# Patient Record
Sex: FEMALE | Race: WHITE | NOT HISPANIC OR LATINO | Employment: FULL TIME | URBAN - METROPOLITAN AREA
[De-identification: names, ages, dates, MRNs, and addresses within clinical notes are randomized per-mention and may not be internally consistent; named-entity substitution may affect disease eponyms.]

---

## 2017-03-04 ENCOUNTER — HOSPITAL ENCOUNTER (OUTPATIENT)
Dept: RADIOLOGY | Facility: MEDICAL CENTER | Age: 27
End: 2017-03-04
Attending: FAMILY MEDICINE
Payer: COMMERCIAL

## 2017-03-04 DIAGNOSIS — R22.2 LOCALIZED SWELLING, MASS AND LUMP, TRUNK: ICD-10-CM

## 2017-03-04 PROCEDURE — 76705 ECHO EXAM OF ABDOMEN: CPT

## 2017-06-01 PROBLEM — D23.5 OTHER BENIGN NEOPLASM OF SKIN OF TRUNK: Status: ACTIVE | Noted: 2017-06-01

## 2017-06-01 PROBLEM — D22.5 MELANOCYTIC NEVI OF TRUNK: Status: ACTIVE | Noted: 2017-06-01

## 2017-06-15 PROBLEM — D49.2 NEOPLASM OF UNSPECIFIED BEHAVIOR OF BONE, SOFT TISSUE, AND SKIN: Status: RESOLVED | Noted: 2017-06-01 | Resolved: 2017-06-15

## 2017-09-18 ENCOUNTER — HOSPITAL ENCOUNTER (OUTPATIENT)
Dept: LAB | Facility: MEDICAL CENTER | Age: 27
End: 2017-09-18
Attending: OBSTETRICS & GYNECOLOGY
Payer: COMMERCIAL

## 2017-09-18 PROCEDURE — 87591 N.GONORRHOEAE DNA AMP PROB: CPT

## 2017-09-18 PROCEDURE — 87491 CHLMYD TRACH DNA AMP PROBE: CPT

## 2017-09-18 PROCEDURE — 88175 CYTOPATH C/V AUTO FLUID REDO: CPT

## 2017-09-19 LAB
C TRACH DNA GENITAL QL NAA+PROBE: NEGATIVE
CYTOLOGY REG CYTOL: NORMAL
N GONORRHOEA DNA GENITAL QL NAA+PROBE: NEGATIVE
SPECIMEN SOURCE: NORMAL

## 2017-10-13 ENCOUNTER — HOSPITAL ENCOUNTER (OUTPATIENT)
Dept: LAB | Facility: MEDICAL CENTER | Age: 27
End: 2017-10-13
Attending: OBSTETRICS & GYNECOLOGY
Payer: COMMERCIAL

## 2017-10-13 LAB
ABO GROUP BLD: NORMAL
BASOPHILS # BLD AUTO: 0.6 % (ref 0–1.8)
BASOPHILS # BLD: 0.06 K/UL (ref 0–0.12)
BLD GP AB SCN SERPL QL: NORMAL
EOSINOPHIL # BLD AUTO: 0.28 K/UL (ref 0–0.51)
EOSINOPHIL NFR BLD: 2.8 % (ref 0–6.9)
ERYTHROCYTE [DISTWIDTH] IN BLOOD BY AUTOMATED COUNT: 54.3 FL (ref 35.9–50)
HBV SURFACE AG SER QL: NEGATIVE
HCT VFR BLD AUTO: 39 % (ref 37–47)
HCV AB SER QL: NEGATIVE
HGB BLD-MCNC: 13.2 G/DL (ref 12–16)
HIV 1+2 AB+HIV1 P24 AG SERPL QL IA: NON REACTIVE
IMM GRANULOCYTES # BLD AUTO: 0.07 K/UL (ref 0–0.11)
IMM GRANULOCYTES NFR BLD AUTO: 0.7 % (ref 0–0.9)
LYMPHOCYTES # BLD AUTO: 2.48 K/UL (ref 1–4.8)
LYMPHOCYTES NFR BLD: 24.7 % (ref 22–41)
MCH RBC QN AUTO: 29.6 PG (ref 27–33)
MCHC RBC AUTO-ENTMCNC: 33.8 G/DL (ref 33.6–35)
MCV RBC AUTO: 87.4 FL (ref 81.4–97.8)
MONOCYTES # BLD AUTO: 0.62 K/UL (ref 0–0.85)
MONOCYTES NFR BLD AUTO: 6.2 % (ref 0–13.4)
NEUTROPHILS # BLD AUTO: 6.55 K/UL (ref 2–7.15)
NEUTROPHILS NFR BLD: 65 % (ref 44–72)
NRBC # BLD AUTO: 0 K/UL
NRBC BLD AUTO-RTO: 0 /100 WBC
PLATELET # BLD AUTO: 249 K/UL (ref 164–446)
PMV BLD AUTO: 9.9 FL (ref 9–12.9)
RBC # BLD AUTO: 4.46 M/UL (ref 4.2–5.4)
RH BLD: NORMAL
RUBV AB SER QL: 182.5 IU/ML
TREPONEMA PALLIDUM IGG+IGM AB [PRESENCE] IN SERUM OR PLASMA BY IMMUNOASSAY: NON REACTIVE
WBC # BLD AUTO: 10.1 K/UL (ref 4.8–10.8)

## 2017-10-13 PROCEDURE — 85025 COMPLETE CBC W/AUTO DIFF WBC: CPT

## 2017-10-13 PROCEDURE — 86803 HEPATITIS C AB TEST: CPT

## 2017-10-13 PROCEDURE — 86850 RBC ANTIBODY SCREEN: CPT

## 2017-10-13 PROCEDURE — 87389 HIV-1 AG W/HIV-1&-2 AB AG IA: CPT

## 2017-10-13 PROCEDURE — 86780 TREPONEMA PALLIDUM: CPT

## 2017-10-13 PROCEDURE — 86901 BLOOD TYPING SEROLOGIC RH(D): CPT

## 2017-10-13 PROCEDURE — 86900 BLOOD TYPING SEROLOGIC ABO: CPT

## 2017-10-13 PROCEDURE — 87340 HEPATITIS B SURFACE AG IA: CPT

## 2017-10-13 PROCEDURE — 87086 URINE CULTURE/COLONY COUNT: CPT

## 2017-10-13 PROCEDURE — 86762 RUBELLA ANTIBODY: CPT

## 2017-10-13 PROCEDURE — 36415 COLL VENOUS BLD VENIPUNCTURE: CPT

## 2017-10-15 LAB
BACTERIA UR CULT: NORMAL
SIGNIFICANT IND 70042: NORMAL
SOURCE SOURCE: NORMAL

## 2017-12-27 ENCOUNTER — HOSPITAL ENCOUNTER (OUTPATIENT)
Dept: LAB | Facility: MEDICAL CENTER | Age: 27
End: 2017-12-27
Attending: OBSTETRICS & GYNECOLOGY
Payer: COMMERCIAL

## 2017-12-27 LAB
GLUCOSE 1H P 50 G GLC PO SERPL-MCNC: 99 MG/DL (ref 70–139)
HCT VFR BLD AUTO: 36.4 % (ref 37–47)
HGB BLD-MCNC: 12.3 G/DL (ref 12–16)
PLATELET # BLD AUTO: 201 K/UL (ref 164–446)
TREPONEMA PALLIDUM IGG+IGM AB [PRESENCE] IN SERUM OR PLASMA BY IMMUNOASSAY: NON REACTIVE

## 2017-12-27 PROCEDURE — 82950 GLUCOSE TEST: CPT

## 2017-12-27 PROCEDURE — 85018 HEMOGLOBIN: CPT

## 2017-12-27 PROCEDURE — 85014 HEMATOCRIT: CPT

## 2017-12-27 PROCEDURE — 36415 COLL VENOUS BLD VENIPUNCTURE: CPT

## 2017-12-27 PROCEDURE — 86780 TREPONEMA PALLIDUM: CPT

## 2017-12-27 PROCEDURE — 85049 AUTOMATED PLATELET COUNT: CPT

## 2018-02-28 ENCOUNTER — HOSPITAL ENCOUNTER (OUTPATIENT)
Dept: LAB | Facility: MEDICAL CENTER | Age: 28
End: 2018-02-28
Attending: OBSTETRICS & GYNECOLOGY
Payer: COMMERCIAL

## 2018-02-28 PROCEDURE — 87653 STREP B DNA AMP PROBE: CPT

## 2018-03-02 LAB — GP B STREP DNA SPEC QL NAA+PROBE: NEGATIVE

## 2018-03-14 ENCOUNTER — HOSPITAL ENCOUNTER (OUTPATIENT)
Facility: MEDICAL CENTER | Age: 28
End: 2018-03-14
Attending: OBSTETRICS & GYNECOLOGY | Admitting: OBSTETRICS & GYNECOLOGY
Payer: COMMERCIAL

## 2018-03-14 VITALS — TEMPERATURE: 97.9 F | SYSTOLIC BLOOD PRESSURE: 122 MMHG | DIASTOLIC BLOOD PRESSURE: 65 MMHG

## 2018-03-14 PROCEDURE — 59025 FETAL NON-STRESS TEST: CPT | Performed by: OBSTETRICS & GYNECOLOGY

## 2018-03-15 NOTE — PROGRESS NOTES
" EDC  36w4d. Pt presents to L&D with complaints of decreased FM. PT taken to LDA 2 for assessment.      TOCO and EFM applied, VSS. Pt states that throughout the day today she has felt \"fluttering\" but has not been able to get her 10 kick counts in 2 hours. Pt denies LOF or VB. Will update Dr. Green on pt status.     Dr. Green updated on pt status, orders to SVE and discharge home.      RN at bedside, SVE -/juvenal. Pt educated on anterior placenta and FM, pt encouraged to seek care if she is worried about FM. Labor precautions given and all questions answered.      PT discharged home in stable condition.     "

## 2018-03-28 ENCOUNTER — HOSPITAL ENCOUNTER (INPATIENT)
Facility: MEDICAL CENTER | Age: 28
LOS: 4 days | End: 2018-04-01
Attending: OBSTETRICS & GYNECOLOGY | Admitting: OBSTETRICS & GYNECOLOGY
Payer: COMMERCIAL

## 2018-03-28 DIAGNOSIS — G89.18 POST-OPERATIVE PAIN: ICD-10-CM

## 2018-03-28 LAB
BASOPHILS # BLD AUTO: 0.7 % (ref 0–1.8)
BASOPHILS # BLD: 0.07 K/UL (ref 0–0.12)
EOSINOPHIL # BLD AUTO: 0.25 K/UL (ref 0–0.51)
EOSINOPHIL NFR BLD: 2.5 % (ref 0–6.9)
ERYTHROCYTE [DISTWIDTH] IN BLOOD BY AUTOMATED COUNT: 43.6 FL (ref 35.9–50)
HCT VFR BLD AUTO: 41.4 % (ref 37–47)
HGB BLD-MCNC: 14.8 G/DL (ref 12–16)
HOLDING TUBE BB 8507: NORMAL
IMM GRANULOCYTES # BLD AUTO: 0.08 K/UL (ref 0–0.11)
IMM GRANULOCYTES NFR BLD AUTO: 0.8 % (ref 0–0.9)
LYMPHOCYTES # BLD AUTO: 2.38 K/UL (ref 1–4.8)
LYMPHOCYTES NFR BLD: 23.8 % (ref 22–41)
MCH RBC QN AUTO: 34.3 PG (ref 27–33)
MCHC RBC AUTO-ENTMCNC: 35.7 G/DL (ref 33.6–35)
MCV RBC AUTO: 96.1 FL (ref 81.4–97.8)
MONOCYTES # BLD AUTO: 0.55 K/UL (ref 0–0.85)
MONOCYTES NFR BLD AUTO: 5.5 % (ref 0–13.4)
NEUTROPHILS # BLD AUTO: 6.69 K/UL (ref 2–7.15)
NEUTROPHILS NFR BLD: 66.7 % (ref 44–72)
NRBC # BLD AUTO: 0 K/UL
NRBC BLD-RTO: 0 /100 WBC
PLATELET # BLD AUTO: 159 K/UL (ref 164–446)
PMV BLD AUTO: 10.3 FL (ref 9–12.9)
RBC # BLD AUTO: 4.31 M/UL (ref 4.2–5.4)
WBC # BLD AUTO: 10 K/UL (ref 4.8–10.8)

## 2018-03-28 PROCEDURE — 700101 HCHG RX REV CODE 250

## 2018-03-28 PROCEDURE — 700111 HCHG RX REV CODE 636 W/ 250 OVERRIDE (IP)

## 2018-03-28 PROCEDURE — 36415 COLL VENOUS BLD VENIPUNCTURE: CPT

## 2018-03-28 PROCEDURE — 700111 HCHG RX REV CODE 636 W/ 250 OVERRIDE (IP): Performed by: ANESTHESIOLOGY

## 2018-03-28 PROCEDURE — 700105 HCHG RX REV CODE 258: Performed by: OBSTETRICS & GYNECOLOGY

## 2018-03-28 PROCEDURE — 305181 HCHG SEPRA FILM

## 2018-03-28 PROCEDURE — 59514 CESAREAN DELIVERY ONLY: CPT

## 2018-03-28 PROCEDURE — 700105 HCHG RX REV CODE 258: Performed by: ANESTHESIOLOGY

## 2018-03-28 PROCEDURE — 770002 HCHG ROOM/CARE - OB PRIVATE (112)

## 2018-03-28 PROCEDURE — 306288 HCHG RETRACTOR C SECTION LG

## 2018-03-28 PROCEDURE — 304964 HCHG RECOVERY ROOM TIME 1HR: Performed by: OBSTETRICS & GYNECOLOGY

## 2018-03-28 PROCEDURE — 85025 COMPLETE CBC W/AUTO DIFF WBC: CPT

## 2018-03-28 PROCEDURE — 700102 HCHG RX REV CODE 250 W/ 637 OVERRIDE(OP): Performed by: ANESTHESIOLOGY

## 2018-03-28 PROCEDURE — 305385 HCHG SURGICAL SERVICES 1/4 HOUR: Performed by: OBSTETRICS & GYNECOLOGY

## 2018-03-28 PROCEDURE — 306828 HCHG ANES-TIME GENERAL: Performed by: OBSTETRICS & GYNECOLOGY

## 2018-03-28 PROCEDURE — 304966 HCHG RECOVERY SVSC TIME ADDL 1/2 HR: Performed by: OBSTETRICS & GYNECOLOGY

## 2018-03-28 RX ORDER — METOCLOPRAMIDE HYDROCHLORIDE 5 MG/ML
10 INJECTION INTRAMUSCULAR; INTRAVENOUS ONCE
Status: COMPLETED | OUTPATIENT
Start: 2018-03-28 | End: 2018-03-28

## 2018-03-28 RX ORDER — CLINDAMYCIN PHOSPHATE 900 MG/50ML
900 INJECTION, SOLUTION INTRAVENOUS ONCE
Status: DISCONTINUED | OUTPATIENT
Start: 2018-03-28 | End: 2018-03-28 | Stop reason: HOSPADM

## 2018-03-28 RX ORDER — ASPIRIN 81 MG/1
81 TABLET, CHEWABLE ORAL DAILY
COMMUNITY
End: 2019-06-14

## 2018-03-28 RX ORDER — MISOPROSTOL 200 UG/1
800 TABLET ORAL
Status: DISCONTINUED | OUTPATIENT
Start: 2018-03-28 | End: 2018-03-28 | Stop reason: HOSPADM

## 2018-03-28 RX ORDER — SODIUM CHLORIDE, SODIUM LACTATE, POTASSIUM CHLORIDE, CALCIUM CHLORIDE 600; 310; 30; 20 MG/100ML; MG/100ML; MG/100ML; MG/100ML
INJECTION, SOLUTION INTRAVENOUS CONTINUOUS
Status: DISCONTINUED | OUTPATIENT
Start: 2018-03-28 | End: 2018-03-28 | Stop reason: HOSPADM

## 2018-03-28 RX ORDER — METHYLERGONOVINE MALEATE 0.2 MG/ML
0.2 INJECTION INTRAVENOUS
Status: DISCONTINUED | OUTPATIENT
Start: 2018-03-28 | End: 2018-03-28 | Stop reason: HOSPADM

## 2018-03-28 RX ORDER — SODIUM CHLORIDE, SODIUM LACTATE, POTASSIUM CHLORIDE, CALCIUM CHLORIDE 600; 310; 30; 20 MG/100ML; MG/100ML; MG/100ML; MG/100ML
1500 INJECTION, SOLUTION INTRAVENOUS ONCE
Status: COMPLETED | OUTPATIENT
Start: 2018-03-28 | End: 2018-03-28

## 2018-03-28 RX ORDER — CITRIC ACID/SODIUM CITRATE 334-500MG
30 SOLUTION, ORAL ORAL ONCE
Status: COMPLETED | OUTPATIENT
Start: 2018-03-28 | End: 2018-03-28

## 2018-03-28 RX ORDER — CARBOPROST TROMETHAMINE 250 UG/ML
250 INJECTION, SOLUTION INTRAMUSCULAR
Status: DISCONTINUED | OUTPATIENT
Start: 2018-03-28 | End: 2018-03-28 | Stop reason: HOSPADM

## 2018-03-28 RX ADMIN — SODIUM CHLORIDE, POTASSIUM CHLORIDE, SODIUM LACTATE AND CALCIUM CHLORIDE 1000 ML: 600; 310; 30; 20 INJECTION, SOLUTION INTRAVENOUS at 20:00

## 2018-03-28 RX ADMIN — Medication 20 UNITS: at 22:00

## 2018-03-28 RX ADMIN — SODIUM CHLORIDE, POTASSIUM CHLORIDE, SODIUM LACTATE AND CALCIUM CHLORIDE: 600; 310; 30; 20 INJECTION, SOLUTION INTRAVENOUS at 20:30

## 2018-03-28 RX ADMIN — SODIUM CITRATE AND CITRIC ACID MONOHYDRATE 30 ML: 500; 334 SOLUTION ORAL at 20:30

## 2018-03-28 RX ADMIN — METOCLOPRAMIDE 10 MG: 5 INJECTION, SOLUTION INTRAMUSCULAR; INTRAVENOUS at 20:30

## 2018-03-28 ASSESSMENT — LIFESTYLE VARIABLES
ALCOHOL_USE: NO
EVER_SMOKED: NEVER

## 2018-03-28 ASSESSMENT — PAIN SCALES - GENERAL: PAINLEVEL_OUTOF10: 2

## 2018-03-29 LAB
ERYTHROCYTE [DISTWIDTH] IN BLOOD BY AUTOMATED COUNT: 42.9 FL (ref 35.9–50)
HCT VFR BLD AUTO: 36.5 % (ref 37–47)
HGB BLD-MCNC: 12.9 G/DL (ref 12–16)
MCH RBC QN AUTO: 33.9 PG (ref 27–33)
MCHC RBC AUTO-ENTMCNC: 35.3 G/DL (ref 33.6–35)
MCV RBC AUTO: 95.8 FL (ref 81.4–97.8)
PLATELET # BLD AUTO: 169 K/UL (ref 164–446)
PMV BLD AUTO: 10.2 FL (ref 9–12.9)
RBC # BLD AUTO: 3.81 M/UL (ref 4.2–5.4)
WBC # BLD AUTO: 11 K/UL (ref 4.8–10.8)

## 2018-03-29 PROCEDURE — 85027 COMPLETE CBC AUTOMATED: CPT

## 2018-03-29 PROCEDURE — A9270 NON-COVERED ITEM OR SERVICE: HCPCS | Performed by: ANESTHESIOLOGY

## 2018-03-29 PROCEDURE — 36415 COLL VENOUS BLD VENIPUNCTURE: CPT

## 2018-03-29 PROCEDURE — 700102 HCHG RX REV CODE 250 W/ 637 OVERRIDE(OP): Performed by: ANESTHESIOLOGY

## 2018-03-29 PROCEDURE — 770002 HCHG ROOM/CARE - OB PRIVATE (112)

## 2018-03-29 PROCEDURE — A9270 NON-COVERED ITEM OR SERVICE: HCPCS | Performed by: OBSTETRICS & GYNECOLOGY

## 2018-03-29 PROCEDURE — 700102 HCHG RX REV CODE 250 W/ 637 OVERRIDE(OP): Performed by: OBSTETRICS & GYNECOLOGY

## 2018-03-29 RX ORDER — VITAMIN A ACETATE, BETA CAROTENE, ASCORBIC ACID, CHOLECALCIFEROL, .ALPHA.-TOCOPHEROL ACETATE, DL-, THIAMINE MONONITRATE, RIBOFLAVIN, NIACINAMIDE, PYRIDOXINE HYDROCHLORIDE, FOLIC ACID, CYANOCOBALAMIN, CALCIUM CARBONATE, FERROUS FUMARATE, ZINC OXIDE, CUPRIC OXIDE 3080; 12; 120; 400; 1; 1.84; 3; 20; 22; 920; 25; 200; 27; 10; 2 [IU]/1; UG/1; MG/1; [IU]/1; MG/1; MG/1; MG/1; MG/1; MG/1; [IU]/1; MG/1; MG/1; MG/1; MG/1; MG/1
1 TABLET, FILM COATED ORAL EVERY MORNING
Status: DISCONTINUED | OUTPATIENT
Start: 2018-03-29 | End: 2018-04-01 | Stop reason: HOSPADM

## 2018-03-29 RX ORDER — SIMETHICONE 80 MG
80 TABLET,CHEWABLE ORAL 4 TIMES DAILY PRN
Status: DISCONTINUED | OUTPATIENT
Start: 2018-03-29 | End: 2018-04-01 | Stop reason: HOSPADM

## 2018-03-29 RX ORDER — DOCUSATE SODIUM 100 MG/1
100 CAPSULE, LIQUID FILLED ORAL 2 TIMES DAILY PRN
Status: DISCONTINUED | OUTPATIENT
Start: 2018-03-29 | End: 2018-04-01 | Stop reason: HOSPADM

## 2018-03-29 RX ORDER — OXYCODONE AND ACETAMINOPHEN 10; 325 MG/1; MG/1
1 TABLET ORAL EVERY 4 HOURS PRN
Status: DISCONTINUED | OUTPATIENT
Start: 2018-03-29 | End: 2018-03-29

## 2018-03-29 RX ORDER — ONDANSETRON 2 MG/ML
4 INJECTION INTRAMUSCULAR; INTRAVENOUS EVERY 6 HOURS PRN
Status: DISCONTINUED | OUTPATIENT
Start: 2018-03-29 | End: 2018-04-01 | Stop reason: HOSPADM

## 2018-03-29 RX ORDER — ONDANSETRON 4 MG/1
4 TABLET, ORALLY DISINTEGRATING ORAL EVERY 6 HOURS PRN
Status: DISCONTINUED | OUTPATIENT
Start: 2018-03-29 | End: 2018-04-01 | Stop reason: HOSPADM

## 2018-03-29 RX ORDER — IBUPROFEN 600 MG/1
600 TABLET ORAL EVERY 6 HOURS PRN
Status: DISCONTINUED | OUTPATIENT
Start: 2018-03-29 | End: 2018-04-01 | Stop reason: HOSPADM

## 2018-03-29 RX ORDER — ONDANSETRON 2 MG/ML
4 INJECTION INTRAMUSCULAR; INTRAVENOUS EVERY 6 HOURS PRN
Status: DISCONTINUED | OUTPATIENT
Start: 2018-03-29 | End: 2018-03-29

## 2018-03-29 RX ORDER — METHYLERGONOVINE MALEATE 0.2 MG/ML
0.2 INJECTION INTRAVENOUS
Status: DISCONTINUED | OUTPATIENT
Start: 2018-03-29 | End: 2018-04-01 | Stop reason: HOSPADM

## 2018-03-29 RX ORDER — MISOPROSTOL 200 UG/1
800 TABLET ORAL
Status: DISCONTINUED | OUTPATIENT
Start: 2018-03-29 | End: 2018-04-01 | Stop reason: HOSPADM

## 2018-03-29 RX ORDER — OXYCODONE HYDROCHLORIDE AND ACETAMINOPHEN 5; 325 MG/1; MG/1
1 TABLET ORAL EVERY 4 HOURS PRN
Status: DISCONTINUED | OUTPATIENT
Start: 2018-03-29 | End: 2018-03-29

## 2018-03-29 RX ORDER — NALOXONE HYDROCHLORIDE 0.4 MG/ML
0.1 INJECTION, SOLUTION INTRAMUSCULAR; INTRAVENOUS; SUBCUTANEOUS PRN
Status: DISCONTINUED | OUTPATIENT
Start: 2018-03-29 | End: 2018-03-29

## 2018-03-29 RX ORDER — SODIUM CHLORIDE, SODIUM LACTATE, POTASSIUM CHLORIDE, CALCIUM CHLORIDE 600; 310; 30; 20 MG/100ML; MG/100ML; MG/100ML; MG/100ML
INJECTION, SOLUTION INTRAVENOUS PRN
Status: DISCONTINUED | OUTPATIENT
Start: 2018-03-29 | End: 2018-04-01 | Stop reason: HOSPADM

## 2018-03-29 RX ORDER — DIPHENHYDRAMINE HYDROCHLORIDE 50 MG/ML
12.5 INJECTION INTRAMUSCULAR; INTRAVENOUS EVERY 6 HOURS PRN
Status: DISCONTINUED | OUTPATIENT
Start: 2018-03-29 | End: 2018-03-29

## 2018-03-29 RX ORDER — OXYCODONE HYDROCHLORIDE AND ACETAMINOPHEN 5; 325 MG/1; MG/1
1 TABLET ORAL EVERY 4 HOURS PRN
Status: DISCONTINUED | OUTPATIENT
Start: 2018-03-29 | End: 2018-04-01 | Stop reason: HOSPADM

## 2018-03-29 RX ORDER — DIPHENHYDRAMINE HCL 25 MG
25 TABLET ORAL EVERY 6 HOURS PRN
Status: DISCONTINUED | OUTPATIENT
Start: 2018-03-29 | End: 2018-04-01 | Stop reason: HOSPADM

## 2018-03-29 RX ORDER — CARBOPROST TROMETHAMINE 250 UG/ML
250 INJECTION, SOLUTION INTRAMUSCULAR
Status: DISCONTINUED | OUTPATIENT
Start: 2018-03-29 | End: 2018-04-01 | Stop reason: HOSPADM

## 2018-03-29 RX ORDER — MORPHINE SULFATE 4 MG/ML
4 INJECTION, SOLUTION INTRAMUSCULAR; INTRAVENOUS
Status: DISCONTINUED | OUTPATIENT
Start: 2018-03-29 | End: 2018-04-01 | Stop reason: HOSPADM

## 2018-03-29 RX ORDER — OXYCODONE AND ACETAMINOPHEN 10; 325 MG/1; MG/1
1 TABLET ORAL EVERY 4 HOURS PRN
Status: DISCONTINUED | OUTPATIENT
Start: 2018-03-29 | End: 2018-04-01 | Stop reason: HOSPADM

## 2018-03-29 RX ORDER — DIPHENHYDRAMINE HYDROCHLORIDE 50 MG/ML
25 INJECTION INTRAMUSCULAR; INTRAVENOUS EVERY 6 HOURS PRN
Status: DISCONTINUED | OUTPATIENT
Start: 2018-03-29 | End: 2018-04-01 | Stop reason: HOSPADM

## 2018-03-29 RX ADMIN — IBUPROFEN 600 MG: 600 TABLET, FILM COATED ORAL at 12:44

## 2018-03-29 RX ADMIN — OXYCODONE HYDROCHLORIDE AND ACETAMINOPHEN 1 TABLET: 5; 325 TABLET ORAL at 01:15

## 2018-03-29 RX ADMIN — Medication 1 TABLET: at 09:49

## 2018-03-29 RX ADMIN — IBUPROFEN 600 MG: 600 TABLET, FILM COATED ORAL at 20:43

## 2018-03-29 RX ADMIN — OXYCODONE HYDROCHLORIDE AND ACETAMINOPHEN 1 TABLET: 5; 325 TABLET ORAL at 12:44

## 2018-03-29 RX ADMIN — OXYCODONE HYDROCHLORIDE AND ACETAMINOPHEN 1 TABLET: 5; 325 TABLET ORAL at 18:14

## 2018-03-29 RX ADMIN — OXYCODONE HYDROCHLORIDE AND ACETAMINOPHEN 1 TABLET: 5; 325 TABLET ORAL at 22:14

## 2018-03-29 RX ADMIN — OXYCODONE HYDROCHLORIDE AND ACETAMINOPHEN 1 TABLET: 5; 325 TABLET ORAL at 05:28

## 2018-03-29 ASSESSMENT — PAIN SCALES - GENERAL
PAINLEVEL_OUTOF10: 4
PAINLEVEL_OUTOF10: 3
PAINLEVEL_OUTOF10: 5
PAINLEVEL_OUTOF10: 2
PAINLEVEL_OUTOF10: 5

## 2018-03-29 NOTE — OP REPORT
DATE OF SERVICE:  2018    PREOPERATIVE DIAGNOSES:  1.  Intrauterine pregnancy at 38-4/7th weeks.  2.  Labor.  3.  Prior  section.    POSTOPERATIVE DIAGNOSES:  1.  Intrauterine pregnancy at 38-4/7th weeks.  2.  Labor.  3.  Prior  section.    PROCEDURE:  Repeat lower uterine segment transverse  section.    SURGEON:  Bernie Barriga MD    ASSISTANT:  Fausto Cartwright MD.    ANESTHESIA:  Spinal.    ANESTHESIOLOGIST:  Ivan Pearce MD    ESTIMATED BLOOD LOSS:  600 mL.    FLUIDS:  1800 mL crystalloid.    URINE OUTPUT:  100 mL of clear urine.    FINDINGS:  Live born female infant, weight 6 pounds 8 ounces, Apgars 8 and 7.  Clear   amniotic fluid.  Normal appearing fallopian tubes and ovaries.  Uterus with   thin lower uterine segment.    DRAINS:  Robison to gravity.    SPECIMENS:  None.    COMPLICATIONS:  None apparent.    DISPOSITION:  Patient to recovery room in stable condition.    TECHNIQUE:  After adequate informed consent was obtained, the patient was   taken to the operating room.  She was placed under spinal anesthetic and laid   in the supine position with the left lateral tilt.  Fetal heart tones were   obtained and found to be in the 150s.  She had a Robison catheter placed to   gravity and sequential stockings placed on her lower extremities.  She   received 900 mg of clindamycin IV preoperatively.  She was then prepped and   draped in the usual sterile fashion.  A time-out was taken to confirm the   proper patient and procedure.  After ensuring adequate anesthesia, her prior   Pfannenstiel skin incision was excised and the incision was carried down   sharply to the fascia.  The fascia was nicked in the midline and dissected   bilaterally using sharp dissection.  The fascia was then  from the   rectus muscle both superiorly and inferiorly using sharp dissection.  The   peritoneum was identified and entered bluntly after ensuring no evidence of   bowel or bladder underneath.  The  peritoneum was then dissected superiorly and   inferiorly using sharp dissection.  An Vinny O retractor was placed without   any bowel impingement.  A bladder flap was created using Metzenbaum scissors,   and it was noted in the lower uterine segment there was a very thin area   almost a window.  The decision was made to make the hysterotomy 2 cm above   this.  This was made with the scalpel and extended bilaterally using blunt   dissection and Allis clamp was used to rupture membranes, which were clear   fluid.  The surgeon's hand was placed in the uterine cavity and the head was   elevated and delivered without complication.  Mouth and nose were bulb   suctioned and there was no evidence of a nuchal cord.  The rest of the baby   delivered easily.  This was a viable female infant, weight 6 pounds 8 ounces, Apgars 8   and 7 that was initially crying vigorously, and then after about 2-1/2   minutes, started desaturating, required CPAP and a rapid response with the   NICU team.  If she had oxygen on CPAP, she was fine.  They attempted to place   an IV and provide a fluid bolus.  The placenta was removed with manual   extraction.  The patient then received 20 units of IV Pitocin and 1000 mL of   lactated Ringers IV.  The uterine incision was identified and closed using a   single suture of 0 Vicryl in a running locked fashion and imbricating layer of   0 chromic was also placed.  Good hemostasis was noted.  The Vinny O   retractor was removed.  The peritoneum was identified.  Prior to this, a   Seprafilm was placed across the lower uterine segment and the anterior surface   of the uterus.  At this time, the peritoneum was reapproximated using 2-0   Vicryl in a running fashion.  The rectus muscles were reapproximated in the   midline using a figure-of-eight suture.  The wound was irrigated copiously.    No bleeding was noted beneath the fascia.  The fascia was reapproximated using   2 separate sutures of 0 Vicryl in a  running fashion overlapping in the   midline.  The subcutaneous tissue was irrigated.  No bleeding was noted.  This   was reapproximated using 3-0 Vicryl in a running fashion.  The skin was   reapproximated using 4-0 Vicryl in a running fashion.  Steri-Strips were   placed and a dressing was applied.  Sponge and instrument counts were correct   x3.  The patient tolerated the procedure well, and there were no apparent   complications.       ____________________________________     MD MARIA FERNANDA SOLANO / MARIA EUGENIA    DD:  03/28/2018 21:55:35  DT:  03/29/2018 01:56:06    D#:  [id number]  Job#:  [id number]

## 2018-03-29 NOTE — PROGRESS NOTES
Pt is a ; MANOJ of ; 38.4. Pt c/o irreg UCs. PT reports +FM, -VB, and -LOF. Pt has a hx of c/s xs 1 for Breech presentation. Plan is for a R c/s; scheduled for Monday. EFM and TOCO applied. Reactive NST obtained. UA collected. SVE at 3/80/-2.     Dr Barriga notified. Orders received to prep pt for r c/s.     Report given to MERLENE Wheeler RN.

## 2018-03-29 NOTE — PROGRESS NOTES
2320 Assumed care from labor and delivery. Oriented patient to room, call light, emergency light, TV, bed remote. POC discussed, verbalized understanding. Patient declines pain med intervention at this time, will call if pain meds needed. Assessment completed, fundus firm, lochia light. ABD incision with dressing in place, intact.

## 2018-03-29 NOTE — PROGRESS NOTES
POD #1    S:  Pt visiting baby in ICN (TTN).  Pumping. No flatus.  Jude clears.  Robison in place.  No N/V.  Pain controlled.  Minimal lochia    O: T: 98.6 P 78  /68  ABD:  Soft, ND, minimally tender, BS hypoactive, dressing dry  EXT:  No cords or HOmans  H/H pending - was 14/41 on admission  A+  GBS neg    A/P:  POD #1 S/P repeat C/S at 38 4/7 weeks, labor - doing well  1.  Continue routine PP/post op care  2. F/U on H/H  3.  Pt to remove dressing today

## 2018-03-29 NOTE — CONSULTS
Lactation Note:    Initial Consult:    Met with RENAE in NICU; RENAE is a G2,P2 with a previous breastfeeding history of nursing first child for 1 yr.  MOB expressed concern that she was producing only drops of colostrum when pumping.  Reassured her that this was normal and explained the significant health benefits to infant at receiving even a few drops of colostrum.  Encouraged MOB to collect all colostrum expressed and bring to NICU to feed back to infant when possible.    RENAE denies pain, rubbing, and blanching of nipples with breast pump use.    RENAE states has [employer] and is aware that her insurance will cover the cost of either a hospital grade breast pump or personal breast pump.  She is still unsure as of now which pump to go with.  RENAE states will make final decision shortly and will  her choice of breast pump tomorrow.  Paperwork provided for breast pump.    RENAE verbalized understanding of all information provided to her and denies having any further questions at this time.  Encouraged to call for assistance as needed.

## 2018-03-29 NOTE — H&P
DATE OF ADMISSION: 2018    ADMITTING DIAGNOSES:  1.  Intrauterine pregnancy at 38-4/7th weeks.  2.  Prior  section.  3.  Labor.    HISTORY OF PRESENT ILLNESS:  This is a 27-year-old  2, para 1 with an   estimated date of confinement of 2018 making her 38 and 4/7th weeks, who   presents to labor and delivery with complaints of contractions since 1430   hours, and they have increased in intensity, and she was evaluated in labor   and delivery and found to be 3 cm dilated, 50% effaced, -2 station, vertex   presentation.  The recommendation was made to proceed with the repeat    section and the patient agrees.  The pros and cons, risks and benefits of the   procedure were reviewed and include but are not limited to bleeding,   infection, damage to bowel, bladder, nerves, the baby, or other organs, need   for life saving blood transfusion, hysterectomy, complications with future   pregnancies, complications with anesthesia, and death.  All of her questions   were answered and a consent form has been signed.    Prenatal care has been with Dr. Barriga.  Her estimated date of confinement of   2018 was established by an 11-week ultrasound and last menstrual period.    PRENATAL LABS:  Her blood type is A positive, antibody screen negative, RPR   nonreactive, rubella immune, hepatitis B surface antigen negative, hepatitis C   negative, HIV negative.  Her 1-hour Glucola was normal.  Her group B strep   status is negative.    ALLERGIES:  SHE IS ALLERGIC TO CECLOR AND VICODIN, WHICH CAUSES NAUSEA.    MEDICATIONS:  Include prenatal vitamins.    PAST MEDICAL HISTORY:  Significant for asthma and possible stroke, on birth   control pills, although workup for this was negative.    PAST SURGICAL HISTORY:  She has had a  for breech, appendectomy, and   tonsillectomy.    SOCIAL HISTORY:  She denies tobacco, alcohol, or IV drug use.    GYNECOLOGIC HISTORY:  She has no history of any HSV or  abnormal paps.    OBSTETRICAL HISTORY:   1 with a  section for breach.   2   is her current pregnancy.    PHYSICAL EXAMINATION:  VITAL SIGNS:  Temperature is 99.1, pulse is 97, blood pressure is 131/91.  GENERAL:  She is pleasant and in moderate distress with contractions.  ABDOMEN:  Gravid and nontender.  EXTREMITIES:  Show +1 pedal edema.  She has no cords or Homans sign.  :  Cervical exam; she is 3 cm dilated, 50% effaced, -2 station, vertex   presentation.  Fetal heart tones are in the 150s in the category 1 tracing.    Tocometry shows contractions every 3 minutes.  Estimated fetal weight is 3300   g.    IMPRESSION:  This is a 27-year-old  2, para 1 at 38-4/7th weeks in   labor with a prior  section.    PLAN:  1.  The patient will be admitted to labor and delivery.  2.  IV fluids will be started, and she will be prepped for repeat    section.  3.  There is currently reassuring fetal surveillance.       ____________________________________     MD MARIA FERNANDA SOLANO / MARIA EUGENIA    DD:  2018 19:35:28  DT:  2018 22:09:31    D#:  [id number]  Job#:  [id number]

## 2018-03-29 NOTE — PROGRESS NOTES
2320 Assumed care from labor and delivery. Oriented patient to room, call light, emergency light, TV, bed remote. POC discussed, verbalized understanding. Patient declines pain med intervention at this time, will call if pain meds needed. Assessment completed, fundus firm, lochia light. ABD incision with dressing in place, intact.   0500 Patient ambulated to restroom with minimal assist. Tolerated well. Amy care completed. This RN assisted patient to NICU on wheelchair to visit infant.  0630 Patient in NICU.   0710 Patient in NICU

## 2018-03-29 NOTE — CARE PLAN
Problem: Altered physiologic condition related to postoperative  delivery  Goal: Patient physiologically stable as evidenced by normal lochia, palpable uterine involution and vital signs within normal limits  Outcome: PROGRESSING AS EXPECTED  Fundus firm, lochia light     Problem: Alteration in comfort related to surgical incision and/or after birth pains  Goal: Patient verbalizes acceptable pain level  Outcome: PROGRESSING AS EXPECTED  Patient indicates acceptable pain at this time

## 2018-03-30 PROCEDURE — 770002 HCHG ROOM/CARE - OB PRIVATE (112)

## 2018-03-30 PROCEDURE — 700102 HCHG RX REV CODE 250 W/ 637 OVERRIDE(OP): Performed by: OBSTETRICS & GYNECOLOGY

## 2018-03-30 PROCEDURE — A9270 NON-COVERED ITEM OR SERVICE: HCPCS | Performed by: OBSTETRICS & GYNECOLOGY

## 2018-03-30 PROCEDURE — 700112 HCHG RX REV CODE 229: Performed by: OBSTETRICS & GYNECOLOGY

## 2018-03-30 RX ADMIN — OXYCODONE HYDROCHLORIDE AND ACETAMINOPHEN 1 TABLET: 5; 325 TABLET ORAL at 18:17

## 2018-03-30 RX ADMIN — OXYCODONE HYDROCHLORIDE AND ACETAMINOPHEN 1 TABLET: 5; 325 TABLET ORAL at 22:16

## 2018-03-30 RX ADMIN — SIMETHICONE 80 MG: 80 TABLET, CHEWABLE ORAL at 03:59

## 2018-03-30 RX ADMIN — IBUPROFEN 600 MG: 600 TABLET, FILM COATED ORAL at 10:01

## 2018-03-30 RX ADMIN — DOCUSATE SODIUM 100 MG: 100 CAPSULE ORAL at 18:17

## 2018-03-30 RX ADMIN — IBUPROFEN 600 MG: 600 TABLET, FILM COATED ORAL at 03:59

## 2018-03-30 RX ADMIN — OXYCODONE HYDROCHLORIDE AND ACETAMINOPHEN 1 TABLET: 5; 325 TABLET ORAL at 11:17

## 2018-03-30 RX ADMIN — SIMETHICONE 80 MG: 80 TABLET, CHEWABLE ORAL at 18:17

## 2018-03-30 RX ADMIN — IBUPROFEN 600 MG: 600 TABLET, FILM COATED ORAL at 18:17

## 2018-03-30 RX ADMIN — DOCUSATE SODIUM 100 MG: 100 CAPSULE ORAL at 03:59

## 2018-03-30 RX ADMIN — OXYCODONE HYDROCHLORIDE AND ACETAMINOPHEN 1 TABLET: 5; 325 TABLET ORAL at 03:59

## 2018-03-30 RX ADMIN — Medication 1 TABLET: at 10:01

## 2018-03-30 ASSESSMENT — PAIN SCALES - GENERAL
PAINLEVEL_OUTOF10: 2
PAINLEVEL_OUTOF10: 4
PAINLEVEL_OUTOF10: 4
PAINLEVEL_OUTOF10: 2
PAINLEVEL_OUTOF10: 6
PAINLEVEL_OUTOF10: 2
PAINLEVEL_OUTOF10: 5
PAINLEVEL_OUTOF10: 5

## 2018-03-30 NOTE — PROGRESS NOTES
Lactation Note:    MOB states has increased comfort with flange size of    28.5 mm.  Encouraged to call for assistance as needed.

## 2018-03-30 NOTE — CARE PLAN
Problem: Alteration in comfort related to surgical incision and/or after birth pains  Goal: Patient is able to ambulate, care for self and infant with acceptable pain level  Outcome: PROGRESSING AS EXPECTED  Patient ambulating, taking care of self. Will call if pain med intervention needed.

## 2018-03-30 NOTE — CARE PLAN
Problem: Altered physiologic condition related to postoperative  delivery  Goal: Patient physiologically stable as evidenced by normal lochia, palpable uterine involution and vital signs within normal limits  Outcome: PROGRESSING AS EXPECTED  Fundus firm, lochia light, vital signs stable.   Will continue to monitor with Q6 hr check     Problem: Alteration in comfort related to surgical incision and/or after birth pains  Goal: Patient is able to ambulate, care for self and infant with acceptable pain level  Outcome: PROGRESSING AS EXPECTED  Pt able to ambulate, care for self with infant in NICU.Pt medicated with PRN medication per MAR order. Will continue to monitor with Q6 hr checks.

## 2018-03-30 NOTE — PROGRESS NOTES
"2030 Assumed care. Patient just returning from NICU. Assessment completed, fundus firm, lochia light. ABD incision with steri strips dry, intact, no signs of infection.   2100 Patient tearful indicating infant status was unexpected, FOB at bedside providing support. All questions answered, POC reviewed.    2215 Patient currently finish pumping and hand expressing. Patient indicated wanting to attempt to rest and \"do not disturb\" sign to be placed on door until next pumping session. Call light within reach.   "

## 2018-03-30 NOTE — PROGRESS NOTES
Pt up in bed, and appears to be calm. Assessment completed 10:01, fundus firm, lochia elda light. ABD incision with steri strips dry, intact, , no signs of infection. Skin color and temp WDL.   Pain 4/10. Reports LBM 3/28 before giving birth. Pt is UTB and reports no problem with urination.   Pt reports that breastfeeding with pump doesn't express a lot, but that she is able to hand express.   Nurse talked with pt about baby's condition in NICU. MOB and FOB express that they will be in NICU.

## 2018-03-30 NOTE — DISCHARGE PLANNING
Medical Social Work     Infant: Zohra Hargrove     SW met with MOB at bedside to complete assessment. Confirmed information listed on facesheet is correct.      Plan:  SW to follow and assist as needed.     Discharge Planning Assessment Post Partum     Reason for Referral: NICU- 38 weeks 4d  Address: [address] [place] [address]  Type of Living Situation: Lives with FOB   Mom Diagnosis: Pregnancy  Baby Diagnosis: Respiratory distress of       Primary Language: English     Father of the Baby: Roland Hargrove  Involved in baby’s care? Yes  Contact Information: [phone]  FOB'S : 1990     Prenatal Care: Yes, [facility]  Mom's PCP: Janice Edmondson MD  PCP for new baby: Paris Vásquez MD     Support System: FOB and family  Source of Feeding: Breastfeed  Supplies for Infant: Has all needed supplies     Mom's Insurance: [employer]  Baby Covered on Insurance: Yes  Mother Employed/School: Yes, [employer]  Other children in the home/names & ages: Alex (2)     Financial Hardship/Income: FOB employed  Mom's Mental status: A&Ox4  Services used prior to admit: None  Psychiatric History: MOB reports hx of baby blues that lasted for about 2 weeks.      Resources Provided: None  Referrals Made: None

## 2018-03-30 NOTE — CARE PLAN
Problem: Potential for postpartum infection related to surgical incision, compromised uterine condition, urinary tract or respiratory compromise  Goal: Patient will be afebrile and free from signs and symptoms of infection  Outcome: PROGRESSING AS EXPECTED  VSS. No signs or symptoms of infection noted or reported.

## 2018-03-30 NOTE — PROGRESS NOTES
Progress Note    Concetta Hargrove   Post-op day 2  Chief Admitting Dx: Pregnancy  Laboring  Indication for care in labor or delivery  Delivery Type:  for repeat      Subjective:  Pt is pumping breast milk. Baby in in ICN but stable.   Ambulating: yes  Tolerating oral feed: yes  Flatus: yes    Voiding: yes  Dizziness: no  Vitals:    18 1100 18 1200 18 1300 18   BP:   114/87 112/82   Pulse: 72 75 95 95   Resp:    Temp:   36.9 °C (98.4 °F) 37 °C (98.6 °F)   TempSrc:       SpO2: 99% 99% 97% 95%   Weight:       Height:           Exam:  Gen: NAD  Abdomen: Abdomen soft, non-tender. BS normal. No masses,  No organomegaly  Incision: dry and intact  Fundus Tenderness: no  Below umbilicus: yes  Perineum: perineum intact  Extremities: 1+ edema  Lochia: mild    Labs:   Recent Results (from the past 24 hour(s))   CBC without differential    Collection Time: 18  8:26 PM   Result Value Ref Range    WBC 11.0 (H) 4.8 - 10.8 K/uL    RBC 3.81 (L) 4.20 - 5.40 M/uL    Hemoglobin 12.9 12.0 - 16.0 g/dL    Hematocrit 36.5 (L) 37.0 - 47.0 %    MCV 95.8 81.4 - 97.8 fL    MCH 33.9 (H) 27.0 - 33.0 pg    MCHC 35.3 (H) 33.6 - 35.0 g/dL    RDW 42.9 35.9 - 50.0 fL    Platelet Count 169 164 - 446 K/uL    MPV 10.2 9.0 - 12.9 fL       Assessment:  Continue Routine postpartum care      Plan:  Consider Discharge 24h-48 hours    Britney Rawls M.D.

## 2018-03-30 NOTE — PROGRESS NOTES
Lactation Note:    MOB continues to pump as previously instructed and is now collecting 3 ml of colostrum from both breasts combined.  States has pain at breasts with pumping.  Breast assessment performed.  Breasts are filling and nipple and areola have some redness.  Flange fit also assessed at this time and width size of flange appears small for MOB's nipple.  MOB given larger flange size of 28.5 mm to use with next pumping session.  Pump settings also reviewed and are: 60-80 CPM/suction 20%/x15 minutes.  MOB encouraged to decrease suction to 15% to see if this will decrease pain at nipples and areolas when pumping.  Nipple care also discussed and MOB given instruction and education on how and why to apply EBM and Lanolin cream to nipples/breasts.    MOB also states is doing hand expression to expel colostrum from breasts.  Observed MOB perform hand expression and she was noted to be using firm pressure with each movement which could be causing additional discomfort at breasts.  Demonstrated to MOB on correct pressure to apply to breast and colostrum was easily expressed from breast.  MOB able to perform return demonstration.    RENAE states will  hospital grade breast pump from the [facility] today.  She continues to be aware of the outpatient lactation assistance available to her through the [facility].    MOB encouraged to call for lactation assistance when infant is well enough to be put to breast to feed.    MOB verbalized understanding of all information provided to her and denies having any further questions at this time.  Encouraged to call for assistance as needed.

## 2018-03-31 PROCEDURE — A9270 NON-COVERED ITEM OR SERVICE: HCPCS | Performed by: OBSTETRICS & GYNECOLOGY

## 2018-03-31 PROCEDURE — 770002 HCHG ROOM/CARE - OB PRIVATE (112)

## 2018-03-31 PROCEDURE — 700112 HCHG RX REV CODE 229: Performed by: OBSTETRICS & GYNECOLOGY

## 2018-03-31 PROCEDURE — 700102 HCHG RX REV CODE 250 W/ 637 OVERRIDE(OP): Performed by: OBSTETRICS & GYNECOLOGY

## 2018-03-31 RX ADMIN — OXYCODONE HYDROCHLORIDE AND ACETAMINOPHEN 1 TABLET: 5; 325 TABLET ORAL at 21:21

## 2018-03-31 RX ADMIN — IBUPROFEN 600 MG: 600 TABLET, FILM COATED ORAL at 17:26

## 2018-03-31 RX ADMIN — OXYCODONE HYDROCHLORIDE AND ACETAMINOPHEN 1 TABLET: 5; 325 TABLET ORAL at 10:14

## 2018-03-31 RX ADMIN — DOCUSATE SODIUM 100 MG: 100 CAPSULE ORAL at 08:48

## 2018-03-31 RX ADMIN — Medication 1 TABLET: at 08:48

## 2018-03-31 RX ADMIN — DOCUSATE SODIUM 100 MG: 100 CAPSULE ORAL at 21:21

## 2018-03-31 RX ADMIN — OXYCODONE HYDROCHLORIDE AND ACETAMINOPHEN 1 TABLET: 5; 325 TABLET ORAL at 02:23

## 2018-03-31 RX ADMIN — IBUPROFEN 600 MG: 600 TABLET, FILM COATED ORAL at 08:48

## 2018-03-31 RX ADMIN — OXYCODONE HYDROCHLORIDE AND ACETAMINOPHEN 1 TABLET: 5; 325 TABLET ORAL at 17:26

## 2018-03-31 RX ADMIN — SIMETHICONE 80 MG: 80 TABLET, CHEWABLE ORAL at 21:21

## 2018-03-31 RX ADMIN — IBUPROFEN 600 MG: 600 TABLET, FILM COATED ORAL at 01:32

## 2018-03-31 ASSESSMENT — PAIN SCALES - GENERAL
PAINLEVEL_OUTOF10: 3
PAINLEVEL_OUTOF10: 6
PAINLEVEL_OUTOF10: 6
PAINLEVEL_OUTOF10: 4
PAINLEVEL_OUTOF10: 6
PAINLEVEL_OUTOF10: 5

## 2018-03-31 NOTE — PROGRESS NOTES
Received bedside report from CHAZ Cox. Patient in bed, rating pain 2/10. Recently got medicated. Patient will call when needing pain medication. POC discussed, whiteboards updated. Patient encouraged to pump q 3 hrs. She is pumping with settings 80/20/15. She is getting about 15 mls of breast milk, told to decrease CPM to 60 percent after 2 minutes. Call light within reach. Patient encouraged to call with any needs and or concerns.

## 2018-03-31 NOTE — PROGRESS NOTES
Progress Note    Concetta Hargrove   Post-op day 3  Chief Admitting Dx: Pregnancy  Laboring  Indication for care in labor or delivery  Delivery Type: RTLCS      Subjective:    Doing well.  Pain controlled.  Pumping breast milk.  Baby is still in NICU and patient desires discharge to home tomorrow.    Vitals:    03/29/18 2000 03/30/18 1000 03/30/18 2000 03/31/18 0800   BP: 112/82 108/78 124/87 105/75   Pulse: 95 93 92 81   Resp: 19 18 18 17   Temp: 37 °C (98.6 °F) 36.7 °C (98.1 °F) 36.7 °C (98.1 °F) 36.7 °C (98.1 °F)   TempSrc:       SpO2: 95% 98% 94% 96%   Weight:       Height:           Exam:    A, A, and O x 3 NAD    Incision - clean/dry/intact - steri strips in place    Extremities - no clubbing,cyanosis, or edema    Fundus - firm - one cm below the umbilicus    Abdomen - soft and mildly tender to palpation    Labs:   No results found for this or any previous visit (from the past 24 hour(s)).  Results for CONCETTA HARGROVE (MRN [id number]) as of 3/31/2018 10:33   Ref. Range 3/28/2018 19:45 3/29/2018 20:26   WBC Latest Ref Range: 4.8 - 10.8 K/uL 10.0 11.0 (H)   RBC Latest Ref Range: 4.20 - 5.40 M/uL 4.31 3.81 (L)   Hemoglobin Latest Ref Range: 12.0 - 16.0 g/dL 14.8 12.9   Hematocrit Latest Ref Range: 37.0 - 47.0 % 41.4 36.5 (L)   MCV Latest Ref Range: 81.4 - 97.8 fL 96.1 95.8   MCH Latest Ref Range: 27.0 - 33.0 pg 34.3 (H) 33.9 (H)   MCHC Latest Ref Range: 33.6 - 35.0 g/dL 35.7 (H) 35.3 (H)   RDW Latest Ref Range: 35.9 - 50.0 fL 43.6 42.9   Platelet Count Latest Ref Range: 164 - 446 K/uL 159 (L) 169   MPV Latest Ref Range: 9.0 - 12.9 fL 10.3 10.2     Assessment:  Continue Routine postpartum care  Stable.  Pumping breast milk.    Plan:  D/C to home tomorrow.    Noreen Cheng M.D.

## 2018-03-31 NOTE — CARE PLAN
Problem: Altered physiologic condition related to postoperative  delivery  Goal: Patient physiologically stable as evidenced by normal lochia, palpable uterine involution and vital signs within normal limits  Outcome: PROGRESSING AS EXPECTED  VSS. Fundus firm with light lochia. Patient educated on when to pull emergency call light.     Problem: Potential for postpartum infection related to surgical incision, compromised uterine condition, urinary tract or respiratory compromise  Goal: Patient will be afebrile and free from signs and symptoms of infection  Outcome: PROGRESSING AS EXPECTED  Pt remains afebrile. No signs or symptoms of infection. Incision looks well approximated. No erythema or drainage noted.

## 2018-03-31 NOTE — CARE PLAN
Problem: Altered physiologic condition related to postoperative  delivery  Goal: Patient physiologically stable as evidenced by normal lochia, palpable uterine involution and vital signs within normal limits  Outcome: PROGRESSING AS EXPECTED  Patient has scant to light lochia with a firm palpable uterus.  Vital signs are within defined limits.  Assessment will continue.     Problem: Alteration in comfort related to surgical incision and/or after birth pains  Goal: Patient verbalizes acceptable pain level  Outcome: PROGRESSING AS EXPECTED  Patient will ask for pain medication when needed. Pain assessment will continue.

## 2018-04-01 VITALS
WEIGHT: 163 LBS | OXYGEN SATURATION: 92 % | SYSTOLIC BLOOD PRESSURE: 105 MMHG | TEMPERATURE: 97.8 F | HEIGHT: 62 IN | BODY MASS INDEX: 30 KG/M2 | HEART RATE: 76 BPM | RESPIRATION RATE: 15 BRPM | DIASTOLIC BLOOD PRESSURE: 71 MMHG

## 2018-04-01 PROCEDURE — 700102 HCHG RX REV CODE 250 W/ 637 OVERRIDE(OP): Performed by: OBSTETRICS & GYNECOLOGY

## 2018-04-01 PROCEDURE — A9270 NON-COVERED ITEM OR SERVICE: HCPCS | Performed by: OBSTETRICS & GYNECOLOGY

## 2018-04-01 PROCEDURE — 700112 HCHG RX REV CODE 229: Performed by: OBSTETRICS & GYNECOLOGY

## 2018-04-01 RX ORDER — IBUPROFEN 600 MG/1
600 TABLET ORAL EVERY 6 HOURS PRN
Qty: 30 TAB | Refills: 3 | Status: SHIPPED | OUTPATIENT
Start: 2018-04-01 | End: 2019-06-14

## 2018-04-01 RX ORDER — SENNA AND DOCUSATE SODIUM 50; 8.6 MG/1; MG/1
1 TABLET, FILM COATED ORAL 2 TIMES DAILY
Qty: 60 TAB | Refills: 1 | Status: SHIPPED | OUTPATIENT
Start: 2018-04-01 | End: 2019-06-14

## 2018-04-01 RX ORDER — OXYCODONE HYDROCHLORIDE AND ACETAMINOPHEN 5; 325 MG/1; MG/1
1 TABLET ORAL EVERY 4 HOURS PRN
Qty: 30 TAB | Refills: 0 | Status: SHIPPED | OUTPATIENT
Start: 2018-04-01 | End: 2018-04-08

## 2018-04-01 RX ORDER — SIMETHICONE 80 MG
80 TABLET,CHEWABLE ORAL 4 TIMES DAILY PRN
Qty: 30 TAB | Refills: 3 | Status: SHIPPED | OUTPATIENT
Start: 2018-04-01 | End: 2019-06-14

## 2018-04-01 RX ADMIN — IBUPROFEN 600 MG: 600 TABLET, FILM COATED ORAL at 10:22

## 2018-04-01 RX ADMIN — SIMETHICONE 80 MG: 80 TABLET, CHEWABLE ORAL at 10:21

## 2018-04-01 RX ADMIN — DOCUSATE SODIUM 100 MG: 100 CAPSULE ORAL at 10:22

## 2018-04-01 RX ADMIN — OXYCODONE HYDROCHLORIDE AND ACETAMINOPHEN 1 TABLET: 5; 325 TABLET ORAL at 03:26

## 2018-04-01 RX ADMIN — Medication 1 TABLET: at 10:22

## 2018-04-01 RX ADMIN — IBUPROFEN 600 MG: 600 TABLET, FILM COATED ORAL at 03:26

## 2018-04-01 RX ADMIN — OXYCODONE HYDROCHLORIDE AND ACETAMINOPHEN 1 TABLET: 5; 325 TABLET ORAL at 10:22

## 2018-04-01 ASSESSMENT — PAIN SCALES - GENERAL
PAINLEVEL_OUTOF10: 1
PAINLEVEL_OUTOF10: 5
PAINLEVEL_OUTOF10: 6

## 2018-04-01 NOTE — DISCHARGE SUMMARY
Discharge Summary:      Concetta Hargrove      Admit Date:   3/28/2018  Discharge Date:  2018     Admitting diagnosis:  Pregnancy  Laboring  Indication for care in labor or delivery  Discharge Diagnosis: Status post RLTCS        History:  History reviewed. No pertinent past medical history.  OB History    Para Term  AB Living   2 1           SAB TAB Ectopic Molar Multiple Live Births                    # Outcome Date GA Lbr Saeid/2nd Weight Sex Delivery Anes PTL Lv   2 Current            1 Para                    Ceclor [cefaclor] and Vicodin [hydrocodone-acetaminophen]  There are no active problems to display for this patient.       Hospital Course:   27 y.o. , now para 2, was admitted with the above mentioned diagnosis, underwent Repeat  repeat, without BTL. Patient postpartum course was unremarkable, with progressive advancement in diet , ambulation and toleration of oral analgesia. Patient without complaints today and desires discharge.   Infant in the NICU for respiratory distress.    Vitals:    18 2000 18 0800 18 2100 18 0700   BP: 124/87 105/75 130/92 105/71   Pulse: 92 81 66 76   Resp: 18 17 16 15   Temp: 36.7 °C (98.1 °F) 36.7 °C (98.1 °F) 36.7 °C (98 °F) 36.6 °C (97.8 °F)   TempSrc:       SpO2: 94% 96% 93% 92%   Weight:       Height:           Current Facility-Administered Medications   Medication Dose   • LR infusion     • PRN oxytocin (PITOCIN) (20 Units/1000 mL) PRN for excessive uterine bleeding - See Admin Instr  125-999 mL/hr   • miSOPROStol (CYTOTEC) tablet 800 mcg  800 mcg   • methylergonovine (METHERGINE) injection 0.2 mg  0.2 mg   • carboPROST (HEMABATE) injection 250 mcg  250 mcg   • docusate sodium (COLACE) capsule 100 mg  100 mg   • simethicone (MYLICON) chewable tab 80 mg  80 mg   • prenatal plus vitamin (STUARTNATAL 1+1) 27-1 MG tablet 1 Tab  1 Tab   • ibuprofen (MOTRIN) tablet 600 mg  600 mg   • oxyCODONE-acetaminophen (PERCOCET)  5-325 MG per tablet 1 Tab  1 Tab   • oxyCODONE-acetaminophen (PERCOCET-10)  MG per tablet 1 Tab  1 Tab   • morphine (pf) 4 mg/ml injection 4 mg  4 mg   • ondansetron (ZOFRAN) syringe/vial injection 4 mg  4 mg    Or   • ondansetron (ZOFRAN ODT) dispertab 4 mg  4 mg   • diphenhydrAMINE (BENADRYL) tablet/capsule 25 mg  25 mg    Or   • diphenhydrAMINE (BENADRYL) injection 25 mg  25 mg       Exam:    A, A, and O x 3 NAD    Incision - clean/dry/intact - steri strips in place    Extremities - no clubbing,cyanosis, or edema    Fundus - firm - one cm below the umbilicus    Abdomen - soft and mildly tender to palpation     Labs:  Recent Labs      03/29/18 2026   WBC  11.0*   RBC  3.81*   HEMOGLOBIN  12.9   HEMATOCRIT  36.5*   MCV  95.8   MCH  33.9*   MCHC  35.3*   RDW  42.9   PLATELETCT  169   MPV  10.2        Activity:   Discharge to home  Pelvic Rest x 6 weeks    Assessment:  Stable and ready for discharge.  Baby is still in the NICU.     Follow up: Dr. Barriga in 2 weeks for an incision check and 6 weeks for a postpartum examination.      Discharge Meds:   Current Outpatient Prescriptions   Medication Sig Dispense Refill   • oxyCODONE-acetaminophen (PERCOCET) 5-325 MG Tab Take 1 Tab by mouth every four hours as needed for up to 7 days. 30 Tab 0   • ibuprofen (MOTRIN) 600 MG Tab Take 1 Tab by mouth every 6 hours as needed (For cramping after delivery; do not give if patient is receiving ketorolac (Toradol)). 30 Tab 3   • simethicone (MYLICON) 80 MG Chew Tab Take 1 Tab by mouth 4 times a day as needed (for Abdominal Distention). 30 Tab 3   • sennosides-docusate sodium (SENOKOT-S) 8.6-50 MG tablet Take 1 Tab by mouth 2 times a day. 60 Tab 1       Noreen Cheng M.D.

## 2018-04-01 NOTE — PROGRESS NOTES
1100-Patient alert, oriented.  VSS.  Family at bedside.  Fundus firm @ u, lochia light.  Patient voiding without difficulty.  Patient passing flatus.  Patient medicated for complaints of pain earlier this evening and states good control with oral meds.  Abdominal incision intact with steri strips without erythema, swelling, or drainage.  Patient bonding well with infant in NICU and is pumping her breasts for infant.   1130- Discharge education discussed with patient.  Patient verbalized understanding.  Prescriptions handed to werot.  1300- Patient discharged home in stable condition.  Infant to remain in NICU.

## 2018-04-01 NOTE — CONSULTS
Lactation visit done. Baby in NICU. Pt questions regarding pumping and engorgement answered. Taught how to hand express with return demo.  Will rent HG pump on d/c today. Given followup options for lactation while in NICU and post d/c.

## 2018-04-01 NOTE — PROGRESS NOTES
Assessment complete. Fundus firm, lochia light. VSS. Tolerating diet. Voiding without difficulty. Incision open to air, steri strips in place. Pain controlled with prn medications per mar. Will offer pain medications as they become available. POC discussed with pt. Encouraged to call with needs. Call light in place.

## 2018-04-01 NOTE — CARE PLAN
Problem: Altered physiologic condition related to postoperative  delivery  Goal: Patient physiologically stable as evidenced by normal lochia, palpable uterine involution and vital signs within normal limits  Outcome: PROGRESSING AS EXPECTED  VSS. Fundus firm. Lochia scant

## 2018-04-01 NOTE — CARE PLAN
Problem: Alteration in comfort related to surgical incision and/or after birth pains  Goal: Patient verbalizes acceptable pain level  Outcome: PROGRESSING AS EXPECTED  Pt pain managed with PRN pain meds per MAR.

## 2018-04-01 NOTE — DISCHARGE INSTRUCTIONS
POSTPARTUM DISCHARGE INSTRUCTIONS FOR MOM    YOB: 1990   Age: 27 y.o.               Admit Date: 3/28/2018     Discharge Date: 2018  Attending Doctor:  Bernie Barriga M.D.                  Allergies:  Ceclor [cefaclor] and Vicodin [hydrocodone-acetaminophen]    Discharged to home by car. Discharged via wheelchair, hospital escort: Yes.  Special equipment needed: Not Applicable  Belongings with: Personal  Be sure to schedule a follow-up appointment with your primary care doctor or any specialists as instructed.     Discharge Plan:   Diet Plan: Discussed  Activity Level: Discussed  Confirmed Follow up Appointment: Patient to Call and Schedule Appointment  Confirmed Symptoms Management: Discussed  Medication Reconciliation Updated: Yes  Influenza Vaccine Indication: Not indicated: Previously immunized this influenza season and > 8 years of age    REASONS TO CALL YOUR OBSTETRICIAN:  1.   Persistent fever or shaking chills (Temperature higher than 100.4)  2.   Heavy bleeding (soaking more than 1 pad per hour); Passing clots  3.   Foul odor from vagina  4.   Mastitis (Breast infection; breast pain, chills, fever, redness)  5.   Urinary pain, burning or frequency  6.   Abdominal incision infection  7.   Severe depression longer than 24 hours    HAND WASHING  · Prior to handling the baby.  · Before breastfeeding or bottle feeding baby.  · After using the bathroom or changing the baby's diaper.    WOUND CARE  Ask your physician for additional care instructions.  In general:    ·  Incision:      · Keep clean and dry.    · Do NOT lift anything heavier than your baby for up to 6 weeks.    · There should not be any opening or pus.      VAGINAL CARE  · Nothing inside vagina for 6 weeks: no sexual intercourse, tampons or douching.  · Bleeding may continue for 2-4 weeks.  Amount may vary.    · Call your physician for heavy bleeding which means soaking more than 1 pad per hour    BIRTH CONTROL  · It is possible  "to become pregnant at any time after delivery and while breastfeeding.  · Plan to discuss a method of birth control with your physician at your follow up visit. visit.    DIET AND ELIMINATION  · Eating more fiber (bran cereal, fruits, and vegetables) and drinking plenty of fluids will help to avoid constipation.  · Urinary frequency after childbirth is normal.    POSTPARTUM BLUES  During the first few days after birth, you may experience a sense of the \"blues\" which may include impatience, irritability or even crying.  These feeling come and go quickly.  However, as many as 1 in 10 women experience emotional symptoms known as postpartum depression.    Postpartum depression:  May start as early as the second or third day after delivery or take several weeks or months to develop.  Symptoms of \"blues\" are present, but are more intense:  Crying spells; loss of appetite; feelings of hopelessness or loss of control; fear of touching the baby; over concern or no concern at all about the baby; little or no concern about your own appearance/caring for yourself; and/or inability to sleep or excessive sleeping.  Contact your physician if you are experiencing any of these symptoms.    Crisis Hotline:  · House Crisis Hotline:  0-377-HPBRUPP  Or 1-784.284.8295  · [place] Crisis Hotline:  1-353.285.4579  Or 303-410-7862    PREVENTING SHAKEN BABY:  If you are angry or stressed, PUT THE BABY IN THE CRIB, step away, take some deep breaths, and wait until you are calm to care for the baby.  DO NOT SHAKE THE BABY.  You are not alone, call a supporter for help.    · Crisis Call Center 24/7 crisis line 196-128-3860 or 1-558.845.4912  · You can also text them, text \"ANSWER\" to 644881    QUIT SMOKING/TOBACCO USE:  I understand the use of any tobacco products increases my chance of suffering from future heart disease and could cause other illnesses which may shorten my life. Quitting the use of tobacco products is the single most " important thing I can do to improve my health. For further information on smoking / tobacco cessation call a Toll Free Quit Line at 1-917.772.5817 (*[employer] [place]) or 1-556.972.8676 (American [employer] Association) or you can access the web based program at www.lungusa.org.    · [place] Tobacco Users Help Line:  (244) 519-8043       Toll Free: 1-513.197.1381  · Quit Tobacco Program [facility] (398)232-0841    DEPRESSION / SUICIDE RISK:  As you are discharged from this [facility], it is important to learn how to keep safe from harming yourself.    Recognize the warning signs:  · Abrupt changes in personality, positive or negative- including increase in energy   · Giving away possessions  · Change in eating patterns- significant weight changes-  positive or negative  · Change in sleeping patterns- unable to sleep or sleeping all the time   · Unwillingness or inability to communicate  · Depression  · Unusual sadness, discouragement and loneliness  · Talk of wanting to die  · Neglect of personal appearance   · Rebelliousness- reckless behavior  · Withdrawal from people/activities they love  · Confusion- inability to concentrate     If you or a loved one observes any of these behaviors or has concerns about self-harm, here's what you can do:  · Talk about it- your feelings and reasons for harming yourself  · Remove any means that you might use to hurt yourself (examples: pills, rope, extension cords, firearm)  · Get professional help from the community (Mental Health, Substance Abuse, psychological counseling)  · Do not be alone:Call your Safe Contact- someone whom you trust who will be there for you.  · Call your local CRISIS HOTLINE 547-3459 or 603-469-9210  · Call your local Children's Mobile Crisis Response Team [place] (797) 825-0256 or www.Flixster  · Call the toll free National Suicide Prevention Hotlines   · National Suicide Prevention Lifeline 376-831-GBAB  (7891)  · [facility] 800-SUICIDE (235-4189)    DISCHARGE SURVEY:  Thank you for choosing [facility].  We hope we provided you with very good care.  You may be receiving a survey in the mail.  Please fill it out.  Your opinion is valuable to us.    ADDITIONAL EDUCATIONAL MATERIALS GIVEN TO PATIENT:        My signature on this form indicates that:  1.  I have reviewed and understand the above information  2.  My questions regarding this information have been answered to my satisfaction.  3.  I have formulated a plan with my discharge nurse to obtain my prescribed medication for home.

## 2018-04-22 ENCOUNTER — OFFICE VISIT (OUTPATIENT)
Dept: URGENT CARE | Facility: PHYSICIAN GROUP | Age: 28
End: 2018-04-22
Payer: COMMERCIAL

## 2018-04-22 ENCOUNTER — HOSPITAL ENCOUNTER (OUTPATIENT)
Facility: MEDICAL CENTER | Age: 28
End: 2018-04-22
Attending: NURSE PRACTITIONER
Payer: COMMERCIAL

## 2018-04-22 VITALS
WEIGHT: 148 LBS | DIASTOLIC BLOOD PRESSURE: 86 MMHG | SYSTOLIC BLOOD PRESSURE: 122 MMHG | BODY MASS INDEX: 27.23 KG/M2 | OXYGEN SATURATION: 94 % | RESPIRATION RATE: 20 BRPM | HEIGHT: 62 IN | TEMPERATURE: 99.3 F | HEART RATE: 122 BPM

## 2018-04-22 DIAGNOSIS — R50.9 FEVER, UNSPECIFIED FEVER CAUSE: ICD-10-CM

## 2018-04-22 DIAGNOSIS — N39.0 URINARY TRACT INFECTION WITH HEMATURIA, SITE UNSPECIFIED: ICD-10-CM

## 2018-04-22 DIAGNOSIS — R31.9 URINARY TRACT INFECTION WITH HEMATURIA, SITE UNSPECIFIED: ICD-10-CM

## 2018-04-22 LAB
APPEARANCE UR: NORMAL
BILIRUB UR STRIP-MCNC: NORMAL MG/DL
COLOR UR AUTO: NORMAL
GLUCOSE UR STRIP.AUTO-MCNC: NORMAL MG/DL
KETONES UR STRIP.AUTO-MCNC: NORMAL MG/DL
LEUKOCYTE ESTERASE UR QL STRIP.AUTO: NORMAL
NITRITE UR QL STRIP.AUTO: NORMAL
PH UR STRIP.AUTO: 5 [PH] (ref 5–8)
PROT UR QL STRIP: NORMAL MG/DL
RBC UR QL AUTO: NORMAL
SP GR UR STRIP.AUTO: 1.01
UROBILINOGEN UR STRIP-MCNC: NORMAL MG/DL

## 2018-04-22 PROCEDURE — 87077 CULTURE AEROBIC IDENTIFY: CPT

## 2018-04-22 PROCEDURE — 87186 SC STD MICRODIL/AGAR DIL: CPT

## 2018-04-22 PROCEDURE — 81002 URINALYSIS NONAUTO W/O SCOPE: CPT | Performed by: NURSE PRACTITIONER

## 2018-04-22 PROCEDURE — 96372 THER/PROPH/DIAG INJ SC/IM: CPT | Performed by: NURSE PRACTITIONER

## 2018-04-22 PROCEDURE — 87086 URINE CULTURE/COLONY COUNT: CPT

## 2018-04-22 PROCEDURE — 99214 OFFICE O/P EST MOD 30 MIN: CPT | Mod: 25 | Performed by: NURSE PRACTITIONER

## 2018-04-22 RX ORDER — CEFTRIAXONE 1 G/1
1 INJECTION, POWDER, FOR SOLUTION INTRAMUSCULAR; INTRAVENOUS ONCE
Status: COMPLETED | OUTPATIENT
Start: 2018-04-22 | End: 2018-04-22

## 2018-04-22 RX ORDER — CIPROFLOXACIN 500 MG/1
500 TABLET, FILM COATED ORAL EVERY 12 HOURS
Qty: 14 TAB | Refills: 0 | Status: SHIPPED | OUTPATIENT
Start: 2018-04-22 | End: 2018-04-29

## 2018-04-22 RX ADMIN — CEFTRIAXONE 1 G: 1 INJECTION, POWDER, FOR SOLUTION INTRAMUSCULAR; INTRAVENOUS at 17:48

## 2018-04-23 DIAGNOSIS — N39.0 URINARY TRACT INFECTION WITH HEMATURIA, SITE UNSPECIFIED: ICD-10-CM

## 2018-04-23 DIAGNOSIS — R31.9 URINARY TRACT INFECTION WITH HEMATURIA, SITE UNSPECIFIED: ICD-10-CM

## 2018-04-25 LAB
BACTERIA UR CULT: ABNORMAL
BACTERIA UR CULT: ABNORMAL
SIGNIFICANT IND 70042: ABNORMAL
SITE SITE: ABNORMAL
SOURCE SOURCE: ABNORMAL

## 2018-04-26 ENCOUNTER — HOSPITAL ENCOUNTER (OUTPATIENT)
Dept: RADIOLOGY | Facility: MEDICAL CENTER | Age: 28
End: 2018-04-26
Attending: OBSTETRICS & GYNECOLOGY
Payer: COMMERCIAL

## 2018-04-26 DIAGNOSIS — G89.18 CHEST WALL PAIN FOLLOWING SURGERY: ICD-10-CM

## 2018-04-26 DIAGNOSIS — R07.89 CHEST WALL PAIN FOLLOWING SURGERY: ICD-10-CM

## 2018-04-26 DIAGNOSIS — R50.82 FEVER POSTOP: ICD-10-CM

## 2018-04-26 DIAGNOSIS — R10.9 ABDOMINAL PAIN, UNSPECIFIED ABDOMINAL LOCATION: ICD-10-CM

## 2018-04-26 DIAGNOSIS — I82.890 THROMBOSIS OF OVARIAN VEIN: ICD-10-CM

## 2018-04-26 PROCEDURE — 74177 CT ABD & PELVIS W/CONTRAST: CPT

## 2018-04-26 PROCEDURE — 700117 HCHG RX CONTRAST REV CODE 255: Performed by: OBSTETRICS & GYNECOLOGY

## 2018-04-26 RX ADMIN — IOHEXOL 50 ML: 240 INJECTION, SOLUTION INTRATHECAL; INTRAVASCULAR; INTRAVENOUS; ORAL at 15:10

## 2018-04-26 RX ADMIN — IOHEXOL 100 ML: 350 INJECTION, SOLUTION INTRAVENOUS at 15:10

## 2018-04-26 ASSESSMENT — ENCOUNTER SYMPTOMS
ABDOMINAL PAIN: 0
FEVER: 1
NAUSEA: 0
VOMITING: 0
FLANK PAIN: 0

## 2018-05-10 ENCOUNTER — HOSPITAL ENCOUNTER (OUTPATIENT)
Dept: LAB | Facility: MEDICAL CENTER | Age: 28
End: 2018-05-10
Attending: OBSTETRICS & GYNECOLOGY
Payer: COMMERCIAL

## 2018-05-10 LAB — CYTOLOGY REG CYTOL: NORMAL

## 2018-05-10 PROCEDURE — 88175 CYTOPATH C/V AUTO FLUID REDO: CPT

## 2018-10-17 ENCOUNTER — HOSPITAL ENCOUNTER (OUTPATIENT)
Dept: LAB | Facility: MEDICAL CENTER | Age: 28
End: 2018-10-17
Attending: OBSTETRICS & GYNECOLOGY
Payer: COMMERCIAL

## 2018-10-17 LAB — B-HCG SERPL-ACNC: <0.6 MIU/ML (ref 0–5)

## 2018-10-17 PROCEDURE — 84702 CHORIONIC GONADOTROPIN TEST: CPT

## 2018-10-17 PROCEDURE — 36415 COLL VENOUS BLD VENIPUNCTURE: CPT

## 2019-02-21 ENCOUNTER — TELEPHONE (OUTPATIENT)
Dept: SCHEDULING | Facility: IMAGING CENTER | Age: 29
End: 2019-02-21

## 2019-06-14 ENCOUNTER — OFFICE VISIT (OUTPATIENT)
Dept: URGENT CARE | Facility: PHYSICIAN GROUP | Age: 29
End: 2019-06-14
Payer: COMMERCIAL

## 2019-06-14 ENCOUNTER — APPOINTMENT (OUTPATIENT)
Dept: RADIOLOGY | Facility: MEDICAL CENTER | Age: 29
DRG: 872 | End: 2019-06-14
Payer: COMMERCIAL

## 2019-06-14 ENCOUNTER — HOSPITAL ENCOUNTER (OUTPATIENT)
Dept: RADIOLOGY | Facility: MEDICAL CENTER | Age: 29
End: 2019-06-14
Attending: FAMILY MEDICINE
Payer: COMMERCIAL

## 2019-06-14 ENCOUNTER — HOSPITAL ENCOUNTER (INPATIENT)
Facility: MEDICAL CENTER | Age: 29
LOS: 2 days | DRG: 872 | End: 2019-06-16
Attending: EMERGENCY MEDICINE | Admitting: HOSPITALIST
Payer: COMMERCIAL

## 2019-06-14 ENCOUNTER — HOSPITAL ENCOUNTER (OUTPATIENT)
Dept: LAB | Facility: MEDICAL CENTER | Age: 29
End: 2019-06-14
Attending: FAMILY MEDICINE
Payer: COMMERCIAL

## 2019-06-14 VITALS
DIASTOLIC BLOOD PRESSURE: 68 MMHG | RESPIRATION RATE: 20 BRPM | OXYGEN SATURATION: 96 % | WEIGHT: 140 LBS | HEART RATE: 124 BPM | SYSTOLIC BLOOD PRESSURE: 142 MMHG | BODY MASS INDEX: 25.76 KG/M2 | TEMPERATURE: 100.5 F | HEIGHT: 62 IN

## 2019-06-14 DIAGNOSIS — R50.9 FEVER, UNSPECIFIED FEVER CAUSE: ICD-10-CM

## 2019-06-14 DIAGNOSIS — R10.84 GENERALIZED ABDOMINAL PAIN: ICD-10-CM

## 2019-06-14 DIAGNOSIS — D72.829 LEUKOCYTOSIS, UNSPECIFIED TYPE: ICD-10-CM

## 2019-06-14 DIAGNOSIS — N70.93 TUBO-OVARIAN ABSCESS: ICD-10-CM

## 2019-06-14 LAB
ALBUMIN SERPL BCP-MCNC: 4.4 G/DL (ref 3.2–4.9)
ALBUMIN/GLOB SERPL: 1.3 G/DL
ALP SERPL-CCNC: 122 U/L (ref 30–99)
ALT SERPL-CCNC: 30 U/L (ref 2–50)
ANION GAP SERPL CALC-SCNC: 15 MMOL/L (ref 0–11.9)
APPEARANCE UR: CLEAR
AST SERPL-CCNC: 43 U/L (ref 12–45)
BASOPHILS # BLD AUTO: 0.3 % (ref 0–1.8)
BASOPHILS # BLD: 0.04 K/UL (ref 0–0.12)
BILIRUB SERPL-MCNC: 0.6 MG/DL (ref 0.1–1.5)
BILIRUB UR STRIP-MCNC: NORMAL MG/DL
BUN SERPL-MCNC: 8 MG/DL (ref 8–22)
CALCIUM SERPL-MCNC: 9.3 MG/DL (ref 8.5–10.5)
CHLORIDE SERPL-SCNC: 103 MMOL/L (ref 96–112)
CO2 SERPL-SCNC: 23 MMOL/L (ref 20–33)
COLOR UR AUTO: NORMAL
CREAT SERPL-MCNC: 0.74 MG/DL (ref 0.5–1.4)
EOSINOPHIL # BLD AUTO: 0.02 K/UL (ref 0–0.51)
EOSINOPHIL NFR BLD: 0.1 % (ref 0–6.9)
ERYTHROCYTE [DISTWIDTH] IN BLOOD BY AUTOMATED COUNT: 38.1 FL (ref 35.9–50)
FASTING STATUS PATIENT QL REPORTED: NORMAL
GLOBULIN SER CALC-MCNC: 3.4 G/DL (ref 1.9–3.5)
GLUCOSE SERPL-MCNC: 103 MG/DL (ref 65–99)
GLUCOSE UR STRIP.AUTO-MCNC: NORMAL MG/DL
HCT VFR BLD AUTO: 40 % (ref 37–47)
HGB BLD-MCNC: 13.7 G/DL (ref 12–16)
IMM GRANULOCYTES # BLD AUTO: 0.06 K/UL (ref 0–0.11)
IMM GRANULOCYTES NFR BLD AUTO: 0.4 % (ref 0–0.9)
INT CON NEG: NEGATIVE
INT CON POS: POSITIVE
KETONES UR STRIP.AUTO-MCNC: 160 MG/DL
LACTATE BLD-SCNC: 0.8 MMOL/L (ref 0.5–2)
LEUKOCYTE ESTERASE UR QL STRIP.AUTO: NORMAL
LIPASE SERPL-CCNC: 7 U/L (ref 11–82)
LYMPHOCYTES # BLD AUTO: 0.66 K/UL (ref 1–4.8)
LYMPHOCYTES NFR BLD: 4.7 % (ref 22–41)
MCH RBC QN AUTO: 31.3 PG (ref 27–33)
MCHC RBC AUTO-ENTMCNC: 34.3 G/DL (ref 33.6–35)
MCV RBC AUTO: 91.3 FL (ref 81.4–97.8)
MONOCYTES # BLD AUTO: 0.56 K/UL (ref 0–0.85)
MONOCYTES NFR BLD AUTO: 4 % (ref 0–13.4)
NEUTROPHILS # BLD AUTO: 12.76 K/UL (ref 2–7.15)
NEUTROPHILS NFR BLD: 90.5 % (ref 44–72)
NITRITE UR QL STRIP.AUTO: NORMAL
NRBC # BLD AUTO: 0 K/UL
NRBC BLD-RTO: 0 /100 WBC
PH UR STRIP.AUTO: 5.5 [PH] (ref 5–8)
PLATELET # BLD AUTO: 283 K/UL (ref 164–446)
PMV BLD AUTO: 9.9 FL (ref 9–12.9)
POC URINE PREGNANCY TEST: NEGATIVE
POTASSIUM SERPL-SCNC: 3.5 MMOL/L (ref 3.6–5.5)
PROT SERPL-MCNC: 7.8 G/DL (ref 6–8.2)
PROT UR QL STRIP: 30 MG/DL
RBC # BLD AUTO: 4.38 M/UL (ref 4.2–5.4)
RBC UR QL AUTO: NORMAL
SODIUM SERPL-SCNC: 141 MMOL/L (ref 135–145)
SP GR UR STRIP.AUTO: 1.02
UROBILINOGEN UR STRIP-MCNC: NORMAL MG/DL
WBC # BLD AUTO: 14.1 K/UL (ref 4.8–10.8)

## 2019-06-14 PROCEDURE — 99285 EMERGENCY DEPT VISIT HI MDM: CPT

## 2019-06-14 PROCEDURE — 700101 HCHG RX REV CODE 250: Performed by: EMERGENCY MEDICINE

## 2019-06-14 PROCEDURE — 36415 COLL VENOUS BLD VENIPUNCTURE: CPT

## 2019-06-14 PROCEDURE — 81025 URINE PREGNANCY TEST: CPT | Performed by: FAMILY MEDICINE

## 2019-06-14 PROCEDURE — 770020 HCHG ROOM/CARE - TELE (206)

## 2019-06-14 PROCEDURE — 700102 HCHG RX REV CODE 250 W/ 637 OVERRIDE(OP): Performed by: STUDENT IN AN ORGANIZED HEALTH CARE EDUCATION/TRAINING PROGRAM

## 2019-06-14 PROCEDURE — 700102 HCHG RX REV CODE 250 W/ 637 OVERRIDE(OP): Performed by: HOSPITALIST

## 2019-06-14 PROCEDURE — A9270 NON-COVERED ITEM OR SERVICE: HCPCS | Performed by: HOSPITALIST

## 2019-06-14 PROCEDURE — 99214 OFFICE O/P EST MOD 30 MIN: CPT | Performed by: FAMILY MEDICINE

## 2019-06-14 PROCEDURE — 74177 CT ABD & PELVIS W/CONTRAST: CPT

## 2019-06-14 PROCEDURE — 700111 HCHG RX REV CODE 636 W/ 250 OVERRIDE (IP): Performed by: EMERGENCY MEDICINE

## 2019-06-14 PROCEDURE — 700111 HCHG RX REV CODE 636 W/ 250 OVERRIDE (IP): Performed by: STUDENT IN AN ORGANIZED HEALTH CARE EDUCATION/TRAINING PROGRAM

## 2019-06-14 PROCEDURE — 80053 COMPREHEN METABOLIC PANEL: CPT

## 2019-06-14 PROCEDURE — 700105 HCHG RX REV CODE 258: Performed by: STUDENT IN AN ORGANIZED HEALTH CARE EDUCATION/TRAINING PROGRAM

## 2019-06-14 PROCEDURE — 81002 URINALYSIS NONAUTO W/O SCOPE: CPT | Performed by: FAMILY MEDICINE

## 2019-06-14 PROCEDURE — 83605 ASSAY OF LACTIC ACID: CPT

## 2019-06-14 PROCEDURE — 700105 HCHG RX REV CODE 258: Performed by: EMERGENCY MEDICINE

## 2019-06-14 PROCEDURE — 83690 ASSAY OF LIPASE: CPT

## 2019-06-14 PROCEDURE — 99223 1ST HOSP IP/OBS HIGH 75: CPT | Mod: GC | Performed by: HOSPITALIST

## 2019-06-14 PROCEDURE — A9270 NON-COVERED ITEM OR SERVICE: HCPCS | Performed by: STUDENT IN AN ORGANIZED HEALTH CARE EDUCATION/TRAINING PROGRAM

## 2019-06-14 PROCEDURE — 87040 BLOOD CULTURE FOR BACTERIA: CPT | Mod: 91

## 2019-06-14 PROCEDURE — 85025 COMPLETE CBC W/AUTO DIFF WBC: CPT

## 2019-06-14 PROCEDURE — 96365 THER/PROPH/DIAG IV INF INIT: CPT

## 2019-06-14 RX ORDER — DOXYCYCLINE 100 MG/1
100 TABLET ORAL EVERY 12 HOURS
Status: DISCONTINUED | OUTPATIENT
Start: 2019-06-14 | End: 2019-06-16 | Stop reason: HOSPADM

## 2019-06-14 RX ORDER — ACETAMINOPHEN 325 MG/1
650 TABLET ORAL EVERY 6 HOURS PRN
Status: DISCONTINUED | OUTPATIENT
Start: 2019-06-14 | End: 2019-06-16 | Stop reason: HOSPADM

## 2019-06-14 RX ORDER — OXYCODONE HYDROCHLORIDE 5 MG/1
5 TABLET ORAL EVERY 6 HOURS PRN
Status: DISCONTINUED | OUTPATIENT
Start: 2019-06-14 | End: 2019-06-14

## 2019-06-14 RX ORDER — AMOXICILLIN 250 MG
2 CAPSULE ORAL 2 TIMES DAILY
Status: DISCONTINUED | OUTPATIENT
Start: 2019-06-14 | End: 2019-06-16 | Stop reason: HOSPADM

## 2019-06-14 RX ORDER — IBUPROFEN 200 MG
400-600 TABLET ORAL EVERY 6 HOURS PRN
Status: ON HOLD | COMMUNITY
End: 2019-06-16

## 2019-06-14 RX ORDER — SODIUM CHLORIDE 9 MG/ML
30 INJECTION, SOLUTION INTRAVENOUS ONCE
Status: COMPLETED | OUTPATIENT
Start: 2019-06-14 | End: 2019-06-14

## 2019-06-14 RX ORDER — POLYETHYLENE GLYCOL 3350 17 G/17G
1 POWDER, FOR SOLUTION ORAL
Status: DISCONTINUED | OUTPATIENT
Start: 2019-06-14 | End: 2019-06-16 | Stop reason: HOSPADM

## 2019-06-14 RX ORDER — MORPHINE SULFATE 4 MG/ML
2 INJECTION, SOLUTION INTRAMUSCULAR; INTRAVENOUS EVERY 4 HOURS PRN
Status: DISCONTINUED | OUTPATIENT
Start: 2019-06-14 | End: 2019-06-15

## 2019-06-14 RX ORDER — CETIRIZINE HYDROCHLORIDE, PSEUDOEPHEDRINE HYDROCHLORIDE 5; 120 MG/1; MG/1
1 TABLET, FILM COATED, EXTENDED RELEASE ORAL
COMMUNITY
End: 2020-01-09

## 2019-06-14 RX ORDER — BISACODYL 10 MG
10 SUPPOSITORY, RECTAL RECTAL
Status: DISCONTINUED | OUTPATIENT
Start: 2019-06-14 | End: 2019-06-16 | Stop reason: HOSPADM

## 2019-06-14 RX ORDER — SODIUM CHLORIDE 9 MG/ML
INJECTION, SOLUTION INTRAVENOUS CONTINUOUS
Status: DISCONTINUED | OUTPATIENT
Start: 2019-06-14 | End: 2019-06-15

## 2019-06-14 RX ORDER — METRONIDAZOLE 500 MG/1
500 TABLET ORAL EVERY 8 HOURS
Status: DISCONTINUED | OUTPATIENT
Start: 2019-06-14 | End: 2019-06-16 | Stop reason: HOSPADM

## 2019-06-14 RX ADMIN — DOXYCYCLINE 100 MG: 100 INJECTION, POWDER, LYOPHILIZED, FOR SOLUTION INTRAVENOUS at 13:11

## 2019-06-14 RX ADMIN — ACETAMINOPHEN 650 MG: 325 TABLET, FILM COATED ORAL at 23:14

## 2019-06-14 RX ADMIN — METRONIDAZOLE 500 MG: 500 TABLET, FILM COATED ORAL at 15:51

## 2019-06-14 RX ADMIN — SENNOSIDES,DOCUSATE SODIUM 2 TABLET: 8.6; 5 TABLET, FILM COATED ORAL at 18:10

## 2019-06-14 RX ADMIN — METRONIDAZOLE 500 MG: 500 TABLET, FILM COATED ORAL at 23:14

## 2019-06-14 RX ADMIN — SODIUM CHLORIDE 1908 ML: 9 INJECTION, SOLUTION INTRAVENOUS at 12:45

## 2019-06-14 RX ADMIN — CEFTRIAXONE SODIUM 1 G: 1 INJECTION, POWDER, FOR SOLUTION INTRAMUSCULAR; INTRAVENOUS at 16:01

## 2019-06-14 RX ADMIN — DOXYCYCLINE 100 MG: 100 TABLET, FILM COATED ORAL at 18:10

## 2019-06-14 RX ADMIN — ACETAMINOPHEN 650 MG: 325 TABLET, FILM COATED ORAL at 15:52

## 2019-06-14 RX ADMIN — SODIUM CHLORIDE: 900 INJECTION INTRAVENOUS at 16:01

## 2019-06-14 ASSESSMENT — LIFESTYLE VARIABLES
EVER_SMOKED: NEVER
DO YOU DRINK ALCOHOL: NO

## 2019-06-14 ASSESSMENT — ENCOUNTER SYMPTOMS
NECK PAIN: 0
NAUSEA: 1
DEPRESSION: 0
DOUBLE VISION: 0
COUGH: 0
ABDOMINAL PAIN: 1
PALPITATIONS: 1
BLURRED VISION: 0
HEADACHES: 0
SORE THROAT: 0
NERVOUS/ANXIOUS: 0
COUGH: 0
BACK PAIN: 0
SORE THROAT: 0
DIARRHEA: 0
HEADACHES: 0
CHILLS: 0
FEVER: 1
DIZZINESS: 0
NAUSEA: 1
ABDOMINAL PAIN: 1
VOMITING: 0
DIARRHEA: 0
VOMITING: 0
SHORTNESS OF BREATH: 0
SHORTNESS OF BREATH: 0
FEVER: 1

## 2019-06-14 ASSESSMENT — COGNITIVE AND FUNCTIONAL STATUS - GENERAL
SUGGESTED CMS G CODE MODIFIER DAILY ACTIVITY: CH
SUGGESTED CMS G CODE MODIFIER MOBILITY: CH
DAILY ACTIVITIY SCORE: 24
MOBILITY SCORE: 24

## 2019-06-14 ASSESSMENT — PATIENT HEALTH QUESTIONNAIRE - PHQ9
2. FEELING DOWN, DEPRESSED, IRRITABLE, OR HOPELESS: NOT AT ALL
SUM OF ALL RESPONSES TO PHQ9 QUESTIONS 1 AND 2: 0
1. LITTLE INTEREST OR PLEASURE IN DOING THINGS: NOT AT ALL

## 2019-06-14 NOTE — PROGRESS NOTES
Pt arrived to T414-1. VSS. Alert, oriented. Ambulatory with steady gait.   Pain medicated per MAR, declines use of narcotics.   Tele monitor placed per order, sinus rhythm.  IV abx running.  Oriented to room and hospital rules.   Updated on plan of care. Awaiting gynecologic consult.  Safety education provided. Bed locked in low. Call light within reach. Rounding in place.

## 2019-06-14 NOTE — PROGRESS NOTES
"Was asked by Dr. Back for antibiotic recommendations. Initially recommended Zosyn, however OB/GYN prefers cephalosporin + doxycycline + metronidazole. Patient with noted cefaclor allergy (\"stopped breathing\").      Per review of medical record, patient received a dose of Rocephin in April 2018. Discussed with patient, who denies any allergic reaction at that time. Discussed fairly recent history of tolerating ceftriaxone with ER physician, who agrees to proceed with ceftriaxone at this time.     Mehrdad Lemus, PharmD     "

## 2019-06-14 NOTE — ED TRIAGE NOTES
Pt comes in reporting a fever since Sunday. Pt was at  and had a CT completed showing a pelvic abscess. Pt tearful.

## 2019-06-14 NOTE — ASSESSMENT & PLAN NOTE
-Patient presented with right lower quadrant abdominal pain associated with fever, chills and nausea for the past 5 days  -CT abdomen showed are suggestive of tubo-ovarian mass, meets sepsis criteria  -Gynecologist recommended continuing antibiotics  -Transvaginal ultrasound completed,consistent with tubo-ovarian abscess    Plan  -antibiotics per gynaecologist  -Pain management with ibuprofen/tylenol  -F/U with gynecologist as outpatient

## 2019-06-14 NOTE — ED PROVIDER NOTES
ED Provider Note    CHIEF COMPLAINT  Chief Complaint   Patient presents with   • Abdominal Pain   • Fever   • Sent from Urgent Care       HPI  Concetta Hargrove is a 29 y.o. female who presents with right lower quadrant pain, onset approximately 5 days ago last Sunday.  Pain is been constantly worsening, now associated with fever.  The pain is worse with movement.  She denies trauma.  No dysuria, no flank pain.  Patient seen at urgent care, found to have elevated white blood cell count of 15 and CT scan which showed tubo-ovarian abscess.  Patient referred to the emergency department.  She has been n.p.o. since last evening at suppertime.  No chest pain or shortness of breath.  Patient states pain is sharp in quality, constant.    REVIEW OF SYSTEMS  Constitutional: Fever  Respiratory: No shortness of breath  Cardiac: No chest pain or syncope  Gastrointestinal: Abdominal pain, nausea  Musculoskeletal: No flank pain  Neurologic: No headache  Genitourinary: No dysuria, no vaginal bleeding       All other systems are negative.     PAST MEDICAL HISTORY  History reviewed. No pertinent past medical history.    FAMILY HISTORY  Family History   Problem Relation Age of Onset   • Hypertension Maternal Grandmother        SOCIAL HISTORY  Social History     Social History   • Marital status:      Spouse name: N/A   • Number of children: N/A   • Years of education: N/A     Social History Main Topics   • Smoking status: Never Smoker   • Smokeless tobacco: Never Used   • Alcohol use No      Comment: occasional   • Drug use: No   • Sexual activity: Not on file     Other Topics Concern   • Not on file     Social History Narrative   • No narrative on file       SURGICAL HISTORY  Past Surgical History:   Procedure Laterality Date   • REPEAT C SECTION N/A 3/28/2018    Procedure: REPEAT C SECTION;  Surgeon: Bernie Barriga M.D.;  Location: LABOR AND DELIVERY;  Service: Labor and Delivery   • PRIMARY C SECTION  7/6/2015     "Procedure: PRIMARY C SECTION;  Surgeon: Bernie Barriga M.D.;  Location: LABOR AND DELIVERY;  Service:    • ABDOMINAL EXPLORATION     • APPENDECTOMY     • TONSILLECTOMY         CURRENT MEDICATIONS  Home Medications     Reviewed by Tamela Urban R.N. (Registered Nurse) on 06/14/19 at 1621  Med List Status: Complete   Medication Last Dose Status   cetirizine-psuedoephedrine (ZYRTEC-D ALLERGY & CONGESTION) 5-120 MG per tablet <2weeks Active   ibuprofen (MOTRIN) 200 MG Tab 6/12/2019 Active                ALLERGIES  Allergies   Allergen Reactions   • Ceclor [Cefaclor] Unspecified     Stopped breathing  Tolerated ceftriaxone 4/2018    • Hydrocodone Vomiting     Tolerated oxycodone - 2018       PHYSICAL EXAM  VITAL SIGNS: /80   Pulse 99   Temp 37.6 °C (99.6 °F) (Oral)   Resp 20   Ht 1.575 m (5' 2\")   Wt 63.6 kg (140 lb 3.4 oz)   LMP 05/25/2019 (Exact Date)   SpO2 97%   Breastfeeding? No   BMI 25.65 kg/m²   Constitutional: Well-nourished, no distress  ENT: Nares clear, mucous membranes moist.  Eyes:  Conjunctiva normal, No discharge.    Lymphatic: No adenopathy.   Cardiovascular: Tachycardic heart rate, Normal rhythm.   Pulmonary: No wheezing, no rales  Gastrointestinal: Moderate tenderness right lower quadrant.  Skin: Warm, Dry, No jaundice.   Musculoskeletal:  No CVA tenderness.   Neurologic:  Normal motor and sensory function, No focal deficits noted.   Psychiatric:Normal affect, Normal mood.    RADIOLOGY/PROCEDURES/Labs  Results for orders placed or performed during the hospital encounter of 06/14/19   Lactic acid (lactate)   Result Value Ref Range    Lactic Acid 0.8 0.5 - 2.0 mmol/L     White blood cell count from outpatient labs 15    CT scan abdomen and pelvis:  1.  Rim-enhancing tubular structure surrounding the ill-defined right ovary with significant surrounding inflammatory stranding. Findings are consistent with tubo-ovarian abscess.    2.  Additional rim-enhancing fluid collection in the " cul-de-sac is consistent with an abscess.      COURSE & MEDICAL DECISION MAKING  Pertinent Labs & Imaging studies reviewed. (See chart for details)  Source of infection of abscess, tachycardia and elevated white blood cell count concerning for sepsis therefore patient ordered Zosyn and ordered IV fluid bolus.  Lactic acid returned to normal.  Case was discussed with on-call gynecology Dr. Green who recommended cephalosporin, doxycycline and Flagyl.  This was discussed with clinical pharmacist, patient found to have tolerated Rocephin previously despite cephalosporin allergy.  Patient was given all 3 medications as noted above.  The patient is admitted to medicine with gynecology to consult.    FINAL IMPRESSION  1. Tubo-ovarian abscess    2. Leukocytosis, unspecified type            Electronically signed by: Jabari Back, 6/14/2019 6:09 PM

## 2019-06-14 NOTE — CARE PLAN
Problem: Infection  Goal: Will remain free from infection  Outcome: PROGRESSING AS EXPECTED  Monitoring labs, vital signs. IV abx in use. Sepsis protocol completed in ED. Fluids running.     Problem: Pain Management  Goal: Pain level will decrease to patient's comfort goal  Outcome: PROGRESSING AS EXPECTED  Pt describes pain as 10/10 intermittent sharp. Declined use of narcotics. Tylenol administered per MAR.

## 2019-06-14 NOTE — ED NOTES
Medication Reconciliation has been completed by interviewing patient with consent to do so with family/visitors in the room.    Allergies were reviewed and updated    Antibiotics in the past 30 days:none    Pharmacy: [facility]

## 2019-06-14 NOTE — PROGRESS NOTES
Subjective:     Concetta Hargrove is a 29 y.o. female who presents for Abdominal Pain (lower abd pain, fever, nausea and dizzy )    HPI  Pt presents for evaluation of a new problem   Pt feeling ill the past 5 days   Feeling fatigued, nauseated, dizzy, and overall not well  Heart feels like it is beating faster than usual  Feels very dizzy with standing  Has nausea with eating, no vomiting  Having abd pain which is generalized and worst in the lower abdomen   Abdominal pain is constant, slowly worsening, and worsens with walking or standing  Having fevers at home the past 5 days   Highest fever 102  No urinary symptoms  No diarrhea/constipation  No blood in stool  No vaginal bleeding or discharge    History of severe urinary tract infections  History of 2 C-sections and appendectomy    Review of Systems   Constitutional: Positive for fever and malaise/fatigue.   HENT: Negative for congestion and sore throat.    Respiratory: Negative for cough and shortness of breath.    Cardiovascular: Negative for chest pain.   Gastrointestinal: Positive for abdominal pain and nausea. Negative for diarrhea and vomiting.   Skin: Negative for rash.   Neurological: Negative for headaches.     PMH:  has no past medical history of Asthma or Type II or unspecified type diabetes mellitus without mention of complication, not stated as uncontrolled.  MEDS:   Current Outpatient Prescriptions:   •  ibuprofen (MOTRIN) 600 MG Tab, Take 1 Tab by mouth every 6 hours as needed (For cramping after delivery; do not give if patient is receiving ketorolac (Toradol))., Disp: 30 Tab, Rfl: 3  •  simethicone (MYLICON) 80 MG Chew Tab, Take 1 Tab by mouth 4 times a day as needed (for Abdominal Distention). (Patient not taking: Reported on 6/14/2019), Disp: 30 Tab, Rfl: 3  •  sennosides-docusate sodium (SENOKOT-S) 8.6-50 MG tablet, Take 1 Tab by mouth 2 times a day. (Patient not taking: Reported on 6/14/2019), Disp: 60 Tab, Rfl: 1  •  aspirin (ASA) 81 MG  "Chew Tab chewable tablet, Take 81 mg by mouth every day., Disp: , Rfl:   •  Prenatal Vit-Fe Sulfate-FA (PRENATAL VITAMIN PO), Take  by mouth., Disp: , Rfl:   ALLERGIES:   Allergies   Allergen Reactions   • Ceclor [Cefaclor]      Stopped breathing   • Vicodin [Hydrocodone-Acetaminophen] Nausea     SURGHX:   Past Surgical History:   Procedure Laterality Date   • REPEAT C SECTION N/A 3/28/2018    Procedure: REPEAT C SECTION;  Surgeon: Bernie Barriga M.D.;  Location: LABOR AND DELIVERY;  Service: Labor and Delivery   • PRIMARY C SECTION  7/6/2015    Procedure: PRIMARY C SECTION;  Surgeon: Bernie Barriga M.D.;  Location: LABOR AND DELIVERY;  Service:    • ABDOMINAL EXPLORATION     • APPENDECTOMY     • TONSILLECTOMY       SOCHX:  reports that she has never smoked. She has never used smokeless tobacco. She reports that she does not drink alcohol or use drugs.  FH: Family history was reviewed, not contributing to acute illness     Objective:   /68 (BP Location: Right arm, Patient Position: Sitting, BP Cuff Size: Adult)   Pulse (!) 124   Temp (!) 38.1 °C (100.5 °F) (Temporal)   Resp 20   Ht 1.575 m (5' 2\")   Wt 63.5 kg (140 lb)   LMP 05/25/2019 (Exact Date)   SpO2 96%   Breastfeeding? Unknown   BMI 25.61 kg/m²      Physical Exam   Constitutional: She appears well-developed and well-nourished. No distress.   HENT:   Head: Normocephalic and atraumatic.   Eyes: Conjunctivae and EOM are normal. Right eye exhibits no discharge. Left eye exhibits no discharge. No scleral icterus.   Neck: Normal range of motion. No tracheal deviation present.   Cardiovascular: Normal rate and regular rhythm.    Pulmonary/Chest: Effort normal and breath sounds normal. No respiratory distress. She has no wheezes. She has no rales.   Abdominal:   Abdomen soft, tender to palpation in lower quadrants and suprapubic area, no rebound, no guarding, hyperactive bowel sounds   Neurological: She is alert.   Skin: Skin is warm and dry. No rash " noted. She is not diaphoretic.   Psychiatric: She has a normal mood and affect. Her behavior is normal. Judgment and thought content normal.     Assessment/Plan:   Assessment    1. Fever, unspecified fever cause  2. Generalized abdominal pain  Patient is a 29-year-old female with fever and generalized abdominal pain.  T-max 102, temperature 100.5 today in office.  Patient is tachycardic and tender in the lower quadrants.  Unclear exact etiology of her pain, however highest concern for tubo-ovarian abscess versus colitis.  Point-of-care urine not consistent with UTI and point-of-care urine pregnancy negative.  Will obtain labs and abdominal CT.  - CT-ABDOMEN-PELVIS WITH; Future  - CBC WITH DIFFERENTIAL; Future  - Comp Metabolic Panel; Future  - POCT Urinalysis  - POCT Pregnancy  - LIPASE; Future    Update:   Abdominal CT shows the followin.  Rim-enhancing tubular structure surrounding the ill-defined right ovary with significant surrounding inflammatory stranding. Findings are consistent with tubo-ovarian abscess.  2.  Additional rim-enhancing fluid collection in the cul-de-sac is consistent with an abscess.    Discussed the results with pt in office and she was sent to the ER for further management.  Called and given report to [facility].

## 2019-06-14 NOTE — H&P
Internal Medicine Admitting History and Physical    Note Author: Debbie Frank M.D.       Name Concetta Hargrove     1990   Age/Sex 29 y.o. female   MRN [id number]   Code Status Full     After 5PM or if no immediate response to page, please call for cross-coverage  Attending/Team: Dr. Beckett/Aneesh See Patient List for primary contact information  Call [phone] to page    1st Call - Day Intern (R1):    2nd Call - Day Sr. Resident (R2/R3):   Dr. Dennison/Dr. James     Chief Complaint:   Abdominal pain    HPI:  Ms. Hargrove, a pleasant 29-year-old female presented to the ER referred by urgent care with complaint of right lower quadrant abdominal pain for the past 5 days, intermittent, sharp pain 8/10 in intensity, nonradiating, worse with palpation, walking and sitting in the car, better when lying still associated with 5 days of fever, Tmax 102 °F (T range 100 to 102 °F), chills and nausea.  Pain has been gradually worsening to the point that about 3 AM this morning while going to the restroom she felt like she was going to pass out that scared her.  Patient denies any dizziness, blurry vision, vomiting, diarrhea, dysuria, frequency or vaginal discharge.      In the ER, patient was afebrile, tachycardic with heart rate of 124, tachypneic with respiratory rate of 20 saturating at 96% on room air.  Results of her labs showed leukocytosis with neutrophilic predominance.  Electrolyte imbalance with potassium of 3.5.  Normal lactic acid and urinalysis.  Negative for pregnancy test.  CT abdomen/pelvis showed findings consistent with tubo-ovarian abscess.  Patient received 2 L of normal saline, 1 g of ceftriaxone, 100 mg of doxycycline IV and primary milligram of metronidazole IV. Patient will continue with the above antibiotics as inpatient pending gyn evaluation.    Review of Systems   Constitutional: Positive for fever. Negative for chills.   HENT: Negative for congestion and sore  throat.    Eyes: Negative for blurred vision and double vision.   Respiratory: Negative for cough and shortness of breath.    Cardiovascular: Positive for palpitations. Negative for chest pain.   Gastrointestinal: Positive for abdominal pain and nausea. Negative for diarrhea and vomiting.   Genitourinary: Negative for dysuria and frequency.   Musculoskeletal: Negative for back pain and neck pain.   Neurological: Negative for dizziness and headaches.   Psychiatric/Behavioral: Negative for depression. The patient is not nervous/anxious.      Past Medical History (Chronic medical problem, known complications and current treatment)    None     Past Surgical History:  Past Surgical History:   Procedure Laterality Date   • REPEAT C SECTION N/A 3/28/2018    Procedure: REPEAT C SECTION;  Surgeon: Bernie Barriga M.D.;  Location: LABOR AND DELIVERY;  Service: Labor and Delivery   • PRIMARY C SECTION  2015    Procedure: PRIMARY C SECTION;  Surgeon: Bernie Barriag M.D.;  Location: LABOR AND DELIVERY;  Service:    • ABDOMINAL EXPLORATION     • APPENDECTOMY     • TONSILLECTOMY       Obstetric/gynecology history  - 2, para 2, living 2 -children age 1 year and 4 years  -Last menstrual period 2019  -No history of sexually transmitted infections  -Currently sexually active with one partner    Current Outpatient Medications:  Home Medications     Reviewed by Tamela Urban R.N. (Registered Nurse) on 19 at 1621  Med List Status: Complete   Medication Last Dose Status   cetirizine-psuedoephedrine (ZYRTEC-D ALLERGY & CONGESTION) 5-120 MG per tablet <2weeks Active   ibuprofen (MOTRIN) 200 MG Tab 2019 Active              Medication Allergy/Sensitivities:  Allergies   Allergen Reactions   • Ceclor [Cefaclor] Unspecified     Stopped breathing  Tolerated ceftriaxone 2018    • Hydrocodone Vomiting     Tolerated oxycodone - 2018     Family History (mandatory)   Positive family history of uterine cancer in  "mother, maternal grandmother and maternal great-grandmother and  No family history of hypertension, diabetes mellitus or other cancers    Family History   Problem Relation Age of Onset   • Hypertension Maternal Grandmother      Social History (mandatory)   Social History     Social History   • Marital status:      Spouse name: N/A   • Number of children: N/A   • Years of education: N/A     Occupational History   • Not on file.     Social History Main Topics   • Smoking status: Never Smoker   • Smokeless tobacco: Never Used   • Alcohol use No      Comment: occasional   • Drug use: No   • Sexual activity: Not on file     Other Topics Concern   • Not on file     Social History Narrative   • No narrative on file     Living situation: Stable  PCP : Janice Edmondson M.D.    Physical Exam     Vitals:    06/14/19 1542 06/14/19 1916 06/14/19 2311 06/15/19 0347   BP: 134/81 123/71 125/80 119/73   Pulse: (!) 108 98 97 98   Resp: 18 17 16 16   Temp: 37.2 °C (98.9 °F) 37.1 °C (98.7 °F) 37.4 °C (99.4 °F) 37 °C (98.6 °F)   TempSrc: Temporal Temporal Temporal Temporal   SpO2: 96% 95% 96% 95%   Weight:       Height:         Body mass index is 25.65 kg/m².  /73   Pulse 98   Temp 37 °C (98.6 °F) (Temporal)   Resp 16   Ht 1.575 m (5' 2\")   Wt 63.6 kg (140 lb 3.4 oz)   LMP 05/25/2019 (Exact Date)   SpO2 95%   Breastfeeding? No   BMI 25.65 kg/m²   O2 therapy: Pulse Oximetry: 95 %, O2 (LPM): 0, O2 Delivery: None (Room Air)    Physical Exam   Constitutional: She is oriented to person, place, and time and well-developed, well-nourished, and in no distress.   HENT:   Head: Normocephalic and atraumatic.   Eyes: Pupils are equal, round, and reactive to light. EOM are normal.   Neck: Normal range of motion. Neck supple.   Cardiovascular: Regular rhythm.  Tachycardia present.    Pulmonary/Chest: Effort normal and breath sounds normal. No respiratory distress. She has no wheezes.   Abdominal: Soft. Bowel sounds are normal. " She exhibits no distension. There is tenderness (Right lower quadrant ).   Musculoskeletal: Normal range of motion. She exhibits no edema.   Neurological: She is alert and oriented to person, place, and time. GCS score is 15.   Skin: Skin is warm and dry.   Psychiatric: Affect normal.   Nursing note and vitals reviewed.    Data Review       Old Records Request:   Completed  Current Records review/summary: Completed    Lab Data Review:  Recent Results (from the past 24 hour(s))   POCT Urinalysis    Collection Time: 06/14/19  8:40 AM   Result Value Ref Range    POC Color Angi Negative    POC Appearance Clear Negative    POC Leukocyte Esterase neg Negative    POC Nitrites neg Negative    POC Urobiligen neg Negative (0.2) mg/dL    POC Protein 30 Negative mg/dL    POC Urine PH 5.5 5.0 - 8.0    POC Blood neg Negative    POC Specific Gravity 1.025 <1.005 - >1.030    POC Ketones 160 Negative mg/dL    POC Bilirubin small Negative mg/dL    POC Glucose neg Negative mg/dL   POCT Pregnancy    Collection Time: 06/14/19  8:40 AM   Result Value Ref Range    POC Urine Pregnancy Test Negative Negative    Internal Control Positive Positive     Internal Control Negative Negative    CBC WITH DIFFERENTIAL    Collection Time: 06/14/19  9:23 AM   Result Value Ref Range    WBC 14.1 (H) 4.8 - 10.8 K/uL    RBC 4.38 4.20 - 5.40 M/uL    Hemoglobin 13.7 12.0 - 16.0 g/dL    Hematocrit 40.0 37.0 - 47.0 %    MCV 91.3 81.4 - 97.8 fL    MCH 31.3 27.0 - 33.0 pg    MCHC 34.3 33.6 - 35.0 g/dL    RDW 38.1 35.9 - 50.0 fL    Platelet Count 283 164 - 446 K/uL    MPV 9.9 9.0 - 12.9 fL    Neutrophils-Polys 90.50 (H) 44.00 - 72.00 %    Lymphocytes 4.70 (L) 22.00 - 41.00 %    Monocytes 4.00 0.00 - 13.40 %    Eosinophils 0.10 0.00 - 6.90 %    Basophils 0.30 0.00 - 1.80 %    Immature Granulocytes 0.40 0.00 - 0.90 %    Nucleated RBC 0.00 /100 WBC    Neutrophils (Absolute) 12.76 (H) 2.00 - 7.15 K/uL    Lymphs (Absolute) 0.66 (L) 1.00 - 4.80 K/uL    Monos  (Absolute) 0.56 0.00 - 0.85 K/uL    Eos (Absolute) 0.02 0.00 - 0.51 K/uL    Baso (Absolute) 0.04 0.00 - 0.12 K/uL    Immature Granulocytes (abs) 0.06 0.00 - 0.11 K/uL    NRBC (Absolute) 0.00 K/uL   Comp Metabolic Panel    Collection Time: 06/14/19  9:23 AM   Result Value Ref Range    Sodium 141 135 - 145 mmol/L    Potassium 3.5 (L) 3.6 - 5.5 mmol/L    Chloride 103 96 - 112 mmol/L    Co2 23 20 - 33 mmol/L    Anion Gap 15.0 (H) 0.0 - 11.9    Glucose 103 (H) 65 - 99 mg/dL    Bun 8 8 - 22 mg/dL    Creatinine 0.74 0.50 - 1.40 mg/dL    Calcium 9.3 8.5 - 10.5 mg/dL    AST(SGOT) 43 12 - 45 U/L    ALT(SGPT) 30 2 - 50 U/L    Alkaline Phosphatase 122 (H) 30 - 99 U/L    Total Bilirubin 0.6 0.1 - 1.5 mg/dL    Albumin 4.4 3.2 - 4.9 g/dL    Total Protein 7.8 6.0 - 8.2 g/dL    Globulin 3.4 1.9 - 3.5 g/dL    A-G Ratio 1.3 g/dL   LIPASE    Collection Time: 06/14/19  9:23 AM   Result Value Ref Range    Lipase 7 (L) 11 - 82 U/L   FASTING STATUS    Collection Time: 06/14/19  9:23 AM   Result Value Ref Range    Fasting Status Fasting    ESTIMATED GFR    Collection Time: 06/14/19  9:23 AM   Result Value Ref Range    GFR If African American >60 >60 mL/min/1.73 m 2    GFR If Non African American >60 >60 mL/min/1.73 m 2   Lactic acid (lactate)    Collection Time: 06/14/19 12:31 PM   Result Value Ref Range    Lactic Acid 0.8 0.5 - 2.0 mmol/L   CBC with Differential    Collection Time: 06/15/19  4:19 AM   Result Value Ref Range    WBC 7.5 4.8 - 10.8 K/uL    RBC 3.68 (L) 4.20 - 5.40 M/uL    Hemoglobin 11.5 (L) 12.0 - 16.0 g/dL    Hematocrit 33.9 (L) 37.0 - 47.0 %    MCV 92.1 81.4 - 97.8 fL    MCH 31.3 27.0 - 33.0 pg    MCHC 33.9 33.6 - 35.0 g/dL    RDW 38.9 35.9 - 50.0 fL    Platelet Count 234 164 - 446 K/uL    MPV 9.4 9.0 - 12.9 fL    Neutrophils-Polys 70.50 44.00 - 72.00 %    Lymphocytes 19.40 (L) 22.00 - 41.00 %    Monocytes 7.10 0.00 - 13.40 %    Eosinophils 2.10 0.00 - 6.90 %    Basophils 0.50 0.00 - 1.80 %    Immature Granulocytes 0.40  0.00 - 0.90 %    Nucleated RBC 0.00 /100 WBC    Neutrophils (Absolute) 5.27 2.00 - 7.15 K/uL    Lymphs (Absolute) 1.45 1.00 - 4.80 K/uL    Monos (Absolute) 0.53 0.00 - 0.85 K/uL    Eos (Absolute) 0.16 0.00 - 0.51 K/uL    Baso (Absolute) 0.04 0.00 - 0.12 K/uL    Immature Granulocytes (abs) 0.03 0.00 - 0.11 K/uL    NRBC (Absolute) 0.00 K/uL     Imaging/Procedures Review:    Independant Imaging Review: Completed     CT abdomen and pelvis completed on 6/14/2019 at 10:53 AM  Impression  1.  Rim-enhancing tubular structure surrounding the ill-defined right ovary with significant surrounding inflammatory stranding. Findings are consistent with tubo-ovarian abscess.  2.  Additional rim-enhancing fluid collection in the cul-de-sac is consistent with an abscess.    EKG:   EKG Independent Review: Deferred    Records reviewed and summarized in current documentation :  Yes  [employer] teaching service handout given to patient:  No       Assessment/Plan     Tubo-ovarian abscess   Assessment & Plan    -Patient presented with right lower quadrant abdominal pain associated with fever, chills and nausea for the past 5 days  -CT abdomen showed are suggestive of tubo-ovarian mass, meets sepsis criteria  -Gynecologist to see patient    Plan  -Telemetry monitoring  -IV fluids normal saline 100 mL/h  -IV ceftriaxone 1 g daily  -IV doxycycline 100 mg twice daily  -IV metronidazole 500 mg 3 times daily  -Pain management with IV morphine 2mg Q4H/PRN for severe pain  -lovenox for dvt prophylaxis  -Consult to gynecologist       Anticipated Hospital stay:  >2 midnights    Quality Measures  Quality-Core Measures   Reviewed items::  Labs reviewed, Medications reviewed and Radiology images reviewed  Robison catheter::  No Robison  DVT prophylaxis pharmacological::  Enoxaparin (Lovenox)  Antibiotics:  Treating active infection/contamination beyond 24 hours perioperative coverage    PCP: Janice Edmondson M.D.

## 2019-06-15 ENCOUNTER — APPOINTMENT (OUTPATIENT)
Dept: RADIOLOGY | Facility: MEDICAL CENTER | Age: 29
DRG: 872 | End: 2019-06-15
Attending: OBSTETRICS & GYNECOLOGY
Payer: COMMERCIAL

## 2019-06-15 LAB
ALBUMIN SERPL BCP-MCNC: 3.4 G/DL (ref 3.2–4.9)
ALBUMIN/GLOB SERPL: 1.3 G/DL
ALP SERPL-CCNC: 95 U/L (ref 30–99)
ALT SERPL-CCNC: 34 U/L (ref 2–50)
ANION GAP SERPL CALC-SCNC: 6 MMOL/L (ref 0–11.9)
AST SERPL-CCNC: 40 U/L (ref 12–45)
BASOPHILS # BLD AUTO: 0.5 % (ref 0–1.8)
BASOPHILS # BLD: 0.04 K/UL (ref 0–0.12)
BILIRUB SERPL-MCNC: 0.4 MG/DL (ref 0.1–1.5)
BUN SERPL-MCNC: 5 MG/DL (ref 8–22)
CALCIUM SERPL-MCNC: 8.2 MG/DL (ref 8.5–10.5)
CHLORIDE SERPL-SCNC: 108 MMOL/L (ref 96–112)
CO2 SERPL-SCNC: 22 MMOL/L (ref 20–33)
CREAT SERPL-MCNC: 0.54 MG/DL (ref 0.5–1.4)
EOSINOPHIL # BLD AUTO: 0.16 K/UL (ref 0–0.51)
EOSINOPHIL NFR BLD: 2.1 % (ref 0–6.9)
ERYTHROCYTE [DISTWIDTH] IN BLOOD BY AUTOMATED COUNT: 38.9 FL (ref 35.9–50)
GLOBULIN SER CALC-MCNC: 2.6 G/DL (ref 1.9–3.5)
GLUCOSE SERPL-MCNC: 111 MG/DL (ref 65–99)
HCT VFR BLD AUTO: 33.9 % (ref 37–47)
HGB BLD-MCNC: 11.5 G/DL (ref 12–16)
IMM GRANULOCYTES # BLD AUTO: 0.03 K/UL (ref 0–0.11)
IMM GRANULOCYTES NFR BLD AUTO: 0.4 % (ref 0–0.9)
LYMPHOCYTES # BLD AUTO: 1.45 K/UL (ref 1–4.8)
LYMPHOCYTES NFR BLD: 19.4 % (ref 22–41)
MCH RBC QN AUTO: 31.3 PG (ref 27–33)
MCHC RBC AUTO-ENTMCNC: 33.9 G/DL (ref 33.6–35)
MCV RBC AUTO: 92.1 FL (ref 81.4–97.8)
MONOCYTES # BLD AUTO: 0.53 K/UL (ref 0–0.85)
MONOCYTES NFR BLD AUTO: 7.1 % (ref 0–13.4)
NEUTROPHILS # BLD AUTO: 5.27 K/UL (ref 2–7.15)
NEUTROPHILS NFR BLD: 70.5 % (ref 44–72)
NRBC # BLD AUTO: 0 K/UL
NRBC BLD-RTO: 0 /100 WBC
PLATELET # BLD AUTO: 234 K/UL (ref 164–446)
PMV BLD AUTO: 9.4 FL (ref 9–12.9)
POTASSIUM SERPL-SCNC: 3.9 MMOL/L (ref 3.6–5.5)
PROT SERPL-MCNC: 6 G/DL (ref 6–8.2)
RBC # BLD AUTO: 3.68 M/UL (ref 4.2–5.4)
SODIUM SERPL-SCNC: 136 MMOL/L (ref 135–145)
WBC # BLD AUTO: 7.5 K/UL (ref 4.8–10.8)

## 2019-06-15 PROCEDURE — 700111 HCHG RX REV CODE 636 W/ 250 OVERRIDE (IP): Performed by: STUDENT IN AN ORGANIZED HEALTH CARE EDUCATION/TRAINING PROGRAM

## 2019-06-15 PROCEDURE — 36415 COLL VENOUS BLD VENIPUNCTURE: CPT

## 2019-06-15 PROCEDURE — 770006 HCHG ROOM/CARE - MED/SURG/GYN SEMI*

## 2019-06-15 PROCEDURE — 99233 SBSQ HOSP IP/OBS HIGH 50: CPT | Mod: GC | Performed by: HOSPITALIST

## 2019-06-15 PROCEDURE — 700102 HCHG RX REV CODE 250 W/ 637 OVERRIDE(OP): Performed by: STUDENT IN AN ORGANIZED HEALTH CARE EDUCATION/TRAINING PROGRAM

## 2019-06-15 PROCEDURE — 700102 HCHG RX REV CODE 250 W/ 637 OVERRIDE(OP): Performed by: HOSPITALIST

## 2019-06-15 PROCEDURE — A9270 NON-COVERED ITEM OR SERVICE: HCPCS | Performed by: HOSPITALIST

## 2019-06-15 PROCEDURE — A9270 NON-COVERED ITEM OR SERVICE: HCPCS | Performed by: STUDENT IN AN ORGANIZED HEALTH CARE EDUCATION/TRAINING PROGRAM

## 2019-06-15 PROCEDURE — 76856 US EXAM PELVIC COMPLETE: CPT

## 2019-06-15 PROCEDURE — 80053 COMPREHEN METABOLIC PANEL: CPT

## 2019-06-15 PROCEDURE — 700105 HCHG RX REV CODE 258: Performed by: STUDENT IN AN ORGANIZED HEALTH CARE EDUCATION/TRAINING PROGRAM

## 2019-06-15 PROCEDURE — 85025 COMPLETE CBC W/AUTO DIFF WBC: CPT

## 2019-06-15 RX ORDER — IBUPROFEN 600 MG/1
600 TABLET ORAL EVERY 6 HOURS PRN
Status: DISCONTINUED | OUTPATIENT
Start: 2019-06-15 | End: 2019-06-16 | Stop reason: HOSPADM

## 2019-06-15 RX ORDER — ONDANSETRON 4 MG/1
4 TABLET, ORALLY DISINTEGRATING ORAL EVERY 8 HOURS PRN
Status: DISCONTINUED | OUTPATIENT
Start: 2019-06-15 | End: 2019-06-16 | Stop reason: HOSPADM

## 2019-06-15 RX ORDER — ONDANSETRON 4 MG/1
8 TABLET, ORALLY DISINTEGRATING ORAL EVERY 8 HOURS PRN
Status: DISCONTINUED | OUTPATIENT
Start: 2019-06-15 | End: 2019-06-15

## 2019-06-15 RX ADMIN — ONDANSETRON 4 MG: 4 TABLET, ORALLY DISINTEGRATING ORAL at 08:16

## 2019-06-15 RX ADMIN — DOXYCYCLINE 100 MG: 100 TABLET, FILM COATED ORAL at 06:29

## 2019-06-15 RX ADMIN — METRONIDAZOLE 500 MG: 500 TABLET, FILM COATED ORAL at 14:36

## 2019-06-15 RX ADMIN — IBUPROFEN 600 MG: 600 TABLET ORAL at 08:16

## 2019-06-15 RX ADMIN — CEFTRIAXONE SODIUM 1 G: 1 INJECTION, POWDER, FOR SOLUTION INTRAMUSCULAR; INTRAVENOUS at 06:30

## 2019-06-15 RX ADMIN — DOXYCYCLINE 100 MG: 100 TABLET, FILM COATED ORAL at 17:01

## 2019-06-15 RX ADMIN — ENOXAPARIN SODIUM 40 MG: 100 INJECTION SUBCUTANEOUS at 06:28

## 2019-06-15 RX ADMIN — SODIUM CHLORIDE: 900 INJECTION INTRAVENOUS at 06:30

## 2019-06-15 RX ADMIN — METRONIDAZOLE 500 MG: 500 TABLET, FILM COATED ORAL at 06:29

## 2019-06-15 RX ADMIN — METRONIDAZOLE 500 MG: 500 TABLET, FILM COATED ORAL at 21:21

## 2019-06-15 RX ADMIN — CEFTRIAXONE SODIUM 1 G: 1 INJECTION, POWDER, FOR SOLUTION INTRAMUSCULAR; INTRAVENOUS at 11:49

## 2019-06-15 ASSESSMENT — ENCOUNTER SYMPTOMS
CHILLS: 0
COUGH: 0
DOUBLE VISION: 0
HEARTBURN: 0
DIARRHEA: 0
SHORTNESS OF BREATH: 0
DEPRESSION: 0
SORE THROAT: 0
BACK PAIN: 0
NAUSEA: 1
PALPITATIONS: 0
NERVOUS/ANXIOUS: 0
FEVER: 0
VOMITING: 0
NECK PAIN: 0
HEADACHES: 1
ABDOMINAL PAIN: 1
FOCAL WEAKNESS: 0
BLURRED VISION: 0

## 2019-06-15 NOTE — PROGRESS NOTES
"GYNECOLOGY PROGRESS NOTE (CONSULT NOTE TO FOLLOW)    S:  Patient is sleeping soundly.  Discussed with RN that I will be back in the morning to reassess the patient.  In the meantime, I have ordered a TVUS to evaluate her for TOA/ovarian cyst/pelvic fluid collection.  Continue current triple IV antibiotic treatment.    O: /71   Pulse 98   Temp 37.1 °C (98.7 °F) (Temporal)   Resp 17   Ht 1.575 m (5' 2\")   Wt 63.6 kg (140 lb 3.4 oz)   SpO2 95%     Asleep.    Recent Labs      06/14/19   0923   WBC  14.1*   RBC  4.38   HEMOGLOBIN  13.7   HEMATOCRIT  40.0   MCV  91.3   MCH  31.3   RDW  38.1   PLATELETCT  283   MPV  9.9   NEUTSPOLYS  90.50*   LYMPHOCYTES  4.70*   MONOCYTES  4.00   EOSINOPHILS  0.10   BASOPHILS  0.30     Results for ALEXANDRIA CARVAJAL (MRN [id number]) as of 6/14/2019 20:58   Ref. Range 6/14/2019 09:23 6/14/2019 12:31   Sodium Latest Ref Range: 135 - 145 mmol/L 141    Potassium Latest Ref Range: 3.6 - 5.5 mmol/L 3.5 (L)    Chloride Latest Ref Range: 96 - 112 mmol/L 103    Co2 Latest Ref Range: 20 - 33 mmol/L 23    Anion Gap Latest Ref Range: 0.0 - 11.9  15.0 (H)    Glucose Latest Ref Range: 65 - 99 mg/dL 103 (H)    Bun Latest Ref Range: 8 - 22 mg/dL 8    Creatinine Latest Ref Range: 0.50 - 1.40 mg/dL 0.74    GFR If  Latest Ref Range: >60 mL/min/1.73 m 2 >60    GFR If Non  Latest Ref Range: >60 mL/min/1.73 m 2 >60    Calcium Latest Ref Range: 8.5 - 10.5 mg/dL 9.3    AST(SGOT) Latest Ref Range: 12 - 45 U/L 43    ALT(SGPT) Latest Ref Range: 2 - 50 U/L 30    Alkaline Phosphatase Latest Ref Range: 30 - 99 U/L 122 (H)    Total Bilirubin Latest Ref Range: 0.1 - 1.5 mg/dL 0.6    Albumin Latest Ref Range: 3.2 - 4.9 g/dL 4.4    Total Protein Latest Ref Range: 6.0 - 8.2 g/dL 7.8    Globulin Latest Ref Range: 1.9 - 3.5 g/dL 3.4    A-G Ratio Latest Units: g/dL 1.3    Lipase Latest Ref Range: 11 - 82 U/L 7 (L)    Lactic Acid Latest Ref Range: 0.5 - 2.0 mmol/L  0.8   Fasting " Status Unknown Fasting      6/14/2019 10:30 AM    HISTORY/REASON FOR EXAM:  Right lower quadrant pain, fever, nausea. Patient is status post appendectomy.      TECHNIQUE/EXAM DESCRIPTION:   CT scan of the abdomen and pelvis with contrast.    Contrast-enhanced helical scanning was obtained from the diaphragmatic domes through the pubic symphysis following the bolus administration of nonionic contrast without complication.    100 mL of Omnipaque 350 nonionic contrast was administered without complication.    Low dose optimization technique was utilized for this CT exam including automated exposure control and adjustment of the mA and/or kV according to patient size.    COMPARISON: 4/26/2018    FINDINGS: The visualized lung bases are unremarkable.    CT Abdomen:  The liver, spleen, adrenal glands, pancreas, kidneys, and gallbladder are unremarkable.    There are scattered air-fluid levels. There is inflammatory stranding in the mesentery in the pelvis. No pneumoperitoneum is identified.    CT Pelvis:  The bladder is unremarkable.    The uterus is retroverted. There is marked inflammation surrounding an ill-defined right ovary. A rim-enhancing fluid-filled tubular structure around the ovary is consistent with tubo-ovarian abscess. A 2.6 cm cyst in the left ovary is likely physiologic   in a premenopausal female. A rim-enhancing collection in the cul-de-sac measures approximately 4.3 x 5.1 x 2.7 cm.    No suspicious bony lesions.   Impression       1.  Rim-enhancing tubular structure surrounding the ill-defined right ovary with significant surrounding inflammatory stranding. Findings are consistent with tubo-ovarian abscess.    2.  Additional rim-enhancing fluid collection in the cul-de-sac is consistent with an abscess.

## 2019-06-15 NOTE — CARE PLAN
Problem: Infection  Goal: Will remain free from infection  Outcome: PROGRESSING AS EXPECTED  Monitoring labs, vital signs. IV and PO abx in use.      Problem: Pain Management  Goal: Pain level will decrease to patient's comfort goal  Outcome: PROGRESSING AS EXPECTED  Pt states pain well controlled today. Ibuprofen PRN headaches.

## 2019-06-15 NOTE — PROGRESS NOTES
"HD #2      ADMISSION DIAGNOSIS:    1.  TOA.  2.  PELVIC PAIN.  3.  FEVER.    HPI: 28 yo  sexually active woman who presents to the ER with pelvic pain and fevers/chills since last Sunday.  She denies a past history of vaginal infections, PID, abnormal PAP smears, or prior STIs.  She states that she was feeling well until last Sunday and then she began to develop fevers and abdominal and right lower quadrant pelvic pain.  She was sent to the ER from urgent care.      While in the ER, the patient was noted to have a CT scan with findings concerning for TOA and an elevated WBC count.  She had a fever to a maximum of 102.      TVUS was performed today which confirmed findings consistent with a TOA, pelvic fluid collection/abscess, simple ovarian cysts, and a uterine fibroids.    Reviewed these findings with the patient and she was given ACOG information on PID and fibroids.      Questions answered.    Continue oral abx for 14 days and IV abx x 1 day with planned d/c for tomorrow morning.    PMH: non-contributory.  PSH: C section x 2.  Allergies  - ceclor and hydrocodone.  SH: .  She denies alcohol, tobacco, and drugs of abuse.  Past OB HX: C section x 2.  Past gynecologic history: no STI, PID, or abnormal PAP smears.  No prior history of tubal surgery, ectopic pregnancy, ovarian cysts, or uterine fibroids.    /64   Pulse 90   Temp 36.8 °C (98.3 °F)   Resp 16   Ht 1.575 m (5' 2\")   Wt 66.5 kg (146 lb 9.7 oz)   SpO2 96%     Recent Labs      06/14/19   0923  06/15/19   0419   WBC  14.1*  7.5   RBC  4.38  3.68*   HEMOGLOBIN  13.7  11.5*   HEMATOCRIT  40.0  33.9*   MCV  91.3  92.1   MCH  31.3  31.3   RDW  38.1  38.9   PLATELETCT  283  234   MPV  9.9  9.4   NEUTSPOLYS  90.50*  70.50   LYMPHOCYTES  4.70*  19.40*   MONOCYTES  4.00  7.10   EOSINOPHILS  0.10  2.10   BASOPHILS  0.30  0.50     6/15/2019 9:52 AM    HISTORY/REASON FOR EXAM:  TOA?  Ovarian cyst.  Pelvic fluid collection seen on CT scan from " 6/14/19.    TECHNIQUE/EXAM DESCRIPTION:  Transabdominal and transvaginal pelvic ultrasound.    COMPARISON:   CT yesterday    FINDINGS:  Both transabdominal and transvaginal scanning were performed to optimally visualize the pelvis.    The retroverted uterus measures 5.45 cm x 8.77 cm x 6.15 cm.  The uterine myometrium is within normal limits.    The endometrial echo complex measures 1.23 cm.    Low resistive waveforms are confirmed within the ovaries.  The right ovary measures 1.51 cm x 3.24 cm x 2.09 cm.  The left ovary measures 2.00 cm x 3.77 cm x 2.15 cm.    There is complex fluid in the cul-de-sac which is hypoechoic and has a heterogeneous linear echoes. This measures 58 x 42 x 51 mm.    In the right adnexa there is a tubular structure measuring just over 5 x 2 cm    There is some hyperemia adjacent to the cul-de-sac collection and in the right adnexa   Impression       Complicated cul-de-sac fluid corresponds well to the CT findings and is highly suggestive of a cul-de-sac abscess. Organizing hematoma is in the differential    Right adnexal hyperemia and irregular tubular mass is concerning for pyosalpinx.    No torsion is seen     Results for ALEXANDRIA CARVAJAL (MRN [id number]) as of 6/15/2019 13:50   Ref. Range 6/14/2019 08:40   POC Urine Pregnancy Test Latest Ref Range: Negative  Negative     No GC/CT or genital culture results are available for review.    A/P: 28 yo  sexually active woman with a TOA and pelvic abscess noted in a patient without risk factors.    1.  Continue oral and IV ABX.  2.  Anticipate D/C tomorrow morning.  3.  Follow up with Dr. Barriga in 2 weeks.  4.  Will complete a 14 day course of oral doxycycline and flagyl.  5.  Patient will need a repeat TVUS to check for resolution of the TOA and abscess.

## 2019-06-15 NOTE — SENIOR ADMIT NOTE
29 year old female with lower quadrant abdominal pain, for a duration of 2-3 days, intermittent, 10/10 when it occurs, sharp in quality, does not radiate, relieved with advil, associated with fever, nausea. Initially went to urgent care, was sent to the ED, Tenderness in the lower abdominal area on light palpation. CT imaging here shows right tubo-ovarian abscess with fluid collection. Treated with antibiotics as per gynecology recommendations - Dr.Holly FORD Cheng ( [phone]). No surgical intervention for now.

## 2019-06-15 NOTE — PROGRESS NOTES
Internal Medicine Interval Note  Note Author: Debbie Frank M.D.     Name Concetta Hargrove     1990   Age/Sex 29 y.o. female   MRN [id number]   Code Status  full     After 5PM or if no immediate response to page, please call for cross-coverage  Attending/Team: Dr. Beckett/Aneesh See Patient List for primary contact information  Call [phone] to page    1st Call - Day Intern (R1):   Dr. Frank 2nd Call - Day Sr. Resident (R2/R3):   Dr. Dennison     Reason for interval visit  (Principal Problem)   Abdominal pain  Tubo-ovarian mass    Interval Problem Daily Status Update  (24 hours, problem oriented, brief subjective history, new lab/imaging data pertinent to that problem)   No acute events overnight.  Patient denied any fever or chills.  This morning patient complains of mild headache and some nausea and is requesting for Advil for the pain as Tylenol does not work for her headache.  Abdominal pain has significantly improved.  Patient underwent transvaginal ultrasound pending reports and reevaluation by gynecologist.  For now patient will continue with IV ceftriaxone and other oral antibiotics.    Review of Systems   Constitutional: Negative for chills and fever.   HENT: Negative for congestion and sore throat.    Eyes: Negative for blurred vision and double vision.   Respiratory: Negative for cough and shortness of breath.    Cardiovascular: Negative for chest pain and palpitations.   Gastrointestinal: Positive for abdominal pain (mild improving) and nausea. Negative for diarrhea, heartburn and vomiting.   Genitourinary: Negative for dysuria and frequency.   Musculoskeletal: Negative for back pain, joint pain and neck pain.   Skin: Negative for rash.   Neurological: Positive for headaches. Negative for focal weakness.   Psychiatric/Behavioral: Negative for depression. The patient is not nervous/anxious.      Disposition/Barriers to discharge:   Remain inpatient for IV  antibiotics    Consultants/Specialty  Gynaecologist  PCP: Janice Edmondson M.D.    Quality Measures  Quality-Core Measures   Reviewed items::  Labs reviewed and Medications reviewed  Robison catheter::  No Robison  DVT prophylaxis pharmacological::  Enoxaparin (Lovenox)  Antibiotics:  Treating active infection/contamination beyond 24 hours perioperative coverage    Physical Exam       Vitals:    06/14/19 2311 06/15/19 0347 06/15/19 0733 06/15/19 1027   BP: 125/80 119/73 111/75    Pulse: 97 98 92    Resp: 16 16 18    Temp: 37.4 °C (99.4 °F) 37 °C (98.6 °F) 37.1 °C (98.7 °F)    TempSrc: Temporal Temporal Temporal    SpO2: 96% 95% 99%    Weight:    66.5 kg (146 lb 9.7 oz)   Height:         Body mass index is 26.81 kg/m². Weight: 66.5 kg (146 lb 9.7 oz)  Oxygen Therapy:  Pulse Oximetry: 99 %, O2 (LPM): 0, O2 Delivery: None (Room Air)    Physical Exam   Constitutional: She is oriented to person, place, and time and well-developed, well-nourished, and in no distress.   HENT:   Head: Normocephalic and atraumatic.   Eyes: Pupils are equal, round, and reactive to light. EOM are normal.   Neck: Normal range of motion. Neck supple.   Cardiovascular: Normal rate, regular rhythm and normal heart sounds.    Pulmonary/Chest: Effort normal and breath sounds normal. No respiratory distress. She has no wheezes.   Abdominal: Soft. Bowel sounds are normal. She exhibits no distension. There is tenderness (mild RLQ). There is no rebound and no guarding.   Musculoskeletal: Normal range of motion. She exhibits no edema.   Neurological: She is alert and oriented to person, place, and time. GCS score is 15.   Skin: Skin is warm and dry.   Psychiatric: Mood and affect normal.   Nursing note and vitals reviewed.    Assessment/Plan     Tubo-ovarian abscess   Assessment & Plan    -Patient presented with right lower quadrant abdominal pain associated with fever, chills and nausea for the past 5 days  -CT abdomen showed are suggestive of tubo-ovarian  mass, meets sepsis criteria  -Gynecologist recommended continuing antibiotics  -Transvaginal ultrasound completed, pending results    Plan  -D/C IV fluids  -IV ceftriaxone 2 g daily  -po doxycycline 100 mg twice daily  -po metronidazole 500 mg 3 times daily  -Pain management with ibuprofen/tylenol

## 2019-06-15 NOTE — PROGRESS NOTES
Cardiac monitor summary:  Sinus rhythm 84-98  Sinus tachycardia 102  .16/.08/.36  Discontinued at 1306

## 2019-06-15 NOTE — PROGRESS NOTES
Pt alert. States pain is improved from yesterday.  Abdomen soft, non distended.  Tolerating diet, c/o slight nausea . + bowel sounds, + flatus, LBM PTA.  Saturating >90% on RA.  Pt ambulates independently, demonstrates steady gait.  Updated on plan of care. Safety education provided. Bed locked in low. Call light within reach. Rounding in place.     US to happen sometime this AM per imaging.

## 2019-06-16 ENCOUNTER — PATIENT OUTREACH (OUTPATIENT)
Dept: HEALTH INFORMATION MANAGEMENT | Facility: OTHER | Age: 29
End: 2019-06-16

## 2019-06-16 VITALS
SYSTOLIC BLOOD PRESSURE: 122 MMHG | OXYGEN SATURATION: 96 % | TEMPERATURE: 98.4 F | RESPIRATION RATE: 18 BRPM | DIASTOLIC BLOOD PRESSURE: 75 MMHG | HEIGHT: 62 IN | BODY MASS INDEX: 26.98 KG/M2 | WEIGHT: 146.61 LBS | HEART RATE: 79 BPM

## 2019-06-16 LAB
ANION GAP SERPL CALC-SCNC: 6 MMOL/L (ref 0–11.9)
BASOPHILS # BLD AUTO: 0.7 % (ref 0–1.8)
BASOPHILS # BLD: 0.05 K/UL (ref 0–0.12)
BUN SERPL-MCNC: 5 MG/DL (ref 8–22)
CALCIUM SERPL-MCNC: 8.5 MG/DL (ref 8.5–10.5)
CHLORIDE SERPL-SCNC: 109 MMOL/L (ref 96–112)
CO2 SERPL-SCNC: 24 MMOL/L (ref 20–33)
CREAT SERPL-MCNC: 0.54 MG/DL (ref 0.5–1.4)
EOSINOPHIL # BLD AUTO: 0.39 K/UL (ref 0–0.51)
EOSINOPHIL NFR BLD: 5.2 % (ref 0–6.9)
ERYTHROCYTE [DISTWIDTH] IN BLOOD BY AUTOMATED COUNT: 38.9 FL (ref 35.9–50)
GLUCOSE SERPL-MCNC: 118 MG/DL (ref 65–99)
HCT VFR BLD AUTO: 35.1 % (ref 37–47)
HGB BLD-MCNC: 11.8 G/DL (ref 12–16)
IMM GRANULOCYTES # BLD AUTO: 0.03 K/UL (ref 0–0.11)
IMM GRANULOCYTES NFR BLD AUTO: 0.4 % (ref 0–0.9)
LYMPHOCYTES # BLD AUTO: 1.21 K/UL (ref 1–4.8)
LYMPHOCYTES NFR BLD: 16 % (ref 22–41)
MCH RBC QN AUTO: 31.1 PG (ref 27–33)
MCHC RBC AUTO-ENTMCNC: 33.6 G/DL (ref 33.6–35)
MCV RBC AUTO: 92.4 FL (ref 81.4–97.8)
MONOCYTES # BLD AUTO: 0.48 K/UL (ref 0–0.85)
MONOCYTES NFR BLD AUTO: 6.3 % (ref 0–13.4)
NEUTROPHILS # BLD AUTO: 5.41 K/UL (ref 2–7.15)
NEUTROPHILS NFR BLD: 71.4 % (ref 44–72)
NRBC # BLD AUTO: 0 K/UL
NRBC BLD-RTO: 0 /100 WBC
PLATELET # BLD AUTO: 277 K/UL (ref 164–446)
PMV BLD AUTO: 9.3 FL (ref 9–12.9)
POTASSIUM SERPL-SCNC: 3.6 MMOL/L (ref 3.6–5.5)
RBC # BLD AUTO: 3.8 M/UL (ref 4.2–5.4)
SODIUM SERPL-SCNC: 139 MMOL/L (ref 135–145)
WBC # BLD AUTO: 7.6 K/UL (ref 4.8–10.8)

## 2019-06-16 PROCEDURE — 700102 HCHG RX REV CODE 250 W/ 637 OVERRIDE(OP): Performed by: HOSPITALIST

## 2019-06-16 PROCEDURE — 700111 HCHG RX REV CODE 636 W/ 250 OVERRIDE (IP): Performed by: STUDENT IN AN ORGANIZED HEALTH CARE EDUCATION/TRAINING PROGRAM

## 2019-06-16 PROCEDURE — 80048 BASIC METABOLIC PNL TOTAL CA: CPT

## 2019-06-16 PROCEDURE — 700105 HCHG RX REV CODE 258: Performed by: STUDENT IN AN ORGANIZED HEALTH CARE EDUCATION/TRAINING PROGRAM

## 2019-06-16 PROCEDURE — A9270 NON-COVERED ITEM OR SERVICE: HCPCS | Performed by: HOSPITALIST

## 2019-06-16 PROCEDURE — 36415 COLL VENOUS BLD VENIPUNCTURE: CPT

## 2019-06-16 PROCEDURE — 99232 SBSQ HOSP IP/OBS MODERATE 35: CPT | Mod: GC | Performed by: HOSPITALIST

## 2019-06-16 PROCEDURE — 85025 COMPLETE CBC W/AUTO DIFF WBC: CPT

## 2019-06-16 RX ORDER — ONDANSETRON 4 MG/1
4 TABLET, ORALLY DISINTEGRATING ORAL EVERY 8 HOURS PRN
Qty: 10 TAB | Refills: 0 | Status: SHIPPED | OUTPATIENT
Start: 2019-06-16 | End: 2019-07-23

## 2019-06-16 RX ORDER — IBUPROFEN 600 MG/1
600 TABLET ORAL EVERY 6 HOURS PRN
Qty: 30 TAB | Refills: 3 | Status: SHIPPED | OUTPATIENT
Start: 2019-06-16 | End: 2020-01-31

## 2019-06-16 RX ORDER — DOXYCYCLINE 100 MG/1
100 TABLET ORAL EVERY 12 HOURS
Qty: 28 TAB | Refills: 0 | Status: SHIPPED | OUTPATIENT
Start: 2019-06-16 | End: 2019-06-30

## 2019-06-16 RX ORDER — AMOXICILLIN 250 MG
2 CAPSULE ORAL 2 TIMES DAILY
Qty: 30 TAB | Refills: 0 | Status: SHIPPED | OUTPATIENT
Start: 2019-06-16 | End: 2019-07-23

## 2019-06-16 RX ORDER — METRONIDAZOLE 500 MG/1
500 TABLET ORAL 2 TIMES DAILY
Qty: 28 TAB | Refills: 0 | Status: SHIPPED | OUTPATIENT
Start: 2019-06-16 | End: 2019-06-30

## 2019-06-16 RX ADMIN — ONDANSETRON 4 MG: 4 TABLET, ORALLY DISINTEGRATING ORAL at 05:52

## 2019-06-16 RX ADMIN — METRONIDAZOLE 500 MG: 500 TABLET, FILM COATED ORAL at 05:04

## 2019-06-16 RX ADMIN — CEFTRIAXONE SODIUM 2 G: 2 INJECTION, POWDER, FOR SOLUTION INTRAMUSCULAR; INTRAVENOUS at 05:04

## 2019-06-16 RX ADMIN — DOXYCYCLINE 100 MG: 100 TABLET, FILM COATED ORAL at 05:04

## 2019-06-16 RX ADMIN — ENOXAPARIN SODIUM 40 MG: 100 INJECTION SUBCUTANEOUS at 05:04

## 2019-06-16 ASSESSMENT — ENCOUNTER SYMPTOMS
HEARTBURN: 0
DIARRHEA: 0
COUGH: 0
SHORTNESS OF BREATH: 0
PALPITATIONS: 0
NERVOUS/ANXIOUS: 0
NECK PAIN: 0
FEVER: 0
NAUSEA: 0
DEPRESSION: 0
CHILLS: 0
SORE THROAT: 0
BACK PAIN: 0
HEADACHES: 0
DOUBLE VISION: 0
ABDOMINAL PAIN: 1
VOMITING: 0
FOCAL WEAKNESS: 0
BLURRED VISION: 0

## 2019-06-16 NOTE — DISCHARGE SUMMARY
"DISCHARGE SUMMARY:    DATE OF ADMISSION: 6/14/19.    DATE OF DISCHARGE: 6/16/19.    ADMISSION DIAGNOSIS:    1.  Pelvic pain.  2.  Fever.  3.  TOA.    DISCHARGE DIAGNOSIS:    1.  As above.    Imaging studies: CT scan.  TVUS.    Hospital course: uncomplicated.    HD #3      S: Doing well.  Ready for discharge.  She denies fevers/chills/night sweats/nausea/vomiting.  She denies pelvic or abdominal pain and feels much better.    O:  /72   Pulse 91   Temp 37.2 °C (99 °F) (Temporal)   Resp 16   Ht 1.575 m (5' 2\")   Wt 66.5 kg (146 lb 9.7 oz)   SpO2 94%     A, A, and O x 3 NAD    Abdomen - soft    : deferred.    Recent Labs      06/14/19   0923  06/15/19   0419  06/16/19   0650   WBC  14.1*  7.5  7.6   RBC  4.38  3.68*  3.80*   HEMOGLOBIN  13.7  11.5*  11.8*   HEMATOCRIT  40.0  33.9*  35.1*   MCV  91.3  92.1  92.4   MCH  31.3  31.3  31.1   RDW  38.1  38.9  38.9   PLATELETCT  283  234  277   MPV  9.9  9.4  9.3   NEUTSPOLYS  90.50*  70.50  71.40   LYMPHOCYTES  4.70*  19.40*  16.00*   MONOCYTES  4.00  7.10  6.30   EOSINOPHILS  0.10  2.10  5.20   BASOPHILS  0.30  0.50  0.70     A/P: HD #3.  Admitted with pelvic pain/TOA/fever.    1.  TOA - continue oral antibiotics for 14 days.  2.  Follow up with Dr. Barriga in 3 weeks.  3.  RX for ibuprofen, Zofran ODT, and senna-s sent to her pharmacy.  4.  Patient declines the need for a narcotic.  5.  Questions answered.  "

## 2019-06-16 NOTE — PROGRESS NOTES
Internal Medicine Interval Note  Note Author: Debbie Frank M.D.     Name Concetta Hargrove     1990   Age/Sex 29 y.o. female   MRN [id number]   Code Status  full     After 5PM or if no immediate response to page, please call for cross-coverage  Attending/Team: Dr. Beckett/Aneesh See Patient List for primary contact information  Call [phone] to page    1st Call - Day Intern (R1):   Dr. Frank 2nd Call - Day Sr. Resident (R2/R3):   Dr. Dennison     Reason for interval visit  (Principal Problem)   Abdominal pain  Tubo-ovarian mass    Interval Problem Daily Status Update  (24 hours, problem oriented, brief subjective history, new lab/imaging data pertinent to that problem)   No acute events overnight.  Patient denied any fever or chills.  Minimal abdominal pain worse when ambulating. Patient is medically stable to be discharged on oral antibiotics with outpatient follow up with gynaecologist.    Antibiotics selection was per gynecologist.    Review of Systems   Constitutional: Negative for chills and fever.   HENT: Negative for congestion and sore throat.    Eyes: Negative for blurred vision and double vision.   Respiratory: Negative for cough and shortness of breath.    Cardiovascular: Negative for chest pain and palpitations.   Gastrointestinal: Positive for abdominal pain (mild improving). Negative for diarrhea, heartburn, nausea and vomiting.   Genitourinary: Negative for dysuria and frequency.   Musculoskeletal: Negative for back pain, joint pain and neck pain.   Skin: Negative for rash.   Neurological: Negative for focal weakness and headaches.   Psychiatric/Behavioral: Negative for depression. The patient is not nervous/anxious.      Disposition/Barriers to discharge:   Remain inpatient for IV antibiotics    Consultants/Specialty  Gynaecologist  PCP: Janice Edmondson M.D.    Quality Measures  Quality-Core Measures   Reviewed items::  Labs reviewed and Medications reviewed  Enriqueta  catheter::  No Robison  DVT prophylaxis pharmacological::  Enoxaparin (Lovenox)  Antibiotics:  Treating active infection/contamination beyond 24 hours perioperative coverage    Physical Exam       Vitals:    06/15/19 1920 06/15/19 2310 06/16/19 0325 06/16/19 0847   BP: 136/90 110/76 113/72 122/75   Pulse: 91 70 91 79   Resp: 16 16 16 18   Temp: 37.3 °C (99.1 °F) 37.3 °C (99.2 °F) 37.2 °C (99 °F) 36.9 °C (98.4 °F)   TempSrc: Temporal Temporal Temporal Temporal   SpO2: 99% 98% 94% 96%   Weight:       Height:         Body mass index is 26.81 kg/m². Weight: 66.5 kg (146 lb 9.7 oz)  Oxygen Therapy:  Pulse Oximetry: 96 %, O2 (LPM): 0, O2 Delivery: None (Room Air)    Physical Exam   Constitutional: She is oriented to person, place, and time and well-developed, well-nourished, and in no distress.   HENT:   Head: Normocephalic and atraumatic.   Eyes: Pupils are equal, round, and reactive to light. EOM are normal.   Neck: Normal range of motion. Neck supple.   Cardiovascular: Normal rate, regular rhythm and normal heart sounds.    Pulmonary/Chest: Effort normal and breath sounds normal. No respiratory distress. She has no wheezes.   Abdominal: Soft. Bowel sounds are normal. She exhibits no distension. There is tenderness (mild RLQ and right suprapubic area). There is no rebound and no guarding.   Musculoskeletal: Normal range of motion. She exhibits no edema.   Neurological: She is alert and oriented to person, place, and time. GCS score is 15.   Skin: Skin is warm and dry.   Psychiatric: Mood and affect normal.   Nursing note and vitals reviewed.    Assessment/Plan     Tubo-ovarian abscess   Assessment & Plan    -Patient presented with right lower quadrant abdominal pain associated with fever, chills and nausea for the past 5 days  -CT abdomen showed are suggestive of tubo-ovarian mass, meets sepsis criteria  -Gynecologist recommended continuing antibiotics  -Transvaginal ultrasound completed,consistent with tubo-ovarian  abscess    Plan  -antibiotics per gynaecologist  -Pain management with ibuprofen/tylenol  -F/U with gynecologist as outpatient

## 2019-06-16 NOTE — CARE PLAN
Problem: Safety  Goal: Will remain free from falls  Outcome: PROGRESSING AS EXPECTED  Bed in low locked position, call bell within reach,  socks on    Problem: Pain Management  Goal: Pain level will decrease to patient's comfort goal  Outcome: PROGRESSING AS EXPECTED  Pt currently having no complaints of pain, plan for discharge today

## 2019-06-16 NOTE — DISCHARGE INSTRUCTIONS
Discharge Instructions    Discharged to home by car with relative. Discharged via walking, hospital escort: Refused.  Special equipment needed: Not Applicable    Be sure to schedule a follow-up appointment with your primary care doctor or any specialists as instructed.     Discharge Plan:   Diet Plan: Discussed  Activity Level: Discussed  Confirmed Follow up Appointment: Patient to Call and Schedule Appointment  Confirmed Symptoms Management: Discussed  Medication Reconciliation Updated: Yes  Influenza Vaccine Indication: Patient Refuses    I understand that a diet low in cholesterol, fat, and sodium is recommended for good health. Unless I have been given specific instructions below for another diet, I accept this instruction as my diet prescription.   Other diet: Regular    Special Instructions: None    · Is patient discharged on Warfarin / Coumadin?   No     Depression / Suicide Risk    As you are discharged from this [facility] facility, it is important to learn how to keep safe from harming yourself.    Recognize the warning signs:  · Abrupt changes in personality, positive or negative- including increase in energy   · Giving away possessions  · Change in eating patterns- significant weight changes-  positive or negative  · Change in sleeping patterns- unable to sleep or sleeping all the time   · Unwillingness or inability to communicate  · Depression  · Unusual sadness, discouragement and loneliness  · Talk of wanting to die  · Neglect of personal appearance   · Rebelliousness- reckless behavior  · Withdrawal from people/activities they love  · Confusion- inability to concentrate     If you or a loved one observes any of these behaviors or has concerns about self-harm, here's what you can do:  · Talk about it- your feelings and reasons for harming yourself  · Remove any means that you might use to hurt yourself (examples: pills, rope, extension cords, firearm)  · Get professional help from the community  (Mental Health, Substance Abuse, psychological counseling)  · Do not be alone:Call your Safe Contact- someone whom you trust who will be there for you.  · Call your local CRISIS HOTLINE 213-1498 or 182-420-2225  · Call your local Children's Mobile Crisis Response Team [place] (603) 948-2148 or www.[employer]  · Call the toll free National Suicide Prevention Hotlines   · National Suicide Prevention Lifeline 745-669-RRNZ (6421)  · National Hope Line Network 800-SUICIDE (521-1668)

## 2019-06-16 NOTE — PROGRESS NOTES
Pt discharged to home. Discharge instructions provided to pt. Pt verbalizes understanding. Pt states all questions have been answered. Signed copy in chart. Prescriptions sent to [facility] in [place]. Pt states that all personal belongings are in possession. Pt off unit via ambulation, escorted by family member. Kristi Hector R.N.  .

## 2019-06-19 LAB
BACTERIA BLD CULT: NORMAL
BACTERIA BLD CULT: NORMAL
SIGNIFICANT IND 70042: NORMAL
SIGNIFICANT IND 70042: NORMAL
SITE SITE: NORMAL
SITE SITE: NORMAL
SOURCE SOURCE: NORMAL
SOURCE SOURCE: NORMAL

## 2019-07-16 ENCOUNTER — HOSPITAL ENCOUNTER (OUTPATIENT)
Dept: RADIOLOGY | Facility: MEDICAL CENTER | Age: 29
End: 2019-07-16
Attending: OBSTETRICS & GYNECOLOGY
Payer: COMMERCIAL

## 2019-07-16 DIAGNOSIS — N70.93 TUBO-OVARIAN ABSCESS: ICD-10-CM

## 2019-07-16 PROCEDURE — 700117 HCHG RX CONTRAST REV CODE 255: Performed by: OBSTETRICS & GYNECOLOGY

## 2019-07-16 PROCEDURE — A9585 GADOBUTROL INJECTION: HCPCS | Performed by: OBSTETRICS & GYNECOLOGY

## 2019-07-16 PROCEDURE — 72197 MRI PELVIS W/O & W/DYE: CPT

## 2019-07-16 RX ORDER — GADOBUTROL 604.72 MG/ML
6.5 INJECTION INTRAVENOUS ONCE
Status: COMPLETED | OUTPATIENT
Start: 2019-07-16 | End: 2019-07-16

## 2019-07-16 RX ADMIN — GADOBUTROL 6.5 ML: 604.72 INJECTION INTRAVENOUS at 08:33

## 2019-07-17 ENCOUNTER — HOSPITAL ENCOUNTER (OUTPATIENT)
Dept: LAB | Facility: MEDICAL CENTER | Age: 29
End: 2019-07-17
Attending: OBSTETRICS & GYNECOLOGY
Payer: COMMERCIAL

## 2019-07-17 PROCEDURE — 87591 N.GONORRHOEAE DNA AMP PROB: CPT

## 2019-07-17 PROCEDURE — 87491 CHLMYD TRACH DNA AMP PROBE: CPT

## 2019-07-17 PROCEDURE — 88175 CYTOPATH C/V AUTO FLUID REDO: CPT

## 2019-07-23 ENCOUNTER — OFFICE VISIT (OUTPATIENT)
Dept: MEDICAL GROUP | Facility: PHYSICIAN GROUP | Age: 29
End: 2019-07-23
Payer: COMMERCIAL

## 2019-07-23 VITALS
HEART RATE: 83 BPM | TEMPERATURE: 99.6 F | SYSTOLIC BLOOD PRESSURE: 120 MMHG | BODY MASS INDEX: 26.13 KG/M2 | OXYGEN SATURATION: 97 % | RESPIRATION RATE: 18 BRPM | DIASTOLIC BLOOD PRESSURE: 78 MMHG | WEIGHT: 142 LBS | HEIGHT: 62 IN

## 2019-07-23 DIAGNOSIS — N70.93 TUBO-OVARIAN ABSCESS: ICD-10-CM

## 2019-07-23 PROCEDURE — 99213 OFFICE O/P EST LOW 20 MIN: CPT | Performed by: PHYSICIAN ASSISTANT

## 2019-07-23 ASSESSMENT — PATIENT HEALTH QUESTIONNAIRE - PHQ9: CLINICAL INTERPRETATION OF PHQ2 SCORE: 0

## 2019-07-23 NOTE — LETTER
[facility]  Betsy Dotson P.A.-C.  [address]  [place] [address]  Fax: [phone]   Authorization for Release/Disclosure of   Protected Health Information   Name: ALEXANDRIA HARGROVE : 1990 SSN: [id number]   Address: [facility] [place] [address] Phone:    [phone] (home)    I authorize the entity listed below to release/disclose the PHI below to:   [facility]/Betsy Dotson P.A.-C. and Betsy Dotson P.A.-C.   Provider or Entity Name:  [place]   Address   City, State, [facility]   Phone:      Fax:     Reason for request: continuity of care   Information to be released:    [  ] LAST COLONOSCOPY,  including any PATH REPORT and follow-up  [  ] LAST FIT/COLOGUARD RESULT [  ] LAST DEXA  [  ] LAST MAMMOGRAM  [  ] LAST PAP  [  ] LAST LABS [  ] RETINA EXAM REPORT  [  ] IMMUNIZATION RECORDS  [  ] Release all info      [  ] Check here and initial the line next to each item to release ALL health information INCLUDING  _____ Care and treatment for drug and / or alcohol abuse  _____ HIV testing, infection status, or AIDS  _____ Genetic Testing    DATES OF SERVICE OR TIME PERIOD TO BE DISCLOSED: _____________  I understand and acknowledge that:  * This Authorization may be revoked at any time by you in writing, except if your health information has already been used or disclosed.  * Your health information that will be used or disclosed as a result of you signing this authorization could be re-disclosed by the recipient. If this occurs, your re-disclosed health information may no longer be protected by State or Federal laws.  * You may refuse to sign this Authorization. Your refusal will not affect your ability to obtain treatment.  * This Authorization becomes effective upon signing and will  on (date) __________.      If no date is indicated, this Authorization will  one (1) year from the signature date.    Name: Alexandria Hargrove    Signature:   Date:     7/23/2019       PLEASE FAX REQUESTED RECORDS BACK TO: [phone]

## 2019-07-23 NOTE — LETTER
[facility]  Betsy Dotson P.A.-C.  [address]  [place] [address]  Fax: [phone]   Authorization for Release/Disclosure of   Protected Health Information   Name: ALEXANDRIA HARGROVE : 1990 SSN: [id number]   Address: [facility] [place] [address] Phone:    [phone] (home)    I authorize the entity listed below to release/disclose the PHI below to:   [facility]/Betsy Dotson P.A.-C. and Betsy Dotson P.A.-C.   Provider or Entity Name:  [facility]    Address   City, State, [facility]   Phone:      Fax:     Reason for request: continuity of care   Information to be released:    [  ] LAST COLONOSCOPY,  including any PATH REPORT and follow-up  [  ] LAST FIT/COLOGUARD RESULT [  ] LAST DEXA  [  ] LAST MAMMOGRAM  [  ] LAST PAP  [  ] LAST LABS [  ] RETINA EXAM REPORT  [  ] IMMUNIZATION RECORDS  [  ] Release all info      [  ] Check here and initial the line next to each item to release ALL health information INCLUDING  _____ Care and treatment for drug and / or alcohol abuse  _____ HIV testing, infection status, or AIDS  _____ Genetic Testing    DATES OF SERVICE OR TIME PERIOD TO BE DISCLOSED: _____________  I understand and acknowledge that:  * This Authorization may be revoked at any time by you in writing, except if your health information has already been used or disclosed.  * Your health information that will be used or disclosed as a result of you signing this authorization could be re-disclosed by the recipient. If this occurs, your re-disclosed health information may no longer be protected by State or Federal laws.  * You may refuse to sign this Authorization. Your refusal will not affect your ability to obtain treatment.  * This Authorization becomes effective upon signing and will  on (date) __________.      If no date is indicated, this Authorization will  one (1) year from the signature date.    Name: Alexandria Hargrove    Signature:   Date:          7/23/2019       PLEASE FAX REQUESTED RECORDS BACK TO: [phone]

## 2019-07-31 NOTE — PROGRESS NOTES
Assessed patient, vital signs stable, patient will call for pain medication.   Helical Rim Advancement Flap Text: The defect edges were debeveled with a #15 blade scalpel.  Given the location of the defect and the proximity to free margins (helical rim) a double helical rim advancement flap was deemed most appropriate.  Using a sterile surgical marker, the appropriate advancement flaps were drawn incorporating the defect and placing the expected incisions between the helical rim and antihelix where possible.  The area thus outlined was incised through and through with a #15 scalpel blade.  With a skin hook and iris scissors, the flaps were gently and sharply undermined and freed up.

## 2019-08-05 NOTE — PROGRESS NOTES
Chief Complaint   Patient presents with   • Establish Care     [facility]       HISTORY OF PRESENT ILLNESS: Concetta Hargrove is an established 29 y.o. female here to discuss the evaluation and management of:    Patient is a pleasant 29-year-old female here today to establish care.  She tells me she was hospitalized from 6/14/2019-6/16/19 for uncomplicated right tubo-ovarian abscess.  Per chart review patient was prescribed antibiotics per gynecologist and pain was managed with ibuprofen/Tylenol.  She was discharged home.  She tells me that she is been following up with her gynecologist regularly.  Repeat MRI of pelvis and abdomen indicated resolution.  She tells me overall she is feeling well.  No further work-up at this time indicated.        Patient Active Problem List    Diagnosis Date Noted   • Tubo-ovarian abscess 06/14/2019       Allergies:Ceclor [cefaclor] and Hydrocodone    Current Outpatient Medications   Medication Sig Dispense Refill   • cetirizine-psuedoephedrine (ZYRTEC-D ALLERGY & CONGESTION) 5-120 MG per tablet Take 1 Tab by mouth 1 time daily as needed for Allergies.     • ibuprofen (MOTRIN) 600 MG Tab Take 1 Tab by mouth every 6 hours as needed for Moderate Pain. 30 Tab 3     No current facility-administered medications for this visit.        Social History     Tobacco Use   • Smoking status: Never Smoker   • Smokeless tobacco: Never Used   Substance Use Topics   • Alcohol use: No     Comment: occasional   • Drug use: No       Family Status   Relation Name Status   • Mo  Alive   • Fa  Alive   • MGMo  Alive   • Sis  Alive   • Fletcher  Alive   • Fletcher  Alive     Family History   Problem Relation Age of Onset   • Hypertension Mother    • Breast Cancer Mother 33   • Cancer Mother 33        Uterine. Full hysterectomy 35.   • Hypertension Maternal Grandmother    • Breast Cancer Maternal Grandmother    • Cancer Maternal Grandmother         Uterine CA. Full hysterectomy.    • Other Sister   "       Ovarian Cyst   • No Known Problems Daughter    • No Known Problems Daughter        ROS:  Review of Systems   Constitutional: Negative for fever, chills, weight loss and malaise/fatigue.   HENT: Negative for ear pain, nosebleeds, congestion, sore throat and neck pain.    Eyes: Negative for blurred vision.   Respiratory: Negative for cough, sputum production, shortness of breath and wheezing.    Cardiovascular: Negative for chest pain, palpitations, orthopnea and leg swelling.   Gastrointestinal: Negative for heartburn, nausea, vomiting and abdominal pain.   Genitourinary: Negative for dysuria, urgency and frequency.   Musculoskeletal: Negative for myalgias, back pain and joint pain.   Skin: Negative for rash and itching.   Neurological: Negative for dizziness, tingling, tremors, sensory change, focal weakness and headaches.   Endo/Heme/Allergies: Does not bruise/bleed easily.   Psychiatric/Behavioral: Negative for depression, suicidal ideas and memory loss.  The patient is not nervous/anxious and does not have insomnia.    All other systems reviewed and are negative except as in HPI.    Exam: /78 (BP Location: Left arm, Patient Position: Sitting, BP Cuff Size: Adult)   Pulse 83   Temp 37.6 °C (99.6 °F) (*RETIRED* Temporal)   Resp 18   Ht 1.575 m (5' 2\")   Wt 64.4 kg (142 lb)   SpO2 97%  Body mass index is 25.97 kg/m².  General: Normal appearing. No distress.  HEENT: Normocephalic. Eyes conjunctiva clear lids without ptosis, pupils equal and reactive to light accommodation, ears normal shape and contour, canals are clear bilaterally, tympanic membranes are benign, nasal mucosa benign, oropharynx is without erythema, edema or exudates.   Neck: Supple without JVD or bruit. Thyroid is not enlarged.  Pulmonary: Clear to ausculation.  Normal effort. No rales, ronchi, or wheezing.  Cardiovascular: Regular rate and rhythm without murmur.   Abdomen: Soft, nontender, nondistended. Normal bowel sounds. Liver " and spleen are not palpable  Neurologic: Grossly nonfocal.  Cranial nerves are normal.   Lymph: No cervical, supraclavicular or axillary lymph nodes are palpable  Skin: Warm and dry.  No rashes or suspicious skin lesions.  Musculoskeletal: Normal gait. No extremity cyanosis, clubbing, or edema.  Psych: Normal mood and affect. Alert and oriented x3. Judgment and insight is normal.    Medical decision-making and discussion:  1. Tubo-ovarian abscess  Resolved.  Patient is following up with her gynecologist regular.  No further work-up indicated at this time.    Follow-up for worsening symptoms,lack of expected recovery, or should new symptoms or problems arise.      Please note that this dictation was created using voice recognition software. I have made every reasonable attempt to correct obvious errors, but I expect that there are errors of grammar and possibly content that I did not discover before finalizing the note.    Assessment/Plan:  1. Tubo-ovarian abscess         Return if symptoms worsen or fail to improve.

## 2019-08-13 ENCOUNTER — NON-PROVIDER VISIT (OUTPATIENT)
Dept: URGENT CARE | Facility: PHYSICIAN GROUP | Age: 29
End: 2019-08-13
Payer: COMMERCIAL

## 2019-08-13 DIAGNOSIS — Z02.1 PRE-EMPLOYMENT DRUG SCREENING: ICD-10-CM

## 2019-08-13 LAB
AMP AMPHETAMINE: NORMAL
COC COCAINE: NORMAL
INT CON NEG: NORMAL
INT CON POS: NORMAL
MET METHAMPHETAMINES: NORMAL
OPI OPIATES: NORMAL
PCP PHENCYCLIDINE: NORMAL
POC DRUG COMMENT 753798-OCCUPATIONAL HEALTH: NEGATIVE
THC: NORMAL

## 2019-08-13 PROCEDURE — 80305 DRUG TEST PRSMV DIR OPT OBS: CPT | Performed by: FAMILY MEDICINE

## 2019-11-19 ENCOUNTER — HOSPITAL ENCOUNTER (OUTPATIENT)
Facility: MEDICAL CENTER | Age: 29
End: 2019-11-19
Attending: PHYSICIAN ASSISTANT
Payer: COMMERCIAL

## 2019-11-19 ENCOUNTER — OFFICE VISIT (OUTPATIENT)
Dept: MEDICAL GROUP | Facility: PHYSICIAN GROUP | Age: 29
End: 2019-11-19
Payer: COMMERCIAL

## 2019-11-19 VITALS
SYSTOLIC BLOOD PRESSURE: 126 MMHG | TEMPERATURE: 98.5 F | HEART RATE: 92 BPM | BODY MASS INDEX: 25.54 KG/M2 | DIASTOLIC BLOOD PRESSURE: 78 MMHG | OXYGEN SATURATION: 99 % | HEIGHT: 62 IN | RESPIRATION RATE: 20 BRPM | WEIGHT: 138.8 LBS

## 2019-11-19 DIAGNOSIS — N89.8 FOUL SMELLING VAGINAL DISCHARGE: ICD-10-CM

## 2019-11-19 DIAGNOSIS — N89.8 VAGINAL DISCHARGE: ICD-10-CM

## 2019-11-19 DIAGNOSIS — Z23 NEED FOR VACCINATION: ICD-10-CM

## 2019-11-19 DIAGNOSIS — R39.15 URGENCY OF URINATION: ICD-10-CM

## 2019-11-19 DIAGNOSIS — R10.31 RIGHT LOWER QUADRANT PAIN: ICD-10-CM

## 2019-11-19 LAB
APPEARANCE UR: CLEAR
BILIRUB UR STRIP-MCNC: NORMAL MG/DL
COLOR UR AUTO: YELLOW
GLUCOSE UR STRIP.AUTO-MCNC: NORMAL MG/DL
KETONES UR STRIP.AUTO-MCNC: NORMAL MG/DL
LEUKOCYTE ESTERASE UR QL STRIP.AUTO: NORMAL
NITRITE UR QL STRIP.AUTO: NORMAL
PH UR STRIP.AUTO: 6 [PH] (ref 5–8)
PROT UR QL STRIP: NORMAL MG/DL
RBC UR QL AUTO: NORMAL
SP GR UR STRIP.AUTO: 1.01
UROBILINOGEN UR STRIP-MCNC: 0.2 MG/DL

## 2019-11-19 PROCEDURE — 87086 URINE CULTURE/COLONY COUNT: CPT

## 2019-11-19 PROCEDURE — 87591 N.GONORRHOEAE DNA AMP PROB: CPT

## 2019-11-19 PROCEDURE — 87480 CANDIDA DNA DIR PROBE: CPT

## 2019-11-19 PROCEDURE — 99214 OFFICE O/P EST MOD 30 MIN: CPT | Mod: 25 | Performed by: PHYSICIAN ASSISTANT

## 2019-11-19 PROCEDURE — 81002 URINALYSIS NONAUTO W/O SCOPE: CPT | Performed by: PHYSICIAN ASSISTANT

## 2019-11-19 PROCEDURE — 87660 TRICHOMONAS VAGIN DIR PROBE: CPT

## 2019-11-19 PROCEDURE — 99000 SPECIMEN HANDLING OFFICE-LAB: CPT | Performed by: PHYSICIAN ASSISTANT

## 2019-11-19 PROCEDURE — 90686 IIV4 VACC NO PRSV 0.5 ML IM: CPT | Performed by: PHYSICIAN ASSISTANT

## 2019-11-19 PROCEDURE — 87491 CHLMYD TRACH DNA AMP PROBE: CPT

## 2019-11-19 PROCEDURE — 90471 IMMUNIZATION ADMIN: CPT | Performed by: PHYSICIAN ASSISTANT

## 2019-11-19 PROCEDURE — 87510 GARDNER VAG DNA DIR PROBE: CPT

## 2019-11-19 NOTE — PROGRESS NOTES
Chief Complaint   Patient presents with   • Vaginal Discharge     itching x 1 week,rlq pain when bending over        HISTORY OF PRESENT ILLNESS: Concetta Hargrove is an established 29 y.o. female here to discuss the evaluation and management of:    Patient is a pleasant 29-year-old female here today to discuss abnormal vaginal discharge, vaginal pruritus, right lower quadrant pain, and urgency.  She tells me one week ago she developed abnormal vaginal discharge that is foul-smelling.  States discharge can be yellow, green, or white.  States discharge is not thick.  She admits to intermittent episodes of vaginal pruritus.  States she is also been feeling fatigued.  She tells me for the past 1 week she has been experiencing intermittent episodes of urinary urgency.  Denies hematuria or dysuria.  She tells me that she is in a monogamous relationship with her .  Denies STIs.  States yesterday she developed to intermittent episodes of right lower quadrant pain that lasted 10 seconds in duration.  States pain was a sharp nonradiating pain.  She denies fever, chills, nausea, vomiting, pelvic pain, flank pain, headache, myalgias, joint pain, night sweats, weight loss.  Denies blisters/warts/lesions.  Patient has a positive medical history for tubo-ovarian abscess.  He mentions last menstrual period was 10/30/2019- 11/5/2019.  Patient denies being pregnant.  Patient's  had a vasectomy.      Patient Active Problem List    Diagnosis Date Noted   • Tubo-ovarian abscess 06/14/2019     Priority: High       Allergies:Ceclor [cefaclor] and Hydrocodone    Current Outpatient Medications   Medication Sig Dispense Refill   • ibuprofen (MOTRIN) 600 MG Tab Take 1 Tab by mouth every 6 hours as needed for Moderate Pain. 30 Tab 3   • cetirizine-psuedoephedrine (ZYRTEC-D ALLERGY & CONGESTION) 5-120 MG per tablet Take 1 Tab by mouth 1 time daily as needed for Allergies.       No current facility-administered medications for  this visit.        Social History     Tobacco Use   • Smoking status: Never Smoker   • Smokeless tobacco: Never Used   Substance Use Topics   • Alcohol use: No     Comment: occasional   • Drug use: No       Family Status   Relation Name Status   • Mo  Alive   • Fa  Alive   • MGMo  Alive   • Sis  Alive   • Fletcher  Alive   • Fletcher  Alive     Family History   Problem Relation Age of Onset   • Hypertension Mother    • Breast Cancer Mother 33   • Cancer Mother 33        Uterine. Full hysterectomy 35.   • Hypertension Maternal Grandmother    • Breast Cancer Maternal Grandmother    • Cancer Maternal Grandmother         Uterine CA. Full hysterectomy.    • Other Sister         Ovarian Cyst   • No Known Problems Daughter    • No Known Problems Daughter        ROS:  Review of Systems   Constitutional: Negative for fever, chills, weight loss and malaise/fatigue.   HENT: Negative for ear pain, nosebleeds, congestion, sore throat and neck pain.    Eyes: Negative for blurred vision.   Respiratory: Negative for cough, sputum production, shortness of breath and wheezing.    Cardiovascular: Negative for chest pain, palpitations, orthopnea and leg swelling.   Gastrointestinal: Negative for heartburn, nausea, vomiting and abdominal pain.   Genitourinary: Negative for dysuria and frequency.  Positive for urgency.  Positive for foul-smelling abnormal vaginal discharge.  Positive for vaginal pruritus.  Musculoskeletal: Negative for myalgias, back pain and joint pain.   Skin: Negative for rash and itching.   Neurological: Negative for dizziness, tingling, tremors, sensory change, focal weakness and headaches.   Endo/Heme/Allergies: Does not bruise/bleed easily.   Psychiatric/Behavioral: Negative for depression, suicidal ideas and memory loss.  The patient is not nervous/anxious and does not have insomnia.    All other systems reviewed and are negative except as in HPI.    Exam: /78 (BP Location: Left arm, Patient Position: Sitting)    "Pulse 92   Temp 36.9 °C (98.5 °F) (Temporal)   Resp 20   Ht 1.575 m (5' 2\")   Wt 63 kg (138 lb 12.8 oz)   SpO2 99%  Body mass index is 25.39 kg/m².  General: Normal appearing. No distress.  HEENT: Normocephalic. Eyes conjunctiva clear lids without ptosis, ears normal shape and contour.  Neck: Supple without JVD. Thyroid is not enlarged.  Pulmonary: Clear to ausculation.  Normal effort. No rales, ronchi, or wheezing.  Cardiovascular: Regular rate and rhythm without murmur.  Abdomen: Soft, nontender, nondistended. Normal bowel sounds. Liver and spleen are not palpable.  No CVA tenderness.  Neurologic: Grossly nonfocal.  Cranial nerves are normal.  Lymph: No cervical, supraclavicular or axillary lymph nodes are palpable  Skin: Warm and dry.  No rashes or suspicious skin lesions.  Musculoskeletal: Normal gait. No extremity cyanosis, clubbing, or edema.  Psych: Normal mood and affect. Alert and oriented x3. Judgment and insight is normal.    Medical decision-making and discussion:  1. Foul smelling vaginal discharge  Lab work has been ordered to further evaluate patient.  Patient will be contacted with results.  Continue practicing good feminine hygiene.  Discussed ED precautions.  - VAGINAL PATHOGENS DNA PANEL; Future  - Chlamydia/GC PCR Urine Or Swab; Future    2. Vaginal discharge  Same as # 2.    - VAGINAL PATHOGENS DNA PANEL; Future  - Chlamydia/GC PCR Urine Or Swab; Future    3. Urgency of urination  Lab Results   Component Value Date/Time    POCCOLOR yellow 11/19/2019 10:48 AM    POCAPPEAR clear 11/19/2019 10:48 AM    POCLEUKEST neg 11/19/2019 10:48 AM    POCNITRITE neg 11/19/2019 10:48 AM    POCUROBILIGE 0.2 11/19/2019 10:48 AM    POCPROTEIN neg 11/19/2019 10:48 AM    POCURPH 6.0 11/19/2019 10:48 AM    POCBLOOD neg 11/19/2019 10:48 AM    POCSPGRV 1.010 11/19/2019 10:48 AM    POCKETONES neg 11/19/2019 10:48 AM    POCBILIRUBIN neg 11/19/2019 10:48 AM    POCGLUCUA neg 11/19/2019 10:48 AM     POCT urinalysis did " not indicate signs of infection.  Urine culture has been ordered.  Additional lab work has been ordered.  Patient will be contacted with results.  Advised patient to drink plenty of water.  Wipe front to back.  Void before and after intercourse.  Do not hold the restroom.    Follow-up for worsening symptoms,lack of expected recovery, or should new symptoms or problems arise.      - POCT Urinalysis  - VAGINAL PATHOGENS DNA PANEL; Future  - Chlamydia/GC PCR Urine Or Swab; Future  - URINE CULTURE    4. Right lower quadrant pain  Patient has a history of right tubo-ovarian abscess.  Patient is complaining of right lower quadrant pain that developed yesterday.  She is experienced 2 episodes of sharp nonradiating lower quadrant pain.  Transvaginal ultrasound has been ordered to further evaluate patient.  Patient be contacted with results.  Discussed ED precautions.    - US-PELVIC COMPLETE (TRANSABDOMINAL/TRANSVAGINAL) (COMBO); Future    5. Need for vaccination  A flu vaccination was administered to patient without complication. A VIS form was provided to patient.     - Influenza Vaccine Quad Injection (PF)      Please note that this dictation was created using voice recognition software. I have made every reasonable attempt to correct obvious errors, but I expect that there are errors of grammar and possibly content that I did not discover before finalizing the note.    Assessment/Plan:  1. Abnormal urogenital discharge  VAGINAL PATHOGENS DNA PANEL    Chlamydia/GC PCR Urine Or Swab   2. Urgency of urination  POCT Urinalysis    VAGINAL PATHOGENS DNA PANEL    Chlamydia/GC PCR Urine Or Swab    URINE CULTURE   3. Right lower quadrant pain  US-PELVIC COMPLETE (TRANSABDOMINAL/TRANSVAGINAL) (COMBO)   4. Need for vaccination  Influenza Vaccine Quad Injection (PF)       No follow-ups on file.

## 2019-11-20 DIAGNOSIS — B37.31 VAGINAL YEAST INFECTION: ICD-10-CM

## 2019-11-20 DIAGNOSIS — N76.0 BACTERIAL VAGINOSIS: ICD-10-CM

## 2019-11-20 DIAGNOSIS — B96.89 BACTERIAL VAGINOSIS: ICD-10-CM

## 2019-11-20 LAB
C TRACH DNA SPEC QL NAA+PROBE: NEGATIVE
CANDIDA DNA VAG QL PROBE+SIG AMP: POSITIVE
G VAGINALIS DNA VAG QL PROBE+SIG AMP: POSITIVE
N GONORRHOEA DNA SPEC QL NAA+PROBE: NEGATIVE
SPECIMEN SOURCE: NORMAL
T VAGINALIS DNA VAG QL PROBE+SIG AMP: NEGATIVE

## 2019-11-20 RX ORDER — FLUCONAZOLE 150 MG/1
TABLET ORAL
Qty: 2 TAB | Refills: 0 | Status: SHIPPED
Start: 2019-11-20 | End: 2020-01-09

## 2019-11-20 RX ORDER — METRONIDAZOLE 500 MG/1
TABLET ORAL
Qty: 14 TAB | Refills: 0 | Status: SHIPPED
Start: 2019-11-20 | End: 2020-01-09

## 2019-11-21 ENCOUNTER — PATIENT MESSAGE (OUTPATIENT)
Dept: MEDICAL GROUP | Facility: PHYSICIAN GROUP | Age: 29
End: 2019-11-21

## 2019-11-22 LAB
BACTERIA UR CULT: NORMAL
SIGNIFICANT IND 70042: NORMAL
SITE SITE: NORMAL
SOURCE SOURCE: NORMAL

## 2019-11-22 NOTE — TELEPHONE ENCOUNTER
Phone Number Called: [phone] (home)     Call outcome: left message for patient to call back regarding message below    Message: left msg to cb or check mychart.

## 2020-01-09 ENCOUNTER — OFFICE VISIT (OUTPATIENT)
Dept: MEDICAL GROUP | Facility: PHYSICIAN GROUP | Age: 30
End: 2020-01-09
Payer: COMMERCIAL

## 2020-01-09 VITALS
TEMPERATURE: 98.8 F | WEIGHT: 139.11 LBS | HEART RATE: 82 BPM | SYSTOLIC BLOOD PRESSURE: 130 MMHG | OXYGEN SATURATION: 98 % | BODY MASS INDEX: 25.6 KG/M2 | HEIGHT: 62 IN | DIASTOLIC BLOOD PRESSURE: 80 MMHG

## 2020-01-09 DIAGNOSIS — B34.9 VIRAL ILLNESS: ICD-10-CM

## 2020-01-09 LAB
FLUAV+FLUBV AG SPEC QL IA: NORMAL
INT CON NEG: NEGATIVE
INT CON POS: POSITIVE

## 2020-01-09 PROCEDURE — 87804 INFLUENZA ASSAY W/OPTIC: CPT | Performed by: FAMILY MEDICINE

## 2020-01-09 PROCEDURE — 99213 OFFICE O/P EST LOW 20 MIN: CPT | Performed by: FAMILY MEDICINE

## 2020-01-09 ASSESSMENT — PATIENT HEALTH QUESTIONNAIRE - PHQ9: CLINICAL INTERPRETATION OF PHQ2 SCORE: 0

## 2020-01-09 NOTE — LETTER
January 9, 2020         Patient: Concetta Hargrove   YOB: 1990   Date of Visit: 1/9/2020           To Whom it May Concern:    Concetta Hargrove was seen in my clinic on 1/9/2020. She is excused from work on 1/9-10/2020 due to illness.    If you have any questions or concerns, please don't hesitate to call.        Sincerely,           Lauren Bray M.D.  Electronically Signed

## 2020-01-09 NOTE — PROGRESS NOTES
"Subjective:      Concetta Hargrove is a 29 y.o. female who presents with Other (chest pressure/congestion)    HPI:    The patient is here today with acute complaints of chest congestion. She is currently an established patient of [facility]      She presents today with complaints of chest congestion acute onset yesterday. She reports a cough which also started yesterday with very minimal phlegm whitish in Her worst symptom today has been chest discomfort and exhaustion.  She believes the chest discomfort is related to her cough. She states she feels tired after movement and while talking or walking; she feels improved after resting. Her symptoms are intermittent and only ongoing for short periods of time. At rest, she is asymptomatic. She states that prior to her cough, she believes she had some right ear pain with nasal congestion for the past few weeks. She has muscle aches, generalized malaise, and sore throat that started yesterday. She has been treating her symptoms with Tylenol and Sudafed. She denies any fevers, chills, rhinorrhea, PND, diarrhea, or vomiting. She did receive her flu vaccination this season, but she notes there was a positive flu case at her work 2 days ago who she was exposed to. She adds that her house is currently being remodeled, and there has been a lot of dust and powders in her house recently.  She is wondering if this may be causing her symptoms.      Past medical history, past surgical history, family history reviewed-no changes    Social history reviewed-no changes    Allergies reviewed-no changes    Medications reviewed-no changes       ROS:  As per the HPI as shown above, the rest are negative.       Objective:     /80 (BP Location: Left arm, Patient Position: Sitting, BP Cuff Size: Adult)   Pulse 82   Temp 37.1 °C (98.8 °F) (Temporal)   Ht 1.575 m (5' 2\")   Wt 63.1 kg (139 lb 1.8 oz)   SpO2 98%   BMI 25.44 kg/m²     Physical Exam    Examined alert, " awake, oriented, not in distress    Ears-bilateral TM intact without signs of infection  Nose-turbinates normal without swelling, discharge, or obstruction   Throat-tonsils are normal without enlargement, exudate, or erythema  Neck-supple, no lymphadenopathy, no thyromegaly  Lungs-clear to auscultation, no rales, no wheezes  Heart-regular rate and rhythm, no murmur  Extremities-no edema, clubbing, cyanosis    Labs:  Results for orders placed or performed in visit on 01/09/20   POCT Influenza A/B   Result Value Ref Range    Rapid Influenza A-B neg     Internal Control Positive Positive     Internal Control Negative Negative          Assessment/Plan:     1. Viral illness  Patient's flu testing was negative. Patient's symptoms, duration, and physical exam are consistent with a viral illness. I advised supportive care at this point. She is to stay well hydrated, rest, and further treat her symptoms with OTC medications such as Tylenol/NSAIDs. I advised that this could be the beginning of a bacterial infection, so she should contact us if her symptoms do not resolve after the weekend specially if she starts bringing up colored phlegm. We discussed that this is likely not secondary to the dust exposure at her home as she would be having a more severe cough and others in her house would be symptomatic as well. Patient was provided with a work note for today and tomorrow.   - POCT Influenza A/B       Jabari GILBERT (Scrfloriane), am scribing for, and in the presence of, Lauren Bray MD     Electronically signed by: Jabari Gallagher (Melodyibe), 1/9/2020    ILauren MD personally performed the services described in this documentation, as scribed by Jabari Gallagher in my presence, and it is both accurate and complete.

## 2020-01-29 ENCOUNTER — OFFICE VISIT (OUTPATIENT)
Dept: URGENT CARE | Facility: PHYSICIAN GROUP | Age: 30
End: 2020-01-29
Payer: COMMERCIAL

## 2020-01-29 VITALS
HEIGHT: 62 IN | BODY MASS INDEX: 25.95 KG/M2 | SYSTOLIC BLOOD PRESSURE: 128 MMHG | HEART RATE: 104 BPM | TEMPERATURE: 100.3 F | RESPIRATION RATE: 14 BRPM | WEIGHT: 141 LBS | OXYGEN SATURATION: 100 % | DIASTOLIC BLOOD PRESSURE: 84 MMHG

## 2020-01-29 DIAGNOSIS — R21 MORBILLIFORM RASH: ICD-10-CM

## 2020-01-29 DIAGNOSIS — B37.2 CANDIDAL INTERTRIGO: ICD-10-CM

## 2020-01-29 PROCEDURE — 99214 OFFICE O/P EST MOD 30 MIN: CPT | Performed by: PHYSICIAN ASSISTANT

## 2020-01-29 RX ORDER — KETOCONAZOLE 20 MG/G
CREAM TOPICAL
Qty: 1 TUBE | Refills: 0 | Status: SHIPPED | OUTPATIENT
Start: 2020-01-29 | End: 2020-03-17 | Stop reason: CLARIF

## 2020-01-29 RX ORDER — FLUCONAZOLE 200 MG/1
200 TABLET ORAL
Qty: 4 TAB | Refills: 0 | Status: SHIPPED | OUTPATIENT
Start: 2020-01-29 | End: 2020-01-31

## 2020-01-29 ASSESSMENT — ENCOUNTER SYMPTOMS
COUGH: 0
SHORTNESS OF BREATH: 0
FEVER: 0
MYALGIAS: 0
SORE THROAT: 0
PALPITATIONS: 0

## 2020-01-30 NOTE — PATIENT INSTRUCTIONS
Skin Yeast Infection  Skin yeast infection is a condition in which there is an overgrowth of yeast (candida) that normally lives on the skin. This condition usually occurs in areas of the skin that are constantly warm and moist, such as the armpits or the groin.  What are the causes?  This condition is caused by a change in the normal balance of the yeast and bacteria that live on the skin.  What increases the risk?  This condition is more likely to develop in:  · People who are obese.  · Pregnant women.  · Women who take birth control pills.  · People who have diabetes.  · People who take antibiotic medicines.  · People who take steroid medicines.  · People who are malnourished.  · People who have a weak defense (immune) system.  · People who are 65 years of age or older.  What are the signs or symptoms?  Symptoms of this condition include:  · A red, swollen area of the skin.  · Bumps on the skin.  · Itchiness.  How is this diagnosed?  This condition is diagnosed with a medical history and physical exam. Your health care provider may check for yeast by taking light scrapings of the skin to be viewed under a microscope.  How is this treated?  This condition is treated with medicine. Medicines may be prescribed or be available over-the-counter. The medicines may be:  · Taken by mouth (orally).  · Applied as a cream.  Follow these instructions at home:  · Take or apply over-the-counter and prescription medicines only as told by your health care provider.  · Eat more yogurt. This may help to keep your yeast infection from returning.  · Maintain a healthy weight. If you need help losing weight, talk with your health care provider.  · Keep your skin clean and dry.  · If you have diabetes, keep your blood sugar under control.  Contact a health care provider if:  · Your symptoms go away and then return.  · Your symptoms do not get better with treatment.  · Your symptoms get worse.  · Your rash spreads.  · You have a fever  or chills.  · You have new symptoms.  · You have new warmth or redness of your skin.  This information is not intended to replace advice given to you by your health care provider. Make sure you discuss any questions you have with your health care provider.  Document Released: 09/04/2012 Document Revised: 08/13/2017 Document Reviewed: 06/20/2016  [employer] Interactive Patient Education © 2017 [employer]

## 2020-01-30 NOTE — PROGRESS NOTES
Subjective:      Concetta Hargrove is a 29 y.o. female who presents with Rash (inner thighs; all over body x 2 days)            Rash   This is a new problem. The current episode started yesterday. The problem has been gradually worsening since onset. The affected locations include the groin and torso. The rash is characterized by burning, itchiness and redness. She was exposed to nothing. Pertinent negatives include no cough, fever, joint pain, shortness of breath or sore throat. Past treatments include nothing.       Review of Systems   Constitutional: Negative for fever and malaise/fatigue.   HENT: Negative for sore throat.    Respiratory: Negative for cough and shortness of breath.    Cardiovascular: Negative for chest pain and palpitations.   Musculoskeletal: Negative for joint pain and myalgias.   Skin: Positive for itching and rash.   All other systems reviewed and are negative.    PMH:  has no past medical history of Asthma or Type II or unspecified type diabetes mellitus without mention of complication, not stated as uncontrolled.  MEDS:   Current Outpatient Medications:   •  ketoconazole (NIZORAL) 2 % Cream, Apply twice daily to affected area up to one week after rash has healed.  Max 4 week use., Disp: 1 Tube, Rfl: 0  •  fluconazole (DIFLUCAN) 200 MG Tab, Take 1 Tab by mouth every 7 days for 4 doses., Disp: 4 Tab, Rfl: 0  •  ibuprofen (MOTRIN) 600 MG Tab, Take 1 Tab by mouth every 6 hours as needed for Moderate Pain. (Patient not taking: Reported on 1/29/2020), Disp: 30 Tab, Rfl: 3  ALLERGIES:   Allergies   Allergen Reactions   • Ceclor [Cefaclor] Unspecified     Stopped breathing  Tolerated ceftriaxone 4/2018    • Hydrocodone Vomiting     Tolerated oxycodone - 2018     SURGHX:   Past Surgical History:   Procedure Laterality Date   • REPEAT C SECTION N/A 3/28/2018    Procedure: REPEAT C SECTION;  Surgeon: Bernie Barriga M.D.;  Location: LABOR AND DELIVERY;  Service: Labor and Delivery   • PRIMARY C  "SECTION  7/6/2015    Procedure: PRIMARY C SECTION;  Surgeon: Bernie Barriga M.D.;  Location: LABOR AND DELIVERY;  Service:    • ABDOMINAL EXPLORATION     • APPENDECTOMY     • TONSILLECTOMY       SOCHX:  reports that she has never smoked. She has never used smokeless tobacco. She reports that she does not drink alcohol or use drugs.  FH: Family history was reviewed, no pertinent findings to report  Medications, Allergies, and current problem list reviewed today in Epic     Objective:     Blood Pressure 128/84   Pulse (Abnormal) 104   Temperature 37.9 °C (100.3 °F)   Respiration 14   Height 1.575 m (5' 2\")   Weight 64 kg (141 lb)   Last Menstrual Period 01/25/2020 (Exact Date)   Oxygen Saturation 100%   Breastfeeding? No   Body Mass Index 25.79 kg/m²      Physical Exam  Vitals signs reviewed.   Constitutional:       Appearance: She is well-developed.   Cardiovascular:      Rate and Rhythm: Normal rate and regular rhythm.      Heart sounds: Normal heart sounds.   Pulmonary:      Effort: Pulmonary effort is normal.      Breath sounds: Normal breath sounds.   Musculoskeletal: Normal range of motion.   Skin:     General: Skin is warm and dry.      Findings: Rash present.      Comments: Rash distributions:  Located:groin/torso  Size:   Pattern: psuedocellultic/morbilliform   Neurological:      Mental Status: She is alert and oriented to person, place, and time.   Psychiatric:         Behavior: Behavior normal.         Thought Content: Thought content normal.         Judgment: Judgment normal.                 Assessment/Plan:   Pt is a 29-year-old female who presents for evaluation of a rash.  Pt states 2 days ago she began a rash in her groin region.  Since then she has had different rash forming on her torso..  Pt endorses burning with pruritis with the rash.  No prior episodes.  Pt denies fever, sore throat, or arthralgias.   No new medications in last 2-3 months.  No recent travel, time outdoors, or " occupational exposure.  Vital signs show fever with tachycardia .  On exam the rash appears to be distributed in a pseudo-cellulitic pattern of the inside of both thighs/groin with satellite lesions and white exudate.  There is a secondary rash that covers the torso and a morbilliform pattern.  It does not cover the extensor/flexor surfaces, palms/soles, or mucosal surfaces.  The rash of the groin appears to a Candida skin infection.  The morbilliform rash etiology is unknown.  At this time we will treat with both Diflucan and Nizoral due to the severity of the rash.  She she is encouraged to follow-up in 3 to 5 days if no resolution or worsening symptoms.  ED precautions were discussed.    Diagnosis differential includes, but not limited to:   Morbilliform (maculopapular): measles, monocucleosis, viral exanthem, NOS, drug reaction, graft vs host, evolving.    Cellulitis/Pseudo-Cellulitis: Infection, allergic contact dermatitis, lipodermatosclerosis         1. Candidal intertrigo    - ketoconazole (NIZORAL) 2 % Cream; Apply twice daily to affected area up to one week after rash has healed.  Max 4 week use.  Dispense: 1 Tube; Refill: 0  - fluconazole (DIFLUCAN) 200 MG Tab; Take 1 Tab by mouth every 7 days for 4 doses.  Dispense: 4 Tab; Refill: 0    2. Morbilliform rash  - observe    Differential diagnosis, natural history, supportive care discussed. Follow-up with primary care provider within 7-10 days, emergency room precautions discussed.  Patient and/or family appears understanding of information.  Handout and review of patients diagnosis and treatment was discussed extensively.

## 2020-01-31 ENCOUNTER — OFFICE VISIT (OUTPATIENT)
Dept: URGENT CARE | Facility: PHYSICIAN GROUP | Age: 30
End: 2020-01-31
Payer: COMMERCIAL

## 2020-01-31 ENCOUNTER — HOSPITAL ENCOUNTER (INPATIENT)
Facility: MEDICAL CENTER | Age: 30
LOS: 2 days | DRG: 872 | End: 2020-02-02
Attending: EMERGENCY MEDICINE | Admitting: HOSPITALIST
Payer: COMMERCIAL

## 2020-01-31 VITALS
SYSTOLIC BLOOD PRESSURE: 124 MMHG | TEMPERATURE: 98.9 F | HEART RATE: 98 BPM | BODY MASS INDEX: 25.51 KG/M2 | RESPIRATION RATE: 14 BRPM | DIASTOLIC BLOOD PRESSURE: 88 MMHG | HEIGHT: 62 IN | WEIGHT: 138.6 LBS | OXYGEN SATURATION: 99 %

## 2020-01-31 DIAGNOSIS — B37.2 CANDIDAL INTERTRIGO: ICD-10-CM

## 2020-01-31 DIAGNOSIS — R21 MORBILLIFORM RASH: ICD-10-CM

## 2020-01-31 DIAGNOSIS — R50.9 FEVER, UNSPECIFIED FEVER CAUSE: ICD-10-CM

## 2020-01-31 DIAGNOSIS — R21 RASH: ICD-10-CM

## 2020-01-31 DIAGNOSIS — L03.314 CELLULITIS OF GROIN: Primary | ICD-10-CM

## 2020-01-31 LAB
ALBUMIN SERPL BCP-MCNC: 4.6 G/DL (ref 3.2–4.9)
ALBUMIN/GLOB SERPL: 1.5 G/DL
ALP SERPL-CCNC: 152 U/L (ref 30–99)
ALT SERPL-CCNC: 28 U/L (ref 2–50)
ANION GAP SERPL CALC-SCNC: 16 MMOL/L (ref 0–11.9)
AST SERPL-CCNC: 28 U/L (ref 12–45)
BASOPHILS # BLD AUTO: 0.7 % (ref 0–1.8)
BASOPHILS # BLD: 0.07 K/UL (ref 0–0.12)
BILIRUB SERPL-MCNC: 0.3 MG/DL (ref 0.1–1.5)
BUN SERPL-MCNC: 7 MG/DL (ref 8–22)
CALCIUM SERPL-MCNC: 9.5 MG/DL (ref 8.4–10.2)
CHLORIDE SERPL-SCNC: 103 MMOL/L (ref 96–112)
CO2 SERPL-SCNC: 23 MMOL/L (ref 20–33)
CREAT SERPL-MCNC: 0.64 MG/DL (ref 0.5–1.4)
CRP SERPL HS-MCNC: 0.8 MG/DL (ref 0–0.75)
EOSINOPHIL # BLD AUTO: 0.38 K/UL (ref 0–0.51)
EOSINOPHIL NFR BLD: 3.9 % (ref 0–6.9)
ERYTHROCYTE [DISTWIDTH] IN BLOOD BY AUTOMATED COUNT: 41 FL (ref 35.9–50)
GLOBULIN SER CALC-MCNC: 3.1 G/DL (ref 1.9–3.5)
GLUCOSE SERPL-MCNC: 95 MG/DL (ref 65–99)
HCT VFR BLD AUTO: 38.3 % (ref 37–47)
HGB BLD-MCNC: 12.1 G/DL (ref 12–16)
IMM GRANULOCYTES # BLD AUTO: 0.03 K/UL (ref 0–0.11)
IMM GRANULOCYTES NFR BLD AUTO: 0.3 % (ref 0–0.9)
INT CON NEG: NEGATIVE
INT CON POS: POSITIVE
LYMPHOCYTES # BLD AUTO: 1.67 K/UL (ref 1–4.8)
LYMPHOCYTES NFR BLD: 17.1 % (ref 22–41)
MCH RBC QN AUTO: 26.8 PG (ref 27–33)
MCHC RBC AUTO-ENTMCNC: 31.6 G/DL (ref 33.6–35)
MCV RBC AUTO: 84.9 FL (ref 81.4–97.8)
MONOCYTES # BLD AUTO: 0.54 K/UL (ref 0–0.85)
MONOCYTES NFR BLD AUTO: 5.5 % (ref 0–13.4)
NEUTROPHILS # BLD AUTO: 7.1 K/UL (ref 2–7.15)
NEUTROPHILS NFR BLD: 72.5 % (ref 44–72)
NRBC # BLD AUTO: 0 K/UL
NRBC BLD-RTO: 0 /100 WBC
PLATELET # BLD AUTO: 307 K/UL (ref 164–446)
PMV BLD AUTO: 9.5 FL (ref 9–12.9)
POTASSIUM SERPL-SCNC: 3.6 MMOL/L (ref 3.6–5.5)
PROT SERPL-MCNC: 7.7 G/DL (ref 6–8.2)
RBC # BLD AUTO: 4.51 M/UL (ref 4.2–5.4)
S PYO AG THROAT QL: NEGATIVE
SODIUM SERPL-SCNC: 142 MMOL/L (ref 135–145)
WBC # BLD AUTO: 9.8 K/UL (ref 4.8–10.8)

## 2020-01-31 PROCEDURE — 86140 C-REACTIVE PROTEIN: CPT

## 2020-01-31 PROCEDURE — 86060 ANTISTREPTOLYSIN O TITER: CPT

## 2020-01-31 PROCEDURE — 770006 HCHG ROOM/CARE - MED/SURG/GYN SEMI*

## 2020-01-31 PROCEDURE — 80053 COMPREHEN METABOLIC PANEL: CPT

## 2020-01-31 PROCEDURE — 87040 BLOOD CULTURE FOR BACTERIA: CPT

## 2020-01-31 PROCEDURE — 99223 1ST HOSP IP/OBS HIGH 75: CPT | Performed by: HOSPITALIST

## 2020-01-31 PROCEDURE — 85025 COMPLETE CBC W/AUTO DIFF WBC: CPT

## 2020-01-31 PROCEDURE — 87880 STREP A ASSAY W/OPTIC: CPT | Performed by: PHYSICIAN ASSISTANT

## 2020-01-31 PROCEDURE — 99285 EMERGENCY DEPT VISIT HI MDM: CPT

## 2020-01-31 PROCEDURE — 36415 COLL VENOUS BLD VENIPUNCTURE: CPT

## 2020-01-31 PROCEDURE — 700105 HCHG RX REV CODE 258: Performed by: EMERGENCY MEDICINE

## 2020-01-31 PROCEDURE — 96365 THER/PROPH/DIAG IV INF INIT: CPT

## 2020-01-31 PROCEDURE — 700111 HCHG RX REV CODE 636 W/ 250 OVERRIDE (IP): Performed by: EMERGENCY MEDICINE

## 2020-01-31 PROCEDURE — 99214 OFFICE O/P EST MOD 30 MIN: CPT | Performed by: PHYSICIAN ASSISTANT

## 2020-01-31 RX ORDER — METHYLPREDNISOLONE SODIUM SUCCINATE 40 MG/ML
40 INJECTION, POWDER, LYOPHILIZED, FOR SOLUTION INTRAMUSCULAR; INTRAVENOUS DAILY
Status: DISCONTINUED | OUTPATIENT
Start: 2020-02-01 | End: 2020-02-02 | Stop reason: HOSPADM

## 2020-01-31 RX ORDER — SODIUM CHLORIDE, SODIUM LACTATE, POTASSIUM CHLORIDE, AND CALCIUM CHLORIDE .6; .31; .03; .02 G/100ML; G/100ML; G/100ML; G/100ML
1000 INJECTION, SOLUTION INTRAVENOUS
Status: COMPLETED | OUTPATIENT
Start: 2020-01-31 | End: 2020-02-01

## 2020-01-31 RX ORDER — ACETAMINOPHEN 325 MG/1
650 TABLET ORAL EVERY 6 HOURS PRN
Status: DISCONTINUED | OUTPATIENT
Start: 2020-01-31 | End: 2020-02-02 | Stop reason: HOSPADM

## 2020-01-31 RX ORDER — SODIUM CHLORIDE, SODIUM LACTATE, POTASSIUM CHLORIDE, CALCIUM CHLORIDE 600; 310; 30; 20 MG/100ML; MG/100ML; MG/100ML; MG/100ML
INJECTION, SOLUTION INTRAVENOUS CONTINUOUS
Status: DISCONTINUED | OUTPATIENT
Start: 2020-02-01 | End: 2020-02-02 | Stop reason: HOSPADM

## 2020-01-31 RX ORDER — SODIUM CHLORIDE, SODIUM LACTATE, POTASSIUM CHLORIDE, AND CALCIUM CHLORIDE .6; .31; .03; .02 G/100ML; G/100ML; G/100ML; G/100ML
30 INJECTION, SOLUTION INTRAVENOUS
Status: DISCONTINUED | OUTPATIENT
Start: 2020-01-31 | End: 2020-02-02 | Stop reason: HOSPADM

## 2020-01-31 RX ORDER — PROMETHAZINE HYDROCHLORIDE 25 MG/1
12.5-25 SUPPOSITORY RECTAL EVERY 4 HOURS PRN
Status: DISCONTINUED | OUTPATIENT
Start: 2020-01-31 | End: 2020-02-02 | Stop reason: HOSPADM

## 2020-01-31 RX ORDER — PROMETHAZINE HYDROCHLORIDE 25 MG/1
12.5-25 TABLET ORAL EVERY 4 HOURS PRN
Status: DISCONTINUED | OUTPATIENT
Start: 2020-01-31 | End: 2020-02-02 | Stop reason: HOSPADM

## 2020-01-31 RX ORDER — DIPHENHYDRAMINE HYDROCHLORIDE 50 MG/ML
25 INJECTION INTRAMUSCULAR; INTRAVENOUS EVERY 6 HOURS
Status: DISCONTINUED | OUTPATIENT
Start: 2020-02-01 | End: 2020-02-01

## 2020-01-31 RX ORDER — PROCHLORPERAZINE EDISYLATE 5 MG/ML
5-10 INJECTION INTRAMUSCULAR; INTRAVENOUS EVERY 4 HOURS PRN
Status: DISCONTINUED | OUTPATIENT
Start: 2020-01-31 | End: 2020-02-02 | Stop reason: HOSPADM

## 2020-01-31 RX ORDER — ONDANSETRON 2 MG/ML
4 INJECTION INTRAMUSCULAR; INTRAVENOUS EVERY 4 HOURS PRN
Status: DISCONTINUED | OUTPATIENT
Start: 2020-01-31 | End: 2020-02-02 | Stop reason: HOSPADM

## 2020-01-31 RX ORDER — ONDANSETRON 4 MG/1
4 TABLET, ORALLY DISINTEGRATING ORAL EVERY 4 HOURS PRN
Status: DISCONTINUED | OUTPATIENT
Start: 2020-01-31 | End: 2020-02-02 | Stop reason: HOSPADM

## 2020-01-31 RX ADMIN — AMPICILLIN SODIUM AND SULBACTAM SODIUM 3 G: 2; 1 INJECTION, POWDER, FOR SOLUTION INTRAMUSCULAR; INTRAVENOUS at 22:44

## 2020-01-31 ASSESSMENT — PAIN SCALES - WONG BAKER: WONGBAKER_NUMERICALRESPONSE: HURTS EVEN MORE

## 2020-01-31 ASSESSMENT — ENCOUNTER SYMPTOMS
FEVER: 0
COUGH: 0

## 2020-02-01 PROBLEM — A46 ERYSIPELAS: Status: ACTIVE | Noted: 2020-02-01

## 2020-02-01 PROBLEM — A41.9 SEPSIS (HCC): Status: ACTIVE | Noted: 2020-02-01

## 2020-02-01 LAB
ALBUMIN SERPL BCP-MCNC: 3.7 G/DL (ref 3.2–4.9)
ALBUMIN/GLOB SERPL: 1.5 G/DL
ALP SERPL-CCNC: 121 U/L (ref 30–99)
ALT SERPL-CCNC: 20 U/L (ref 2–50)
ANION GAP SERPL CALC-SCNC: 10 MMOL/L (ref 0–11.9)
AST SERPL-CCNC: 19 U/L (ref 12–45)
BASOPHILS # BLD AUTO: 0.7 % (ref 0–1.8)
BASOPHILS # BLD: 0.06 K/UL (ref 0–0.12)
BILIRUB SERPL-MCNC: 0.4 MG/DL (ref 0.1–1.5)
BUN SERPL-MCNC: 6 MG/DL (ref 8–22)
CALCIUM SERPL-MCNC: 8.5 MG/DL (ref 8.4–10.2)
CHLORIDE SERPL-SCNC: 107 MMOL/L (ref 96–112)
CO2 SERPL-SCNC: 26 MMOL/L (ref 20–33)
CREAT SERPL-MCNC: 0.62 MG/DL (ref 0.5–1.4)
EOSINOPHIL # BLD AUTO: 0.35 K/UL (ref 0–0.51)
EOSINOPHIL NFR BLD: 4.3 % (ref 0–6.9)
ERYTHROCYTE [DISTWIDTH] IN BLOOD BY AUTOMATED COUNT: 42 FL (ref 35.9–50)
GLOBULIN SER CALC-MCNC: 2.5 G/DL (ref 1.9–3.5)
GLUCOSE SERPL-MCNC: 82 MG/DL (ref 65–99)
HCT VFR BLD AUTO: 32.8 % (ref 37–47)
HGB BLD-MCNC: 10.4 G/DL (ref 12–16)
IMM GRANULOCYTES # BLD AUTO: 0.03 K/UL (ref 0–0.11)
IMM GRANULOCYTES NFR BLD AUTO: 0.4 % (ref 0–0.9)
LACTATE BLD-SCNC: 0.7 MMOL/L (ref 0.5–2)
LACTATE BLD-SCNC: 1 MMOL/L (ref 0.5–2)
LACTATE BLD-SCNC: 1.9 MMOL/L (ref 0.5–2)
LYMPHOCYTES # BLD AUTO: 1.35 K/UL (ref 1–4.8)
LYMPHOCYTES NFR BLD: 16.6 % (ref 22–41)
MCH RBC QN AUTO: 27.1 PG (ref 27–33)
MCHC RBC AUTO-ENTMCNC: 31.7 G/DL (ref 33.6–35)
MCV RBC AUTO: 85.4 FL (ref 81.4–97.8)
MONOCYTES # BLD AUTO: 0.49 K/UL (ref 0–0.85)
MONOCYTES NFR BLD AUTO: 6 % (ref 0–13.4)
NEUTROPHILS # BLD AUTO: 5.85 K/UL (ref 2–7.15)
NEUTROPHILS NFR BLD: 72 % (ref 44–72)
NRBC # BLD AUTO: 0 K/UL
NRBC BLD-RTO: 0 /100 WBC
PLATELET # BLD AUTO: 256 K/UL (ref 164–446)
PMV BLD AUTO: 9.6 FL (ref 9–12.9)
POTASSIUM SERPL-SCNC: 4 MMOL/L (ref 3.6–5.5)
PROT SERPL-MCNC: 6.2 G/DL (ref 6–8.2)
RBC # BLD AUTO: 3.84 M/UL (ref 4.2–5.4)
SODIUM SERPL-SCNC: 143 MMOL/L (ref 135–145)
WBC # BLD AUTO: 8.1 K/UL (ref 4.8–10.8)

## 2020-02-01 PROCEDURE — A9270 NON-COVERED ITEM OR SERVICE: HCPCS | Performed by: HOSPITALIST

## 2020-02-01 PROCEDURE — 99233 SBSQ HOSP IP/OBS HIGH 50: CPT | Performed by: HOSPITALIST

## 2020-02-01 PROCEDURE — 700102 HCHG RX REV CODE 250 W/ 637 OVERRIDE(OP): Performed by: HOSPITALIST

## 2020-02-01 PROCEDURE — 770006 HCHG ROOM/CARE - MED/SURG/GYN SEMI*

## 2020-02-01 PROCEDURE — 80053 COMPREHEN METABOLIC PANEL: CPT

## 2020-02-01 PROCEDURE — 83605 ASSAY OF LACTIC ACID: CPT | Mod: 91

## 2020-02-01 PROCEDURE — 36415 COLL VENOUS BLD VENIPUNCTURE: CPT

## 2020-02-01 PROCEDURE — 85025 COMPLETE CBC W/AUTO DIFF WBC: CPT

## 2020-02-01 PROCEDURE — 700105 HCHG RX REV CODE 258: Performed by: HOSPITALIST

## 2020-02-01 PROCEDURE — 96375 TX/PRO/DX INJ NEW DRUG ADDON: CPT

## 2020-02-01 PROCEDURE — 700111 HCHG RX REV CODE 636 W/ 250 OVERRIDE (IP): Performed by: HOSPITALIST

## 2020-02-01 RX ORDER — DIPHENHYDRAMINE HCL 25 MG
25 TABLET ORAL EVERY 6 HOURS PRN
Status: DISCONTINUED | OUTPATIENT
Start: 2020-02-01 | End: 2020-02-02 | Stop reason: HOSPADM

## 2020-02-01 RX ADMIN — METHYLPREDNISOLONE SODIUM SUCCINATE 40 MG: 40 INJECTION, POWDER, FOR SOLUTION INTRAMUSCULAR; INTRAVENOUS at 00:22

## 2020-02-01 RX ADMIN — ACETAMINOPHEN 650 MG: 325 TABLET, FILM COATED ORAL at 22:19

## 2020-02-01 RX ADMIN — SODIUM CHLORIDE, POTASSIUM CHLORIDE, SODIUM LACTATE AND CALCIUM CHLORIDE: 600; 310; 30; 20 INJECTION, SOLUTION INTRAVENOUS at 00:22

## 2020-02-01 RX ADMIN — AMPICILLIN SODIUM AND SULBACTAM SODIUM 3 G: 2; 1 INJECTION, POWDER, FOR SOLUTION INTRAMUSCULAR; INTRAVENOUS at 11:56

## 2020-02-01 RX ADMIN — SODIUM CHLORIDE, POTASSIUM CHLORIDE, SODIUM LACTATE AND CALCIUM CHLORIDE 1000 ML: 600; 310; 30; 20 INJECTION, SOLUTION INTRAVENOUS at 18:03

## 2020-02-01 RX ADMIN — DIPHENHYDRAMINE HCL 25 MG: 25 TABLET ORAL at 22:19

## 2020-02-01 RX ADMIN — DIPHENHYDRAMINE HYDROCHLORIDE 25 MG: 50 INJECTION INTRAMUSCULAR; INTRAVENOUS at 05:03

## 2020-02-01 RX ADMIN — AMPICILLIN SODIUM AND SULBACTAM SODIUM 3 G: 2; 1 INJECTION, POWDER, FOR SOLUTION INTRAMUSCULAR; INTRAVENOUS at 22:20

## 2020-02-01 RX ADMIN — AMPICILLIN SODIUM AND SULBACTAM SODIUM 3 G: 2; 1 INJECTION, POWDER, FOR SOLUTION INTRAMUSCULAR; INTRAVENOUS at 05:01

## 2020-02-01 RX ADMIN — DIPHENHYDRAMINE HYDROCHLORIDE 25 MG: 50 INJECTION INTRAMUSCULAR; INTRAVENOUS at 00:22

## 2020-02-01 ASSESSMENT — LIFESTYLE VARIABLES
HOW MANY TIMES IN THE PAST YEAR HAVE YOU HAD 5 OR MORE DRINKS IN A DAY: 0
ON A TYPICAL DAY WHEN YOU DRINK ALCOHOL HOW MANY DRINKS DO YOU HAVE: 1
AVERAGE NUMBER OF DAYS PER WEEK YOU HAVE A DRINK CONTAINING ALCOHOL: 0
TOTAL SCORE: 0
DOES PATIENT WANT TO STOP DRINKING: NO
EVER FELT BAD OR GUILTY ABOUT YOUR DRINKING: NO
HAVE YOU EVER FELT YOU SHOULD CUT DOWN ON YOUR DRINKING: NO
ALCOHOL_USE: NO
HAVE PEOPLE ANNOYED YOU BY CRITICIZING YOUR DRINKING: NO
CONSUMPTION TOTAL: NEGATIVE
TOTAL SCORE: 0
EVER HAD A DRINK FIRST THING IN THE MORNING TO STEADY YOUR NERVES TO GET RID OF A HANGOVER: NO
TOTAL SCORE: 0
EVER_SMOKED: NEVER

## 2020-02-01 ASSESSMENT — ENCOUNTER SYMPTOMS
BLURRED VISION: 0
CHILLS: 0
SORE THROAT: 0
WEAKNESS: 0
VOMITING: 0
RESPIRATORY NEGATIVE: 1
CARDIOVASCULAR NEGATIVE: 1
LOSS OF CONSCIOUSNESS: 0
NERVOUS/ANXIOUS: 0
BACK PAIN: 0
FEVER: 0
DIZZINESS: 0
GASTROINTESTINAL NEGATIVE: 1
NAUSEA: 0
TINGLING: 0
MUSCULOSKELETAL NEGATIVE: 1
DEPRESSION: 0
FLANK PAIN: 0
SHORTNESS OF BREATH: 0
MYALGIAS: 0
NEUROLOGICAL NEGATIVE: 1
HEADACHES: 0
INSOMNIA: 0
PALPITATIONS: 0
NECK PAIN: 0
PSYCHIATRIC NEGATIVE: 1
COUGH: 0
FEVER: 1
WEIGHT LOSS: 0
EYE PAIN: 0
BRUISES/BLEEDS EASILY: 0
ABDOMINAL PAIN: 0

## 2020-02-01 ASSESSMENT — COGNITIVE AND FUNCTIONAL STATUS - GENERAL
DAILY ACTIVITIY SCORE: 24
MOBILITY SCORE: 24
SUGGESTED CMS G CODE MODIFIER DAILY ACTIVITY: CH
SUGGESTED CMS G CODE MODIFIER MOBILITY: CH

## 2020-02-01 ASSESSMENT — PATIENT HEALTH QUESTIONNAIRE - PHQ9
1. LITTLE INTEREST OR PLEASURE IN DOING THINGS: NOT AT ALL
SUM OF ALL RESPONSES TO PHQ9 QUESTIONS 1 AND 2: 0
2. FEELING DOWN, DEPRESSED, IRRITABLE, OR HOPELESS: NOT AT ALL

## 2020-02-01 ASSESSMENT — COPD QUESTIONNAIRES
HAVE YOU SMOKED AT LEAST 100 CIGARETTES IN YOUR ENTIRE LIFE: NO/DON'T KNOW
IN THE PAST 12 MONTHS DO YOU DO LESS THAN YOU USED TO BECAUSE OF YOUR BREATHING PROBLEMS: DISAGREE/UNSURE
DO YOU EVER COUGH UP ANY MUCUS OR PHLEGM?: NO/ONLY WITH OCCASIONAL COLDS OR INFECTIONS
COPD SCREENING SCORE: 0
DURING THE PAST 4 WEEKS HOW MUCH DID YOU FEEL SHORT OF BREATH: NONE/LITTLE OF THE TIME

## 2020-02-01 NOTE — PROGRESS NOTES
Received report, assumed pt care. Discussed POC. Pt has bright red rash covering groin, abdomen, and neck. Mild rash on pt's back. Pt states improvement from admission. VSS. Will cont to monitor.

## 2020-02-01 NOTE — PROGRESS NOTES
"A&Ox4, VSS, denies SOB, dizziness, or nausea. Dry cough present, lung sounds clear. Pt states rash is painful \"like a sunburn\". POC discussed, denies further needs. Safety measures and hourly rounding in place.  "

## 2020-02-01 NOTE — ASSESSMENT & PLAN NOTE
This is Sepsis Present on admission  SIRS criteria identified on my evaluation include: Fever, with temperature greater than 101 deg F and Tachycardia, with heart rate greater than 90 BPM  Source is skin infection.  Sepsis protocol initiated  Fluid resuscitation ordered per protocol  IV antibiotics as appropriate for source of sepsis  While organ dysfunction may be noted elsewhere in this problem list or in the chart, degree of organ dysfunction does not meet CMS criteria for severe sepsis  Continue IV antibiotics

## 2020-02-01 NOTE — H&P
[facility] Medicine History & Physical Note    Date of Service  2/1/2020    Primary Care Physician  Betsy Dotson P.A.-C.    Consultants  None.    Code Status  Full code.    Chief Complaint  Rash.    History of Presenting Illness  29 y.o. female who presented 1/31/2020 with rash and fever.  She presented on Wednesday to an urgent care clinic with complaint of erythematous bumps in her groin area erythema and a papular rash on her abdomen.  The urgent care provider diagnosed her with a morbilliform rash and fungal infection of the groin.  She was given fluconazole orally and ketoconazole topically.  Despite this her rash worsened and started spreading today further onto her thighs and more papular rash on her trunk.  She went to the pharmacy to refill the topical ketoconazole and they sent her to the emergency department.  In the ER she had a fever of 101.  She has had a dry cough recently and both her children did have some nonspecific viral illness in the last few weeks but no one has had strep throat and she does not have a sore throat at this time.  She had a rapid strep test done in urgent care which was negative.    I discussed the presenting symptoms, physical examination, lab and radiological study results with the emergency department physician.      Review of Systems  Review of Systems   Constitutional: Positive for fever. Negative for chills, malaise/fatigue and weight loss.   HENT: Negative.    Respiratory: Negative.  Negative for cough and shortness of breath.    Cardiovascular: Negative.  Negative for chest pain and leg swelling.   Gastrointestinal: Negative.  Negative for abdominal pain, nausea and vomiting.   Genitourinary: Negative.  Negative for dysuria and flank pain.   Musculoskeletal: Negative.  Negative for back pain and myalgias.   Skin: Positive for itching and rash.   Neurological: Negative.  Negative for dizziness, loss of consciousness and weakness.   Endo/Heme/Allergies: Negative.  Does not  bruise/bleed easily.   Psychiatric/Behavioral: Negative.  Negative for depression. The patient is not nervous/anxious.    All other systems reviewed and are negative.      Past Medical History   has no past medical history of Asthma or Type II or unspecified type diabetes mellitus without mention of complication, not stated as uncontrolled.    Surgical History   has a past surgical history that includes abdominal exploration; tonsillectomy; appendectomy; primary c section (7/6/2015); and repeat c section (N/A, 3/28/2018).     Family History  family history includes Breast Cancer in her maternal grandmother; Breast Cancer (age of onset: 33) in her mother; Cancer in her maternal grandmother; Cancer (age of onset: 33) in her mother; Hypertension in her maternal grandmother and mother; No Known Problems in her daughter and daughter; Other in her sister.     Social History   reports that she has never smoked. She has never used smokeless tobacco. She reports that she does not drink alcohol or use drugs.    Allergies  Allergies   Allergen Reactions   • Ceclor [Cefaclor] Unspecified     Stopped breathing  Tolerated ceftriaxone 4/2018    • Hydrocodone Vomiting     Tolerated oxycodone - 2018       Medications  Prior to Admission Medications   Prescriptions Last Dose Informant Patient Reported? Taking?   ketoconazole (NIZORAL) 2 % Cream   No No   Sig: Apply twice daily to affected area up to one week after rash has healed.  Max 4 week use.      Facility-Administered Medications: None       Physical Exam  Temp:  [37.1 °C (98.7 °F)-38.6 °C (101.5 °F)] 37.1 °C (98.7 °F)  Pulse:  [102-104] 102  Resp:  [16] 16  BP: (134-137)/(90-94) 134/90  SpO2:  [96 %-98 %] 96 %    Physical Exam  Vitals signs and nursing note reviewed.   Constitutional:       General: She is not in acute distress.     Appearance: She is well-developed. She is not diaphoretic.   HENT:      Right Ear: External ear normal.      Left Ear: External ear normal.       Nose: Nose normal.      Mouth/Throat:      Pharynx: No oropharyngeal exudate.   Eyes:      General: No scleral icterus.        Right eye: No discharge.         Left eye: No discharge.      Conjunctiva/sclera: Conjunctivae normal.   Neck:      Vascular: No JVD.      Trachea: No tracheal deviation.   Cardiovascular:      Rate and Rhythm: Normal rate and regular rhythm.      Heart sounds: Normal heart sounds.   Pulmonary:      Effort: Pulmonary effort is normal. No respiratory distress.      Breath sounds: Normal breath sounds. No stridor. No wheezing or rales.   Chest:      Chest wall: No tenderness.   Abdominal:      General: Bowel sounds are normal. There is no distension.      Palpations: Abdomen is soft.      Tenderness: There is no tenderness.   Genitourinary:     Pubic Area: Rash present.   Musculoskeletal:         General: No tenderness.   Skin:     General: Skin is warm and dry.      Capillary Refill: Capillary refill takes less than 2 seconds.      Coloration: Skin is not pale.      Findings: Erythema and rash (trunk, chest, arms, face and back) present. Rash is macular, papular and scaling (in pubic area).             Comments: Pubic hair has been shaved   Neurological:      General: No focal deficit present.      Mental Status: She is alert and oriented to person, place, and time.      Cranial Nerves: No cranial nerve deficit.      Motor: No abnormal muscle tone.   Psychiatric:         Mood and Affect: Mood normal.         Behavior: Behavior normal.         Thought Content: Thought content normal.         Judgment: Judgment normal.         Laboratory:  Recent Labs     01/31/20 2112   WBC 9.8   RBC 4.51   HEMOGLOBIN 12.1   HEMATOCRIT 38.3   MCV 84.9   MCH 26.8*   MCHC 31.6*   RDW 41.0   PLATELETCT 307   MPV 9.5     Recent Labs     01/31/20 2112   SODIUM 142   POTASSIUM 3.6   CHLORIDE 103   CO2 23   GLUCOSE 95   BUN 7*   CREATININE 0.64   CALCIUM 9.5     Recent Labs     01/31/20 2112   ALTSGPT 28   ASTSGOT  28   ALKPHOSPHAT 152*   TBILIRUBIN 0.3   GLUCOSE 95         No results for input(s): NTPROBNP in the last 72 hours.      No results for input(s): TROPONINT in the last 72 hours.    Urinalysis:    No results found     Imaging:  No orders to display         Assessment/Plan:  I anticipate this patient will require at least two midnights for appropriate medical management, necessitating inpatient admission.    * Erysipelas- (present on admission)  Assessment & Plan  Suspected from history and appearance of the rash.  Worse may be streptococcal with entry point from shaving of the pubic hair.  accompanied by sepsis, this condition can be accompanied by a toxidrome and shock, currently not in shock but initiating sepsis protocol and fluids.  ASO titers sent.  Start Unasyn intravenously.  Blood cultures drawn.  Give dose of methylprednisolone 40 mg IV, she is very itchy.  Also start Benadryl 25 mg IV.    Sepsis (HCC)- (present on admission)  Assessment & Plan  This is Sepsis Present on admission  SIRS criteria identified on my evaluation include: Fever, with temperature greater than 101 deg F and Tachycardia, with heart rate greater than 90 BPM  Source is skin infection.  Sepsis protocol initiated  Fluid resuscitation ordered per protocol  IV antibiotics as appropriate for source of sepsis  While organ dysfunction may be noted elsewhere in this problem list or in the chart, degree of organ dysfunction does not meet CMS criteria for severe sepsis            VTE prophylaxis: SCD

## 2020-02-01 NOTE — ASSESSMENT & PLAN NOTE
Not improved.   Suspected from history and appearance of the rash.   ASO titers negative- suspect strep.  Continue Unasyn.  Blood cultures drawn.  Give dose of methylprednisolone 40 mg IV, she is very itchy.  Also start Benadryl 25 mg IV.  improving

## 2020-02-01 NOTE — PROGRESS NOTES
2 RN skin assessment complete. Skin not WNL. Diffuse red rash over neck, chest, abdomen, groin and back. Skin is otherwise intact.

## 2020-02-01 NOTE — ED PROVIDER NOTES
"ED Provider Note    CHIEF COMPLAINT  No chief complaint on file.      HPI  Concetta Hargrove is a 29 y.o. female who presents with a diffuse rash.  The rash started on Monday her period started 2 days prior to that.  She since Monday has had worsening rash and started on her thighs.  She went to urgent care on Wednesday and was diagnosed with a yeast infection.  She was given topical and oral antifungal medications and since then the rash is only gotten worse.  It is now spread to her abdomen and up to her neck.  She had a slight fever on Wednesday 100.3 at urgent care and then today was 101.  She denies any sore throat no cough.  The rash is not itchy there was some fluid she says around the rash on her legs when this started that seems to be getting slightly better. At urgent care the rapid strep was negative.      REVIEW OF SYSTEMS  positive for rash fever sore throat, negative for abdominal pain. All other systems are negative.     PAST MEDICAL HISTORY       SOCIAL HISTORY  Social History     Tobacco Use   • Smoking status: Never Smoker   • Smokeless tobacco: Never Used   Substance and Sexual Activity   • Alcohol use: No     Comment: occasional   • Drug use: No   • Sexual activity: Yes     Partners: Male     Birth control/protection: Surgical     Comment: . Vastectomy.        SURGICAL HISTORY   has a past surgical history that includes abdominal exploration; tonsillectomy; appendectomy; primary c section (7/6/2015); and repeat c section (N/A, 3/28/2018).    CURRENT MEDICATIONS  Home Medications    **Home medications have not yet been reviewed for this encounter**         ALLERGIES  Allergies   Allergen Reactions   • Ceclor [Cefaclor] Unspecified     Stopped breathing  Tolerated ceftriaxone 4/2018    • Hydrocodone Vomiting     Tolerated oxycodone - 2018       PHYSICAL EXAM  VITAL SIGNS: /90   Pulse (!) 102   Temp 37.1 °C (98.7 °F)   Resp 16   Ht 1.575 m (5' 2\")   Wt 59 kg (130 lb)   LMP " 01/25/2020 (Exact Date)   SpO2 96%   BMI 23.78 kg/m² .  Constitutional: Alert in no apparent distress.  HENT: No signs of trauma, Bilateral external ears normal, Nose normal.  Slight posterior oropharynx erythema  Eyes: Pupils are equal and reactive, Conjunctiva normal, Non-icteric.   Neck: Normal range of motion, No tenderness, Supple, No stridor.   Cardiovascular: Regular rate and rhythm, no murmurs.   Thorax & Lungs: Normal breath sounds, No respiratory distress, No wheezing, No chest tenderness.   Abdomen: Bowel sounds normal, Soft, No tenderness, No masses, No peritoneal signs.  Pelvic: The skin around the perineum is extremely erythematous and sloughing especially over the mons pubis.  Otherwise external benign genitalia is normal there is no foreign body inside the vagina.  Skin: Warm, Dry, diffuse erythematous rash more concentrated in her groin and thigh area she has beet red erythema in her perineum with skin sloughing over her mons pubis.  She has diffuse erythematous sandpapery rash extending up her trunk to her neck and her back   Musculoskeletal:  no major deformities noted.   Neurologic: Alert,  No focal deficits noted.   Psychiatric: Affect normal, Judgment normal, Mood normal.       DIAGNOSTIC STUDIES / PROCEDURES        LABS  Labs Reviewed   CBC WITH DIFFERENTIAL - Abnormal; Notable for the following components:       Result Value    MCH 26.8 (*)     MCHC 31.6 (*)     Neutrophils-Polys 72.50 (*)     Lymphocytes 17.10 (*)     All other components within normal limits   COMP METABOLIC PANEL - Abnormal; Notable for the following components:    Anion Gap 16.0 (*)     Bun 7 (*)     Alkaline Phosphatase 152 (*)     All other components within normal limits   CRP QUANTITIVE (NON-CARDIAC) - Abnormal; Notable for the following components:    Stat C-Reactive Protein 0.80 (*)     All other components within normal limits   BLOOD CULTURE    Narrative:     Per Hospital Policy: Only change Specimen Src: to  "\"Line\" if  specified by physician order.   BLOOD CULTURE    Narrative:     Per Hospital Policy: Only change Specimen Src: to \"Line\" if  specified by physician order.   ANTISTREPTOLYSIN O   ESTIMATED GFR           COURSE & MEDICAL DECISION MAKING  Pertinent Labs & Imaging studies reviewed. (See chart for details)    This is a otherwise healthy 29-year-old female that presents with diffuse rash and fever.  The rash is quite impressive especially around her groin area with skin sloughing.  She has a fever as well.  Differential includes but is not limited to staph infection, scarlet fever, less likely toxic shock syndrome.      Labs are obtained she has normal white blood cell count without left shift her CRP is only minimally elevated.  ASO titer is pending.  Pelvic exam was negative for retained foreign body therefore I do not think she has toxic shock.  She is otherwise hemodynamically stable.  However given the diffuse nature of the rash and worsening rash I am concerned for staph infection and will cover her with antibiotics and hospitalize her.  I spoke with Dr. Brown, hospitalist who is agreeable to consult for hospitalization.    Patient will be hospitalized in guarded condition.    FINAL IMPRESSION  1. Rash     2. Fever, unspecified fever cause         2.   3.    This dictation has been creating using voice recognition software. The accuracy of the dictation is limited the abilities of the software.  I expect there may be some errors of grammar and possibly content. I made every attempt to manually correct the errors within my dictation. However errors related to this voice recognition software may still exist and should be interpreted within the appropriate context.      The note accurately reflects work and decisions made by me.  Mare Baumann M.D.  1/31/2020  10:50 PM    "

## 2020-02-01 NOTE — PROGRESS NOTES
[facility] Medicine Daily Progress Note    Date of Service  2/1/2020    Chief Complaint  29 y.o. female admitted 1/31/2020 with a rash.     Hospital Course    She was found to have a groin cellulitis with associated strep related rash and sepsis. She was treated with IV antibiotics and sepsis protocols.       Interval Problem Update  Improving. Rash on her shoulders is worse but erythema in her groin is improving.   Prolonged services in addition to admission h/p e/m time  is from [id number] today.      Consultants/Specialty  none    Code Status  full    Disposition  tbd    Review of Systems  Review of Systems   Constitutional: Negative for chills and fever.   HENT: Negative for sore throat.    Eyes: Negative for blurred vision and pain.   Respiratory: Negative for cough and shortness of breath.    Cardiovascular: Negative for chest pain and palpitations.   Gastrointestinal: Negative for abdominal pain, nausea and vomiting.   Genitourinary: Negative for dysuria and urgency.   Musculoskeletal: Negative for back pain and neck pain.   Skin: Positive for rash. Negative for itching.        Groin cellulitis   Neurological: Negative for dizziness, tingling and headaches.   Psychiatric/Behavioral: Negative for depression. The patient does not have insomnia.    All other systems reviewed and are negative.       Physical Exam  Temp:  [36.4 °C (97.6 °F)-38.6 °C (101.5 °F)] 36.4 °C (97.6 °F)  Pulse:  [] 104  Resp:  [16-20] 20  BP: (133-140)/(78-94) 140/82  SpO2:  [96 %-100 %] 100 %    Physical Exam  Vitals signs and nursing note reviewed.   Constitutional:       General: She is not in acute distress.     Appearance: She is well-developed. She is not diaphoretic.   HENT:      Right Ear: External ear normal.      Left Ear: External ear normal.      Nose: Nose normal.   Eyes:      General:         Right eye: No discharge.         Left eye: No discharge.      Conjunctiva/sclera: Conjunctivae normal.   Neck:      Vascular: No  JVD.   Cardiovascular:      Rate and Rhythm: Regular rhythm.      Heart sounds: Normal heart sounds. No murmur.   Pulmonary:      Effort: Pulmonary effort is normal. No respiratory distress.      Breath sounds: Normal breath sounds. No stridor. No wheezing or rales.   Abdominal:      General: Bowel sounds are normal. There is no distension.      Palpations: Abdomen is soft.      Tenderness: There is no tenderness.   Musculoskeletal:         General: No tenderness.   Skin:     General: Skin is warm and dry.      Findings: Rash present. No erythema.      Comments: Groin cellultis   Neurological:      Mental Status: She is alert and oriented to person, place, and time.   Psychiatric:         Behavior: Behavior normal.         Fluids    Intake/Output Summary (Last 24 hours) at 2/1/2020 0918  Last data filed at 2/1/2020 0104  Gross per 24 hour   Intake --   Output 400 ml   Net -400 ml       Laboratory  Recent Labs     01/31/20 2112 02/01/20  0753   WBC 9.8 8.1   RBC 4.51 3.84*   HEMOGLOBIN 12.1 10.4*   HEMATOCRIT 38.3 32.8*   MCV 84.9 85.4   MCH 26.8* 27.1   MCHC 31.6* 31.7*   RDW 41.0 42.0   PLATELETCT 307 256   MPV 9.5 9.6     Recent Labs     01/31/20 2112 02/01/20  0753   SODIUM 142 143   POTASSIUM 3.6 4.0   CHLORIDE 103 107   CO2 23 26   GLUCOSE 95 82   BUN 7* 6*   CREATININE 0.64 0.62   CALCIUM 9.5 8.5                   Imaging  No orders to display        Assessment/Plan  * Erysipelas- (present on admission)  Assessment & Plan  Not improved.   Suspected from history and appearance of the rash.   ASO titers negative- suspect strep.  Continue Unasyn.  Blood cultures drawn.  Give dose of methylprednisolone 40 mg IV, she is very itchy.  Also start Benadryl 25 mg IV.  improving    Sepsis (HCC)- (present on admission)  Assessment & Plan  This is Sepsis Present on admission  SIRS criteria identified on my evaluation include: Fever, with temperature greater than 101 deg F and Tachycardia, with heart rate greater than 90  BPM  Source is skin infection.  Sepsis protocol initiated  Fluid resuscitation ordered per protocol  IV antibiotics as appropriate for source of sepsis  While organ dysfunction may be noted elsewhere in this problem list or in the chart, degree of organ dysfunction does not meet CMS criteria for severe sepsis  Continue IV antibiotics             VTE prophylaxis: scd

## 2020-02-02 VITALS
BODY MASS INDEX: 23.92 KG/M2 | WEIGHT: 130 LBS | SYSTOLIC BLOOD PRESSURE: 125 MMHG | OXYGEN SATURATION: 98 % | DIASTOLIC BLOOD PRESSURE: 80 MMHG | RESPIRATION RATE: 20 BRPM | HEIGHT: 62 IN | HEART RATE: 91 BPM | TEMPERATURE: 97.7 F

## 2020-02-02 LAB
ALBUMIN SERPL BCP-MCNC: 3.5 G/DL (ref 3.2–4.9)
ALBUMIN/GLOB SERPL: 1.7 G/DL
ALP SERPL-CCNC: 116 U/L (ref 30–99)
ALT SERPL-CCNC: 17 U/L (ref 2–50)
ANION GAP SERPL CALC-SCNC: 11 MMOL/L (ref 0–11.9)
ASO AB SERPL-ACNC: 145 IU/ML (ref 0–330)
AST SERPL-CCNC: 18 U/L (ref 12–45)
BASOPHILS # BLD AUTO: 0.7 % (ref 0–1.8)
BASOPHILS # BLD: 0.06 K/UL (ref 0–0.12)
BILIRUB SERPL-MCNC: 0.3 MG/DL (ref 0.1–1.5)
BUN SERPL-MCNC: 6 MG/DL (ref 8–22)
CALCIUM SERPL-MCNC: 8.5 MG/DL (ref 8.4–10.2)
CHLORIDE SERPL-SCNC: 107 MMOL/L (ref 96–112)
CO2 SERPL-SCNC: 24 MMOL/L (ref 20–33)
CREAT SERPL-MCNC: 0.55 MG/DL (ref 0.5–1.4)
EOSINOPHIL # BLD AUTO: 0.52 K/UL (ref 0–0.51)
EOSINOPHIL NFR BLD: 5.9 % (ref 0–6.9)
ERYTHROCYTE [DISTWIDTH] IN BLOOD BY AUTOMATED COUNT: 42 FL (ref 35.9–50)
GLOBULIN SER CALC-MCNC: 2.1 G/DL (ref 1.9–3.5)
GLUCOSE SERPL-MCNC: 83 MG/DL (ref 65–99)
HCT VFR BLD AUTO: 32.5 % (ref 37–47)
HGB BLD-MCNC: 10.4 G/DL (ref 12–16)
IMM GRANULOCYTES # BLD AUTO: 0.03 K/UL (ref 0–0.11)
IMM GRANULOCYTES NFR BLD AUTO: 0.3 % (ref 0–0.9)
LACTATE BLD-SCNC: 0.9 MMOL/L (ref 0.5–2)
LYMPHOCYTES # BLD AUTO: 1.9 K/UL (ref 1–4.8)
LYMPHOCYTES NFR BLD: 21.4 % (ref 22–41)
MCH RBC QN AUTO: 27.6 PG (ref 27–33)
MCHC RBC AUTO-ENTMCNC: 32 G/DL (ref 33.6–35)
MCV RBC AUTO: 86.2 FL (ref 81.4–97.8)
MONOCYTES # BLD AUTO: 0.63 K/UL (ref 0–0.85)
MONOCYTES NFR BLD AUTO: 7.1 % (ref 0–13.4)
NEUTROPHILS # BLD AUTO: 5.73 K/UL (ref 2–7.15)
NEUTROPHILS NFR BLD: 64.6 % (ref 44–72)
NRBC # BLD AUTO: 0 K/UL
NRBC BLD-RTO: 0 /100 WBC
PLATELET # BLD AUTO: 272 K/UL (ref 164–446)
PMV BLD AUTO: 9.9 FL (ref 9–12.9)
POTASSIUM SERPL-SCNC: 3.6 MMOL/L (ref 3.6–5.5)
PROT SERPL-MCNC: 5.6 G/DL (ref 6–8.2)
RBC # BLD AUTO: 3.77 M/UL (ref 4.2–5.4)
SODIUM SERPL-SCNC: 142 MMOL/L (ref 135–145)
WBC # BLD AUTO: 8.9 K/UL (ref 4.8–10.8)

## 2020-02-02 PROCEDURE — 36415 COLL VENOUS BLD VENIPUNCTURE: CPT

## 2020-02-02 PROCEDURE — A9270 NON-COVERED ITEM OR SERVICE: HCPCS | Performed by: HOSPITALIST

## 2020-02-02 PROCEDURE — 83605 ASSAY OF LACTIC ACID: CPT

## 2020-02-02 PROCEDURE — 700105 HCHG RX REV CODE 258: Performed by: HOSPITALIST

## 2020-02-02 PROCEDURE — 85025 COMPLETE CBC W/AUTO DIFF WBC: CPT

## 2020-02-02 PROCEDURE — 700102 HCHG RX REV CODE 250 W/ 637 OVERRIDE(OP): Performed by: HOSPITALIST

## 2020-02-02 PROCEDURE — 80053 COMPREHEN METABOLIC PANEL: CPT

## 2020-02-02 PROCEDURE — 99239 HOSP IP/OBS DSCHRG MGMT >30: CPT | Performed by: HOSPITALIST

## 2020-02-02 PROCEDURE — 700111 HCHG RX REV CODE 636 W/ 250 OVERRIDE (IP): Performed by: HOSPITALIST

## 2020-02-02 RX ORDER — AMOXICILLIN AND CLAVULANATE POTASSIUM 875; 125 MG/1; MG/1
1 TABLET, FILM COATED ORAL 2 TIMES DAILY
Qty: 12 TAB | Refills: 0 | Status: SHIPPED
Start: 2020-02-02 | End: 2020-02-05

## 2020-02-02 RX ADMIN — ACETAMINOPHEN 650 MG: 325 TABLET, FILM COATED ORAL at 04:22

## 2020-02-02 RX ADMIN — METHYLPREDNISOLONE SODIUM SUCCINATE 40 MG: 40 INJECTION, POWDER, FOR SOLUTION INTRAMUSCULAR; INTRAVENOUS at 05:20

## 2020-02-02 RX ADMIN — DIPHENHYDRAMINE HCL 25 MG: 25 TABLET ORAL at 04:22

## 2020-02-02 RX ADMIN — AMPICILLIN SODIUM AND SULBACTAM SODIUM 3 G: 2; 1 INJECTION, POWDER, FOR SOLUTION INTRAMUSCULAR; INTRAVENOUS at 05:20

## 2020-02-02 ASSESSMENT — PATIENT HEALTH QUESTIONNAIRE - PHQ9
1. LITTLE INTEREST OR PLEASURE IN DOING THINGS: NOT AT ALL
2. FEELING DOWN, DEPRESSED, IRRITABLE, OR HOPELESS: NOT AT ALL
SUM OF ALL RESPONSES TO PHQ9 QUESTIONS 1 AND 2: 0

## 2020-02-02 NOTE — PROGRESS NOTES
Received report from NOC RN; assumed pt care. Red Rash noted, per pt it seems to be improving, itching continues. Pt notified to call for assistance.

## 2020-02-02 NOTE — DISCHARGE INSTRUCTIONS
Discharge Instructions    Discharged to home by car with relative. Discharged via wheelchair, hospital escort: Yes.  Special equipment needed: Not Applicable    Be sure to schedule a follow-up appointment with your primary care doctor or any specialists as instructed.     Discharge Plan:   Influenza Vaccine Indication: Not indicated: Previously immunized this influenza season and > 8 years of age    I understand that a diet low in cholesterol, fat, and sodium is recommended for good health. Unless I have been given specific instructions below for another diet, I accept this instruction as my diet prescription.   Other diet: Regular    Special Instructions: None    · Is patient discharged on Warfarin / Coumadin?   No     Depression / Suicide Risk    As you are discharged from this [facility] facility, it is important to learn how to keep safe from harming yourself.    Recognize the warning signs:  · Abrupt changes in personality, positive or negative- including increase in energy   · Giving away possessions  · Change in eating patterns- significant weight changes-  positive or negative  · Change in sleeping patterns- unable to sleep or sleeping all the time   · Unwillingness or inability to communicate  · Depression  · Unusual sadness, discouragement and loneliness  · Talk of wanting to die  · Neglect of personal appearance   · Rebelliousness- reckless behavior  · Withdrawal from people/activities they love  · Confusion- inability to concentrate     If you or a loved one observes any of these behaviors or has concerns about self-harm, here's what you can do:  · Talk about it- your feelings and reasons for harming yourself  · Remove any means that you might use to hurt yourself (examples: pills, rope, extension cords, firearm)  · Get professional help from the community (Mental Health, Substance Abuse, psychological counseling)  · Do not be alone:Call your Safe Contact- someone whom you trust who will be there for  you.  · Call your local CRISIS HOTLINE 620-1141 or 827-052-7723  · Call your local Children's Mobile Crisis Response Team [place] (521) 185-6945 or www.Domob  · Call the toll free National Suicide Prevention Hotlines   · National Suicide Prevention Lifeline 533-302-NKXF (9018)  · National Hope Line Network 800-SUICIDE (341-8105)  Erysipelas  Erysipelas is an infection of the skin around the nose, cheeks, ears, legs or on the buttocks. Erysipelas usually occurs in young children and the elderly. It is caused by entry of bacteria (strep or streptococcus) through breaks in the skin. The breaks in the skin may be caused by:  · A recent injury to the skin through trauma.   · Swelling of the skin due to radiation therapy.   · Current skin infection (such as athelete's foot or impetigo).   · Accumulation of fluid through:   · Poor circulation.   · Heart failure.   · Liver disease.   · Past surgery to remove lymph nodes.   · Being overweight.   This condition may start suddenly with:  · A red rash raised above the level of surrounding skin.   · Local heat.   · Swelling.   The rash may be preceded by:  · Chills.   · High fever.   · Headache.   · Vomiting.   · Joint pains.   It usually requires 10 days of antibiotic treatment to make sure the infection is gone. Sometimes the first doses are given by injection. If your leg is infected, especially when blisters are present, you may require hospitalization and intravenous (IV) antibiotics.  Get plenty of rest and drink extra fluids for the next few days. If erysipelas involves a limb, keep the affected limb elevated. Only take over-the-counter or prescription medicines for pain, discomfort, or fever as directed by your caregiver. Call your caregiver if your rash and other symptoms worsen or do not improve over the next 1-2 days.   SEEK IMMEDIATE MEDICAL CARE IF:  · You develop a severe headache, weakness, dehydration, or a high fever.   · You develop repeated  vomiting, chest or abdominal pain.   · You develop an allergic reaction to your medicine such as hives, itching, swelling, breathing problems, or fainting.   Document Released: 01/25/2006 Document Revised: 03/11/2013 Document Reviewed: 02/05/2010  [employer]® Patient Information ©2013 [employer].

## 2020-02-02 NOTE — PROGRESS NOTES
Report received from George WARE. Pt awake, alert, appropriate and pleasant. Pt reports significant anxiety however that she is not getting better. Calmed and reassured pt. Pt denies further needs at this time. Call light in reach. Will continue to monitor.

## 2020-02-02 NOTE — PROGRESS NOTES
Discharging pt home per MD order. Discussed discharge instructions, follow up appointments, prescriptions, and home care. Pt voiding and ambulating without difficulty, pain controlled, tolerating diet. Family at bedside, all questions answered. Pt discharged off unit with hospital escort at 1229.

## 2020-02-02 NOTE — CARE PLAN
Problem: Communication  Goal: The ability to communicate needs accurately and effectively will improve  Outcome: PROGRESSING AS EXPECTED  Note:   Pt calls appropriately, makes needs known     Problem: Safety  Goal: Will remain free from injury  Outcome: PROGRESSING AS EXPECTED  Intervention: Provide assistance with mobility  Flowsheets (Taken 2/2/2020 0258)  Assistance: No Assistance Required  Ambulation Tolerance: Tolerates Well  Goal: Will remain free from falls  Outcome: PROGRESSING AS EXPECTED  Intervention: Assess risk factors for falls  Flowsheets (Taken 2/2/2020 0258)  Pt Calls for Assistance: No assistance required  History of fall: 0  Mobility Status Assessment: 0-Ambulates & Transfers Independently. No Assistance Required     Problem: Venous Thromboembolism (VTW)/Deep Vein Thrombosis (DVT) Prevention:  Goal: Patient will participate in Venous Thrombosis (VTE)/Deep Vein Thrombosis (DVT)Prevention Measures  Outcome: PROGRESSING AS EXPECTED     Problem: Bowel/Gastric:  Goal: Normal bowel function is maintained or improved  Outcome: PROGRESSING AS EXPECTED  Goal: Will not experience complications related to bowel motility  Outcome: PROGRESSING AS EXPECTED     Problem: Knowledge Deficit  Goal: Knowledge of disease process/condition, treatment plan, diagnostic tests, and medications will improve  Outcome: PROGRESSING AS EXPECTED  Goal: Knowledge of the prescribed therapeutic regimen will improve  Outcome: PROGRESSING AS EXPECTED     Problem: Discharge Barriers/Planning  Goal: Patient's continuum of care needs will be met  Outcome: PROGRESSING AS EXPECTED     Problem: Pain Management  Goal: Pain level will decrease to patient's comfort goal  Outcome: PROGRESSING AS EXPECTED     Problem: Fluid Volume:  Goal: Will maintain balanced intake and output  Outcome: PROGRESSING AS EXPECTED     Problem: Infection  Goal: Will remain free from infection  Outcome: PROGRESSING SLOWER THAN EXPECTED  Note:   Continued severe  rash, no improvement ntoed     Problem: Psychosocial Needs:  Goal: Level of anxiety will decrease  Outcome: PROGRESSING SLOWER THAN EXPECTED  Note:   Anxious, crying at multiple points this shift, concerned about infection

## 2020-02-02 NOTE — DISCHARGE SUMMARY
Discharge Summary    CHIEF COMPLAINT ON ADMISSION  No chief complaint on file.      Reason for Admission  Rash     Admission Date  1/31/2020    CODE STATUS  Prior    HPI & HOSPITAL COURSE  This is a 29 y.o. female here with a rash.    She was found to have a groin cellulitis with associated strep related rash and sepsis. She was treated with IV antibiotics and sepsis protocols.  She improved with therapy. Cultures were negative here. This is likely a strep cellulitis with associated rash due to shaving the groin. She will complete a course of oral antibiotics and follow up as needed with her PCP.     Therefore, she is discharged in good and stable condition to home with close outpatient follow-up.    The patient met 2-midnight criteria for an inpatient stay at the time of discharge.    Discharge Date  2/2/2020    FOLLOW UP ITEMS POST DISCHARGE  Pcp[    DISCHARGE DIAGNOSES  Principal Problem:    Erysipelas POA: Yes  Active Problems:    Sepsis (HCC) POA: Yes  Resolved Problems:    * No resolved hospital problems. *      FOLLOW UP  No future appointments.  JEN HallALASHANDA  [address]  [facility] [address]  [phone]            MEDICATIONS ON DISCHARGE     Medication List      START taking these medications      Instructions   amoxicillin-clavulanate 875-125 MG Tabs  Commonly known as:  AUGMENTIN   Take 1 Tab by mouth 2 times a day for 6 days.  Dose:  1 Tab        CONTINUE taking these medications      Instructions   ketoconazole 2 % Crea  Commonly known as:  NIZORAL   Apply twice daily to affected area up to one week after rash has healed.  Max 4 week use.            Allergies  Allergies   Allergen Reactions   • Ceclor [Cefaclor] Unspecified     Stopped breathing  Tolerated ceftriaxone 4/2018    • Hydrocodone Vomiting     Tolerated oxycodone - 2018       DIET  No orders of the defined types were placed in this encounter.      ACTIVITY  As tolerated.  Weight bearing as  tolerated    CONSULTATIONS  none    PROCEDURES  none    LABORATORY  Lab Results   Component Value Date    SODIUM 142 02/02/2020    POTASSIUM 3.6 02/02/2020    CHLORIDE 107 02/02/2020    CO2 24 02/02/2020    GLUCOSE 83 02/02/2020    BUN 6 (L) 02/02/2020    CREATININE 0.55 02/02/2020        Lab Results   Component Value Date    WBC 8.9 02/02/2020    HEMOGLOBIN 10.4 (L) 02/02/2020    HEMATOCRIT 32.5 (L) 02/02/2020    PLATELETCT 272 02/02/2020        Total time of the discharge process exceeds 34 minutes.

## 2020-02-05 ENCOUNTER — OFFICE VISIT (OUTPATIENT)
Dept: MEDICAL GROUP | Facility: PHYSICIAN GROUP | Age: 30
End: 2020-02-05
Payer: COMMERCIAL

## 2020-02-05 VITALS
OXYGEN SATURATION: 96 % | HEART RATE: 87 BPM | DIASTOLIC BLOOD PRESSURE: 80 MMHG | BODY MASS INDEX: 25.62 KG/M2 | HEIGHT: 62 IN | TEMPERATURE: 99 F | SYSTOLIC BLOOD PRESSURE: 130 MMHG | WEIGHT: 139.2 LBS

## 2020-02-05 DIAGNOSIS — T36.0X5A PENICILLIN-INDUCED ALLERGIC RASH: ICD-10-CM

## 2020-02-05 DIAGNOSIS — L27.0 PENICILLIN-INDUCED ALLERGIC RASH: ICD-10-CM

## 2020-02-05 DIAGNOSIS — A46 ERYSIPELAS: ICD-10-CM

## 2020-02-05 PROCEDURE — 99214 OFFICE O/P EST MOD 30 MIN: CPT | Performed by: PHYSICIAN ASSISTANT

## 2020-02-05 RX ORDER — CLINDAMYCIN HYDROCHLORIDE 300 MG/1
300 CAPSULE ORAL 4 TIMES DAILY
Qty: 28 CAP | Refills: 0 | Status: SHIPPED | OUTPATIENT
Start: 2020-02-05 | End: 2020-03-17 | Stop reason: CLARIF

## 2020-02-06 ENCOUNTER — TELEPHONE (OUTPATIENT)
Dept: MEDICAL GROUP | Facility: PHYSICIAN GROUP | Age: 30
End: 2020-02-06

## 2020-02-06 NOTE — TELEPHONE ENCOUNTER
"1. Caller Name: Concetta Karlie Aitchison                          Call Back Number: [phone] (home)       How would the patient prefer to be contacted with a response: Phone call do NOT leave a detailed message    Received a conerned call from Pt stating that her rash has \"grown\". She stated it has spread more down her legs on the back of her calves and top of thighs. Tamanna,please advise.    "

## 2020-02-10 NOTE — PROGRESS NOTES
Chief Complaint   Patient presents with   • Rash     All over       HISTORY OF PRESENT ILLNESS: Concetta Hargrove is an established 29 y.o. female here to discuss the evaluation and management of:    Patient is here to discuss recent hospitalization and rash.  Patient was hospitalized from 1/31/2020- 2/2/20.  Patient was found to have groin cellulitis with associated strep related rash and sepsis.  Per chart review patient was treated with IV antibiotics and sepsis protocol.  Patient was given penicillin.  She tells me during hospitalization rash had spread from groin and abdominal area to upper extremities.  Cultures were negative and patient was discharged home with amoxicillin.  He tells me since discharge rashes continue to grow.  Admits that the groin area has improved as far as color (less red) but rash has continued to grow.  She denies fever, chills, nausea, vomiting, pruritus, tachycardia, tach romano.  Patient's vitals are stable during today's appointment.  Patient is anxious.  She is afraid that current antibiotic is not working.  Patient has a maculopapular rash on bilateral hands, bilateral upper extremities, abdomen, chest, neck, and bilateral proximal lower extremities.  Rash is blanchable.  She denies blisters/itching/discharge/warmth.  Admits that skin is peeling on neck.       Patient Active Problem List    Diagnosis Date Noted   • Tubo-ovarian abscess 06/14/2019     Priority: High   • Erysipelas 02/01/2020   • Sepsis (HCC) 02/01/2020       Allergies:Ceclor [cefaclor]; Hydrocodone; and Penicillins    Current Outpatient Medications   Medication Sig Dispense Refill   • clindamycin (CLEOCIN) 300 MG Cap Take 1 Cap by mouth 4 times a day. 28 Cap 0   • ketoconazole (NIZORAL) 2 % Cream Apply twice daily to affected area up to one week after rash has healed.  Max 4 week use. (Patient not taking: Reported on 2/5/2020) 1 Tube 0     No current facility-administered medications for this visit.        Social  "History     Tobacco Use   • Smoking status: Never Smoker   • Smokeless tobacco: Never Used   Substance Use Topics   • Alcohol use: No     Comment: occasional   • Drug use: No       Family Status   Relation Name Status   • Mo  Alive   • Fa  Alive   • MGMo  Alive   • Sis  Alive   • Fletcher  Alive   • Fletcher  Alive     Family History   Problem Relation Age of Onset   • Hypertension Mother    • Breast Cancer Mother 33   • Cancer Mother 33        Uterine. Full hysterectomy 35.   • Hypertension Maternal Grandmother    • Breast Cancer Maternal Grandmother    • Cancer Maternal Grandmother         Uterine CA. Full hysterectomy.    • Other Sister         Ovarian Cyst   • No Known Problems Daughter    • No Known Problems Daughter        ROS:  Review of Systems   Constitutional: Negative for fever, chills, weight loss and malaise/fatigue.   HENT: Negative for ear pain, nosebleeds, congestion, sore throat and neck pain.    Eyes: Negative for blurred vision.   Respiratory: Negative for cough, sputum production, shortness of breath and wheezing.    Cardiovascular: Negative for chest pain, palpitations, orthopnea and leg swelling.   Gastrointestinal: Negative for heartburn, nausea, vomiting and abdominal pain.   Genitourinary: Negative for dysuria, urgency and frequency.   Musculoskeletal: Negative for myalgias, back pain and joint pain.   Skin: Positive for rash.  Negative for itching.  Neurological: Negative for dizziness, tingling, tremors, sensory change, focal weakness and headaches.   Endo/Heme/Allergies: Does not bruise/bleed easily.   Psychiatric/Behavioral: Negative for depression, suicidal ideas and memory loss.  The patient is not nervous/anxious and does not have insomnia.    All other systems reviewed and are negative except as in HPI.    Exam: /80 (BP Location: Left arm, Patient Position: Sitting, BP Cuff Size: Adult)   Pulse 87   Temp 37.2 °C (99 °F) (Temporal)   Ht 1.575 m (5' 2\")   Wt 63.1 kg (139 lb 3.2 oz)   " SpO2 96%  Body mass index is 25.46 kg/m².  General: Normal appearing.  Patient is anxious.  HEENT: Normocephalic. Eyes conjunctiva clear lids without ptosis, ears normal shape and contour.  Neck: Supple without JVD. Thyroid is not enlarged.  Pulmonary: Clear to ausculation.  Normal effort. No rales, ronchi, or wheezing.  Cardiovascular: Regular rate and rhythm without murmur.   Abdomen: Soft, nontender, nondistended.  Neurologic: Grossly nonfocal.  Cranial nerves are normal.   Lymph: No cervical or supraclavicular lymph nodes are palpable  Skin: Warm and dry.  No suspicious lesions.  Patient has a maculopapular rash on bilateral hands on the palmar surface as well as posterior surface.  Maculopapular rash is also present on patient's bilateral upper extremities, chest, abdomen, neck, groin region.  Rash is blanchable.  No signs of infection.  Negative for warmth/discharge/excoriation.  Positive for mild peeling of patient's neck.  Musculoskeletal: Normal gait. No extremity cyanosis, clubbing, or edema.  Psych: Normal mood and affect. Alert and oriented x3. Judgment and insight is normal.    Medical decision-making and discussion:  1. Erysipelas  2. Penicillin-induced allergic rash  Vitals are stable.  Patient is recently hospitalized and diagnosed with erysipelas.  Positive for strep.  It appears patient is suffering from penicillin-induced allergic reaction.  Rash is blanchable.  No signs of infection.  Dr. Arredondo, a colleague of [facility] also examined rash.  She agrees that it appears to be a penicillin-induced allergic reaction.  Discontinued amoxicillin during today's appointment.  Patient has been prescribed clindamycin.  Advised patient take probiotics when taking antibiotics due to potential gastrointestinal symptoms.  Advised patient rash symptoms should improve in 3-4 days.  Contact me if rash is not improving.  Discussed ED precautions.  Continue to monitor.    - clindamycin (CLEOCIN) 300 MG Cap; Take 1 Cap  by mouth 4 times a day.  Dispense: 28 Cap; Refill: 0      Please note that this dictation was created using voice recognition software. I have made every reasonable attempt to correct obvious errors, but I expect that there are errors of grammar and possibly content that I did not discover before finalizing the note.    Assessment/Plan:  1. Erysipelas  clindamycin (CLEOCIN) 300 MG Cap   2. Penicillin-induced allergic rash         Return if symptoms worsen or fail to improve.

## 2020-03-17 ENCOUNTER — OFFICE VISIT (OUTPATIENT)
Dept: URGENT CARE | Facility: PHYSICIAN GROUP | Age: 30
End: 2020-03-17
Payer: COMMERCIAL

## 2020-03-17 ENCOUNTER — HOSPITAL ENCOUNTER (OUTPATIENT)
Facility: MEDICAL CENTER | Age: 30
End: 2020-03-17
Attending: PHYSICIAN ASSISTANT
Payer: COMMERCIAL

## 2020-03-17 VITALS
DIASTOLIC BLOOD PRESSURE: 74 MMHG | SYSTOLIC BLOOD PRESSURE: 122 MMHG | OXYGEN SATURATION: 96 % | TEMPERATURE: 98.9 F | RESPIRATION RATE: 18 BRPM | HEART RATE: 90 BPM

## 2020-03-17 DIAGNOSIS — N30.00 ACUTE CYSTITIS WITHOUT HEMATURIA: ICD-10-CM

## 2020-03-17 LAB
APPEARANCE UR: CLEAR
BILIRUB UR STRIP-MCNC: NORMAL MG/DL
COLOR UR AUTO: YELLOW
GLUCOSE UR STRIP.AUTO-MCNC: NORMAL MG/DL
INT CON NEG: NEGATIVE
INT CON POS: POSITIVE
KETONES UR STRIP.AUTO-MCNC: NORMAL MG/DL
LEUKOCYTE ESTERASE UR QL STRIP.AUTO: NORMAL
NITRITE UR QL STRIP.AUTO: NORMAL
PH UR STRIP.AUTO: 6.5 [PH] (ref 5–8)
POC URINE PREGNANCY TEST: NORMAL
PROT UR QL STRIP: NORMAL MG/DL
RBC UR QL AUTO: NORMAL
SP GR UR STRIP.AUTO: 1.02
UROBILINOGEN UR STRIP-MCNC: 0.2 MG/DL

## 2020-03-17 PROCEDURE — 81025 URINE PREGNANCY TEST: CPT | Performed by: PHYSICIAN ASSISTANT

## 2020-03-17 PROCEDURE — 87186 SC STD MICRODIL/AGAR DIL: CPT

## 2020-03-17 PROCEDURE — 99214 OFFICE O/P EST MOD 30 MIN: CPT | Performed by: PHYSICIAN ASSISTANT

## 2020-03-17 PROCEDURE — 81002 URINALYSIS NONAUTO W/O SCOPE: CPT | Performed by: PHYSICIAN ASSISTANT

## 2020-03-17 PROCEDURE — 87077 CULTURE AEROBIC IDENTIFY: CPT

## 2020-03-17 PROCEDURE — 87086 URINE CULTURE/COLONY COUNT: CPT

## 2020-03-17 RX ORDER — NITROFURANTOIN 25; 75 MG/1; MG/1
100 CAPSULE ORAL EVERY 12 HOURS
Qty: 10 CAP | Refills: 0 | Status: SHIPPED | OUTPATIENT
Start: 2020-03-17 | End: 2020-03-22

## 2020-03-17 RX ORDER — PHENAZOPYRIDINE HYDROCHLORIDE 100 MG/1
100 TABLET, FILM COATED ORAL 3 TIMES DAILY PRN
Qty: 6 TAB | Refills: 0 | Status: SHIPPED | OUTPATIENT
Start: 2020-03-17 | End: 2020-04-14

## 2020-03-19 ASSESSMENT — ENCOUNTER SYMPTOMS
FLANK PAIN: 0
VOMITING: 0
NAUSEA: 1
FEVER: 0
CHILLS: 0

## 2020-03-19 NOTE — PROGRESS NOTES
Subjective:      Concetta Hargrove is a 29 y.o. female who presents with Dysuria (poss uti x2 days)            Dysuria    This is a new problem. The current episode started yesterday. The problem occurs every urination. The problem has been waxing and waning. There has been no fever. She is sexually active. There is no history of pyelonephritis. Associated symptoms include frequency, hesitancy, nausea and urgency. Pertinent negatives include no chills, discharge, flank pain, hematuria, possible pregnancy or vomiting. She has tried increased fluids for the symptoms. The treatment provided mild relief.       Review of Systems   Constitutional: Negative for chills and fever.   Gastrointestinal: Positive for nausea. Negative for vomiting.        Reports intermittent abd. Cramping- none at this time.      Genitourinary: Positive for dysuria, frequency, hesitancy and urgency. Negative for flank pain and hematuria.   All other systems reviewed and are negative.         Objective:     /74 (BP Location: Left arm, Patient Position: Sitting, BP Cuff Size: Small adult)   Pulse 90   Temp 37.2 °C (98.9 °F) (Temporal)   Resp 18   SpO2 96%    PMH:  has no past medical history of Asthma or Type II or unspecified type diabetes mellitus without mention of complication, not stated as uncontrolled.  MEDS: Reviewed .   ALLERGIES:   Allergies   Allergen Reactions   • Ceclor [Cefaclor] Unspecified     Stopped breathing  Tolerated ceftriaxone 4/2018    • Hydrocodone Vomiting     Tolerated oxycodone - 2018   • Penicillins      Skin rash.     SURGHX:   Past Surgical History:   Procedure Laterality Date   • REPEAT C SECTION N/A 3/28/2018    Procedure: REPEAT C SECTION;  Surgeon: Bernie Barriga M.D.;  Location: LABOR AND DELIVERY;  Service: Labor and Delivery   • PRIMARY C SECTION  7/6/2015    Procedure: PRIMARY C SECTION;  Surgeon: Bernie Barriga M.D.;  Location: LABOR AND DELIVERY;  Service:    • ABDOMINAL EXPLORATION     •  APPENDECTOMY     • TONSILLECTOMY       SOCHX:  reports that she has never smoked. She has never used smokeless tobacco. She reports that she does not drink alcohol or use drugs.  FH: Family history was reviewed, no pertinent findings to report    Physical Exam  Vitals signs reviewed.   Constitutional:       Appearance: She is well-developed.   HENT:      Head: Normocephalic and atraumatic.   Eyes:      Pupils: Pupils are equal, round, and reactive to light.   Neck:      Musculoskeletal: Normal range of motion and neck supple.   Cardiovascular:      Rate and Rhythm: Normal rate and regular rhythm.      Heart sounds: No murmur.   Pulmonary:      Effort: Pulmonary effort is normal. No respiratory distress.      Breath sounds: Normal breath sounds.   Abdominal:      General: Bowel sounds are normal. There is no distension.      Palpations: Abdomen is soft.      Comments:  Negative CVAT.    Musculoskeletal: Normal range of motion.   Skin:     General: Skin is warm and dry.   Neurological:      Mental Status: She is alert and oriented to person, place, and time.   Psychiatric:         Behavior: Behavior normal.                 Assessment/Plan:       1. Acute cystitis without hematuria  - nitrofurantoin (MACROBID) 100 MG Cap; Take 1 Cap by mouth every 12 hours for 5 days.  Dispense: 10 Cap; Refill: 0  - URINE CULTURE(NEW); Future  - phenazopyridine (PYRIDIUM) 100 MG Tab; Take 1 Tab by mouth 3 times a day as needed.  Dispense: 6 Tab; Refill: 0  - POCT Urinalysis  - POCT Pregnancy    NEgative HCG.   UA- small Le. Sent for culture.   Pt. Was given ABX therapy today and will change therapy if culture indicates this is necessary. ER precautions given- worsening symptoms, back pain, abd. Pain, or fevers.   Pt. Is to increase fluids, and take the complete duration of the therapy.   Pt. Understands and agrees with the plan.   F/U with PCP in 3-4 days as needed.

## 2020-03-20 DIAGNOSIS — N30.00 ACUTE CYSTITIS WITHOUT HEMATURIA: ICD-10-CM

## 2020-03-20 RX ORDER — CIPROFLOXACIN 500 MG/1
500 TABLET, FILM COATED ORAL 2 TIMES DAILY
Qty: 10 TAB | Refills: 0 | Status: SHIPPED | OUTPATIENT
Start: 2020-03-20 | End: 2020-03-25

## 2020-03-30 ENCOUNTER — TELEPHONE (OUTPATIENT)
Dept: MEDICAL GROUP | Facility: MEDICAL CENTER | Age: 30
End: 2020-03-30

## 2020-03-30 NOTE — TELEPHONE ENCOUNTER
1. Caller Name: Concetta Karlie Aitchison                          Call Back Number: [phone] (home)       How would the patient prefer to be contacted with a response: Phone call OK to leave a detailed message    Pt called stating she was seen at  on 3/17 for a uti. She stated she is still having burning and some back pain. Pt wondering if she can have an appt as a phone visit or would you like her to come in ? Please advise.

## 2020-04-14 ENCOUNTER — HOSPITAL ENCOUNTER (OUTPATIENT)
Facility: MEDICAL CENTER | Age: 30
End: 2020-04-14
Attending: PHYSICIAN ASSISTANT
Payer: COMMERCIAL

## 2020-04-14 ENCOUNTER — OFFICE VISIT (OUTPATIENT)
Dept: MEDICAL GROUP | Facility: PHYSICIAN GROUP | Age: 30
End: 2020-04-14
Payer: COMMERCIAL

## 2020-04-14 VITALS
OXYGEN SATURATION: 98 % | TEMPERATURE: 98.9 F | RESPIRATION RATE: 16 BRPM | SYSTOLIC BLOOD PRESSURE: 128 MMHG | WEIGHT: 141.2 LBS | HEART RATE: 108 BPM | HEIGHT: 62 IN | DIASTOLIC BLOOD PRESSURE: 80 MMHG | BODY MASS INDEX: 25.98 KG/M2

## 2020-04-14 DIAGNOSIS — N89.8 VAGINAL DISCHARGE: ICD-10-CM

## 2020-04-14 DIAGNOSIS — R30.0 DYSURIA: ICD-10-CM

## 2020-04-14 DIAGNOSIS — N39.0 RECURRENT UTI: ICD-10-CM

## 2020-04-14 LAB
APPEARANCE UR: CLEAR
BILIRUB UR STRIP-MCNC: NORMAL MG/DL
COLOR UR AUTO: YELLOW
GLUCOSE UR STRIP.AUTO-MCNC: NORMAL MG/DL
KETONES UR STRIP.AUTO-MCNC: NORMAL MG/DL
LEUKOCYTE ESTERASE UR QL STRIP.AUTO: NORMAL
NITRITE UR QL STRIP.AUTO: NORMAL
PH UR STRIP.AUTO: 5.5 [PH] (ref 5–8)
PROT UR QL STRIP: NORMAL MG/DL
RBC UR QL AUTO: NORMAL
SP GR UR STRIP.AUTO: 1.01
UROBILINOGEN UR STRIP-MCNC: 0.2 MG/DL

## 2020-04-14 PROCEDURE — 99213 OFFICE O/P EST LOW 20 MIN: CPT | Performed by: PHYSICIAN ASSISTANT

## 2020-04-14 PROCEDURE — 87480 CANDIDA DNA DIR PROBE: CPT

## 2020-04-14 PROCEDURE — 87660 TRICHOMONAS VAGIN DIR PROBE: CPT

## 2020-04-14 PROCEDURE — 81002 URINALYSIS NONAUTO W/O SCOPE: CPT | Performed by: PHYSICIAN ASSISTANT

## 2020-04-14 PROCEDURE — 87086 URINE CULTURE/COLONY COUNT: CPT

## 2020-04-14 PROCEDURE — 87510 GARDNER VAG DNA DIR PROBE: CPT

## 2020-04-14 ASSESSMENT — FIBROSIS 4 INDEX: FIB4 SCORE: 0.5

## 2020-04-15 LAB
CANDIDA DNA VAG QL PROBE+SIG AMP: POSITIVE
G VAGINALIS DNA VAG QL PROBE+SIG AMP: POSITIVE
T VAGINALIS DNA VAG QL PROBE+SIG AMP: NEGATIVE

## 2020-04-15 NOTE — PROGRESS NOTES
Chief Complaint   Patient presents with   • Dysuria     possible off and on x 1 month       HISTORY OF PRESENT ILLNESS: Concetta Hargrove is an established 29 y.o. female here to discuss the evaluation and management of:    Patient is a pleasant 29-year-old female here today to discuss dysuria.  Patient states on March 14, 2020 she started experiencing dysuria.  She tells me that she followed up with urgent care on March 17, 2020 and was diagnosed with a urinary tract infection.  States she was prescribed Macrobid.  States she was still symptomatic after taking antibiotics compliantly for 3-4 days.  States she contacted the provider who prescribed the antibiotics to her and ciprofloxacin was prescribed twice daily for 5 days.  States she took medication compliantly but is still experiencing dysuria.  She does mention her last menstrual period was on 03/30/2020 and lasted for 7 days in duration.  States for the past 1 week she has been experiencing intermittent episodes of left flank pain and right low back pain.  She also mentions that she has been feeling intermittently nauseous after eating.  For the past 1 week she has been experiencing abnormal white fishy vaginal discharge.  Patient does have a positive medical history for Bactrim vaginosis and yeast infection.  She tells me that she stays hydrated.  Admits to being sexually active and feels when she sexually active dysuria symptoms are exacerbated.  States she drinks on average 3 cups of coffee per day.  She also tells me that her mother had a positive past medical history for recurrent UTIs when she was in her 30s and she followed up with a urologist.  States her mother had to have a stent placed and partial nephrectomy.      Patient denies sexually transmitted infections.  States she and her  are in a monogamous relationship.  She denies pelvic pressure, abdominal pain, urgency, frequency, hematuria, fever, night sweats, unintentional weight loss,  lymphadenopathy, joint pain, headaches.      Patient Active Problem List    Diagnosis Date Noted   • Tubo-ovarian abscess 06/14/2019     Priority: High   • Erysipelas 02/01/2020   • Sepsis (HCC) 02/01/2020       Allergies:Ceclor [cefaclor]; Hydrocodone; and Penicillins    No current outpatient medications on file.     No current facility-administered medications for this visit.        Social History     Tobacco Use   • Smoking status: Never Smoker   • Smokeless tobacco: Never Used   Substance Use Topics   • Alcohol use: No     Comment: occasional   • Drug use: No       Family Status   Relation Name Status   • Mo  Alive   • Fa  Alive   • MGMo  Alive   • Sis  Alive   • Fletcher  Alive   • Fletcher  Alive     Family History   Problem Relation Age of Onset   • Hypertension Mother    • Breast Cancer Mother 33   • Cancer Mother 33        Uterine. Full hysterectomy 35.   • Hypertension Maternal Grandmother    • Breast Cancer Maternal Grandmother    • Cancer Maternal Grandmother         Uterine CA. Full hysterectomy.    • Other Sister         Ovarian Cyst   • No Known Problems Daughter    • No Known Problems Daughter        ROS:  Review of Systems   Constitutional: Negative for fever, chills, weight loss and malaise/fatigue.   HENT: Negative for ear pain, nosebleeds, congestion, sore throat and neck pain.    Eyes: Negative for blurred vision.   Respiratory: Negative for cough, sputum production, shortness of breath and wheezing.    Cardiovascular: Negative for chest pain, palpitations, orthopnea and leg swelling.   Gastrointestinal: Negative for heartburn, nausea, vomiting and abdominal pain.   Genitourinary: Negative for dysuria, urgency and frequency.  Positive for dysuria.  Positive for foul white abnormal vaginal discharge.  Musculoskeletal: Negative for myalgias, back pain and joint pain.   Skin: Negative for rash and itching.   Neurological: Negative for dizziness, tingling, tremors, sensory change, focal weakness and  "headaches.   Endo/Heme/Allergies: Does not bruise/bleed easily.   Psychiatric/Behavioral: Negative for depression, suicidal ideas and memory loss.  The patient is not nervous/anxious and does not have insomnia.    All other systems reviewed and are negative except as in HPI.    Exam: /80 (BP Location: Left arm, Patient Position: Sitting)   Pulse (!) 108   Temp 37.2 °C (98.9 °F) (Temporal)   Resp 16   Ht 1.575 m (5' 2\")   Wt 64 kg (141 lb 3.2 oz)   SpO2 98%  Body mass index is 25.83 kg/m².  General: Normal appearing. No distress.  HEENT: Normocephalic. Eyes conjunctiva clear lids without ptosis, ears normal shape and contour.  Neck: Supple without JVD. Thyroid is not enlarged.  Pulmonary: Clear to ausculation.  Normal effort. No rales, ronchi, or wheezing.  Cardiovascular: Regular rate and rhythm without murmur.   Abdomen: Soft, nontender, nondistended. Normal bowel sounds. Liver and spleen are not palpable.  Negative for CVA tenderness.  Negative for suprapubic pelvic tenderness with palpation.  Neurologic: Grossly nonfocal.  Cranial nerves are normal.   Lymph: No cervical or supraclavicular lymph nodes are palpable  Skin: Warm and dry.  No rashes or suspicious skin lesions.  Musculoskeletal: Normal gait. No extremity cyanosis, clubbing, or edema.  Psych: Normal mood and affect. Alert and oriented x3. Judgment and insight is normal.    Medical decision-making and discussion:  1. Recurrent UTI  Lab Results   Component Value Date/Time    POCCOLOR yellow 04/14/2020 04:30 PM    POCAPPEAR clear 04/14/2020 04:30 PM    POCLEUKEST neg 04/14/2020 04:30 PM    POCNITRITE neg 04/14/2020 04:30 PM    POCUROBILIGE 0.2 04/14/2020 04:30 PM    POCPROTEIN neg 04/14/2020 04:30 PM    POCURPH 5.5 04/14/2020 04:30 PM    POCBLOOD neg 04/14/2020 04:30 PM    POCSPGRV 1.010 04/14/2020 04:30 PM    POCKETONES neg 04/14/2020 04:30 PM    POCBILIRUBIN neg 04/14/2020 04:30 PM    POCGLUCUA neg 04/14/2020 04:30 PM     Discussed with " patient urinalysis does not indicate signs of infection but a urine culture has been ordered.  Given the patient's had recurrent UTIs referral to urology has been placed.    PLAN:    RTC if symptoms not improving within 3-4 days or in case of vomiting, fever, increasing pain.   EDUCATION: drink plenty of fluids, wipe front to back every void and BM.  Advised patient to void before and after intercourse.  Do not hold the restroom.  Advised patient decrease caffeine consumption. Suggested for patient take over-the-counter probiotics once daily.  Patient will be contacted with results.    - POCT Urinalysis  - URINE CULTURE  - REFERRAL TO UROLOGY    2. Vaginal discharge  Discussed importance of practicing good feminine hygiene.  Suggested patient take over-the-counter probiotics once daily.  Affirm has been ordered to further evaluate patient.  Patient be contacted with results.    - VAGINAL PATHOGENS DNA PANEL; Future    3. Dysuria  Lab Results   Component Value Date/Time    POCCOLOR yellow 04/14/2020 04:30 PM    POCAPPEAR clear 04/14/2020 04:30 PM    POCLEUKEST neg 04/14/2020 04:30 PM    POCNITRITE neg 04/14/2020 04:30 PM    POCUROBILIGE 0.2 04/14/2020 04:30 PM    POCPROTEIN neg 04/14/2020 04:30 PM    POCURPH 5.5 04/14/2020 04:30 PM    POCBLOOD neg 04/14/2020 04:30 PM    POCSPGRV 1.010 04/14/2020 04:30 PM    POCKETONES neg 04/14/2020 04:30 PM    POCBILIRUBIN neg 04/14/2020 04:30 PM    POCGLUCUA neg 04/14/2020 04:30 PM      Same as # 1.    - POCT Urinalysis  - URINE CULTURE      Please note that this dictation was created using voice recognition software. I have made every reasonable attempt to correct obvious errors, but I expect that there are errors of grammar and possibly content that I did not discover before finalizing the note.    Assessment/Plan:  1. Recurrent UTI  POCT Urinalysis    URINE CULTURE    REFERRAL TO UROLOGY   2. Vaginal discharge  VAGINAL PATHOGENS DNA PANEL   3. Dysuria  POCT Urinalysis    URINE  CULTURE       Return if symptoms worsen or fail to improve.

## 2020-04-17 DIAGNOSIS — B37.31 VAGINAL YEAST INFECTION: ICD-10-CM

## 2020-04-17 DIAGNOSIS — N76.0 BACTERIAL VAGINOSIS: ICD-10-CM

## 2020-04-17 DIAGNOSIS — B96.89 BACTERIAL VAGINOSIS: ICD-10-CM

## 2020-04-17 DIAGNOSIS — B37.2 CANDIDAL INTERTRIGO: ICD-10-CM

## 2020-04-17 LAB
BACTERIA UR CULT: NORMAL
SIGNIFICANT IND 70042: NORMAL
SITE SITE: NORMAL
SOURCE SOURCE: NORMAL

## 2020-04-17 RX ORDER — METRONIDAZOLE 500 MG/1
TABLET ORAL
Qty: 14 TAB | Refills: 0 | Status: SHIPPED | OUTPATIENT
Start: 2020-04-17 | End: 2021-02-17

## 2020-04-17 RX ORDER — FLUCONAZOLE 150 MG/1
TABLET ORAL
Qty: 2 TAB | Refills: 0 | Status: SHIPPED | OUTPATIENT
Start: 2020-04-17 | End: 2020-06-22

## 2020-04-24 ENCOUNTER — HOSPITAL ENCOUNTER (OUTPATIENT)
Dept: LAB | Facility: MEDICAL CENTER | Age: 30
End: 2020-04-24
Attending: OBSTETRICS & GYNECOLOGY
Payer: COMMERCIAL

## 2020-04-24 PROCEDURE — 87480 CANDIDA DNA DIR PROBE: CPT

## 2020-04-24 PROCEDURE — 87660 TRICHOMONAS VAGIN DIR PROBE: CPT

## 2020-04-24 PROCEDURE — 87510 GARDNER VAG DNA DIR PROBE: CPT

## 2020-05-19 ENCOUNTER — OFFICE VISIT (OUTPATIENT)
Dept: HEMATOLOGY ONCOLOGY | Facility: MEDICAL CENTER | Age: 30
End: 2020-05-19
Payer: COMMERCIAL

## 2020-05-19 VITALS
TEMPERATURE: 99.5 F | WEIGHT: 141.09 LBS | RESPIRATION RATE: 18 BRPM | BODY MASS INDEX: 25.96 KG/M2 | DIASTOLIC BLOOD PRESSURE: 84 MMHG | OXYGEN SATURATION: 93 % | HEART RATE: 88 BPM | SYSTOLIC BLOOD PRESSURE: 124 MMHG | HEIGHT: 62 IN

## 2020-05-19 DIAGNOSIS — Z80.3 FAMILY HISTORY OF BREAST CANCER: ICD-10-CM

## 2020-05-19 DIAGNOSIS — Z80.49 FAMILY HISTORY OF UTERINE CANCER: ICD-10-CM

## 2020-05-19 DIAGNOSIS — Z80.8 FAMILY HISTORY OF BONE CANCER: ICD-10-CM

## 2020-05-19 PROCEDURE — 99203 OFFICE O/P NEW LOW 30 MIN: CPT | Performed by: MEDICAL GENETICS

## 2020-05-19 ASSESSMENT — FIBROSIS 4 INDEX: FIB4 SCORE: 0.5

## 2020-05-19 NOTE — PROGRESS NOTES
GENETIC RISK ASSESSMENT    Concetta Hargrove is a 29 y.o. year old patient who was referred by Linus for cancer genetic risk assessment.    Chief Complaint: family history of cancer    HPI: The patient does not carry a cancer diagnosis  The patient's mother (breast-35, uterine- 33, bone) GM, GGM GGM all uterine  Review of personal and family histories suggested that a cancer genetic risk assessment was in order.    Review of Systems (ROS):  Constitutional N  Eyes N  ENT N   Respiratory N  Cardiovascular N  Gastrointestinal N  Genitourinary N  Musculoskeletal N  Skin N  Neurological N  Endocrine N  Psychiatric N  Hematologic/lymphatic N  Allergic/Immunologic ceclor, pcn, vicodin  All other systems reviewed and are negative.    History (Past/Family)  Family History:   Family History   Problem Relation Age of Onset   • Hypertension Mother    • Breast Cancer Mother 33   • Cancer Mother 33        Uterine. Full hysterectomy 35.   • Hypertension Maternal Grandmother    • Breast Cancer Maternal Grandmother    • Cancer Maternal Grandmother         Uterine CA. Full hysterectomy.    • Other Sister         Ovarian Cyst   • No Known Problems Daughter    • No Known Problems Daughter       3 generation pedigree built   Yes   [place] empiric risk calculation No   [place] II empiric risk calculation  No    Social History:       Social History     Socioeconomic History   • Marital status:      Spouse name: Not on file   • Number of children: Not on file   • Years of education: Not on file   • Highest education level: Not on file   Occupational History   • Not on file   Social Needs   • Financial resource strain: Not on file   • Food insecurity     Worry: Not on file     Inability: Not on file   • Transportation needs     Medical: Not on file     Non-medical: Not on file   Tobacco Use   • Smoking status: Never Smoker   • Smokeless tobacco: Never Used   Substance and Sexual Activity   • Alcohol use: No     Comment:  occasional   • Drug use: No   • Sexual activity: Yes     Partners: Male     Birth control/protection: Surgical     Comment: . Vastectomy.    Lifestyle   • Physical activity     Days per week: Not on file     Minutes per session: Not on file   • Stress: Not on file   Relationships   • Social connections     Talks on phone: Not on file     Gets together: Not on file     Attends [employer] service: Not on file     Active member of club or organization: Not on file     Attends meetings of clubs or organizations: Not on file     Relationship status: Not on file   • Intimate partner violence     Fear of current or ex partner: Not on file     Emotionally abused: Not on file     Physically abused: Not on file     Forced sexual activity: Not on file   Other Topics Concern   • Not on file   Social History Narrative   • Not on file       Patient Past History:  No past medical history on file.        NCCN Testing Criteria Met                            Yes    Physical Exam  Constitutional    There were no vitals taken for this visit.        No PE done for covid 19 protocols   Assessment and Plan:  Patient with family history of cancers    I spent a total of 30 minutes of face to face time with >50% of time spent in counseling and coordination of care discussing matters stated in above note.    Dustin panel ordered      Tremayne Calles M.D.

## 2020-05-19 NOTE — NON-PROVIDER
Kit was sent home with patient to complete and send off to [facility] via [employer] due to COVID-19 and Provider's preference.     Instructed patient on how to collect successful saliva specimen, all necessary documents were sent with the kit. Patient agreed and verbalized understanding.

## 2020-06-07 NOTE — PROGRESS NOTES
"Non face to face prolonged services of clinical data and chart information reviewed for a total time of 30 performed on 6/6 and 6/7 for Concetta Hargrove  Reviewed were:    Referral    Previous encounters    Imaging all imaging including mammography, colonoscopy, CT/tomography, MRI/PET    Previous procedures all biopsy and surgical procedures including pathology analysis and interpretation    Notes      Media all personal and family clinical history and documentation including germline DNA analysis and interpretation     Miscellaneous reports    Patient summaries family history as documented by referring clinician  Counseling, testing information and materials prepared  \"I spent 30 minutes  from 1630 to 1700 reviewing past records, prior to visit pertaining to genetic risk.\"   Tremayne Calles M.D.  "

## 2020-06-08 ENCOUNTER — OFFICE VISIT (OUTPATIENT)
Dept: HEMATOLOGY ONCOLOGY | Facility: MEDICAL CENTER | Age: 30
End: 2020-06-08
Payer: COMMERCIAL

## 2020-06-08 DIAGNOSIS — Z80.9 FAMILY HISTORY OF CANCER: ICD-10-CM

## 2020-06-08 PROCEDURE — 99441 PR PHYSICIAN TELEPHONE EVALUATION 5-10 MIN: CPT | Mod: CR | Performed by: MEDICAL GENETICS

## 2020-06-08 NOTE — PROGRESS NOTES
Telephone Appointment Visit   As a means of avoiding spread of COVID-19, this visit is being conducted by telephone. This telephone visit was initiated by the patient and they verbally consented.    Time at start of call: 0941    Reason for Call:  Lab Follow-up    HPI:    Patient originally referred for perceived increased risk of hereditary predisposition to cancer  The patient underwent detailed genetic counseling, risk assessment  Risks and benefits of testing were discussed at previous visit to include discrimination and familial implcations  Patient agreed to testing     Labs / Images Reviewed:   [facility] panel results   NO PATHOGENIC VARIANTS OF SIGNIFICANCE IDENTIFIED    All questions answered     Assessment and Plan:     There are no diagnoses linked to this encounter.    Follow-up: none indicated    Time at end of call: 7464  Total Time Spent: 5-10 minutes    Tremayne Calles M.D.

## 2020-06-09 ENCOUNTER — APPOINTMENT (OUTPATIENT)
Dept: HEMATOLOGY ONCOLOGY | Facility: MEDICAL CENTER | Age: 30
End: 2020-06-09
Payer: COMMERCIAL

## 2020-06-22 ENCOUNTER — OFFICE VISIT (OUTPATIENT)
Dept: URGENT CARE | Facility: PHYSICIAN GROUP | Age: 30
End: 2020-06-22
Payer: COMMERCIAL

## 2020-06-22 VITALS
OXYGEN SATURATION: 99 % | RESPIRATION RATE: 16 BRPM | DIASTOLIC BLOOD PRESSURE: 68 MMHG | SYSTOLIC BLOOD PRESSURE: 116 MMHG | HEIGHT: 62 IN | WEIGHT: 137.6 LBS | TEMPERATURE: 99.4 F | BODY MASS INDEX: 25.32 KG/M2 | HEART RATE: 100 BPM

## 2020-06-22 DIAGNOSIS — Z86.19 HISTORY OF CANDIDAL VULVOVAGINITIS: ICD-10-CM

## 2020-06-22 DIAGNOSIS — J01.01 ACUTE RECURRENT MAXILLARY SINUSITIS: ICD-10-CM

## 2020-06-22 PROCEDURE — 99214 OFFICE O/P EST MOD 30 MIN: CPT | Performed by: FAMILY MEDICINE

## 2020-06-22 RX ORDER — DOXYCYCLINE HYCLATE 100 MG
100 TABLET ORAL 2 TIMES DAILY
Qty: 14 TAB | Refills: 0 | Status: SHIPPED | OUTPATIENT
Start: 2020-06-22 | End: 2020-06-29

## 2020-06-22 RX ORDER — FLUCONAZOLE 150 MG/1
150 TABLET ORAL DAILY
Qty: 2 TAB | Refills: 0 | Status: SHIPPED | OUTPATIENT
Start: 2020-06-22 | End: 2021-02-17

## 2020-06-22 ASSESSMENT — ENCOUNTER SYMPTOMS
MYALGIAS: 0
NAUSEA: 0
SORE THROAT: 0
COUGH: 0
VOMITING: 0
WEIGHT LOSS: 0
EYE DISCHARGE: 0
EYE REDNESS: 0

## 2020-06-22 ASSESSMENT — FIBROSIS 4 INDEX: FIB4 SCORE: 0.51

## 2020-06-23 NOTE — PROGRESS NOTES
"Subjective:      Concetta Hargrove is a 30 y.o. female who presents with Sinus Pain (and ear pain x1 week )            1 week sinus pressure and drainage.  Left earache without drainage from ear.  No fever.  Symptoms are moderate severity and progressively worse.  No hearing loss.  No recent swimming.  No trauma barotrauma to ear.  PMH sinusitis and adult OM.  No sinus surgeries. PMH seasonal allergies.  Minimal relief with OTC medications/theraflu. No other aggravating or alleviating factors.      Review of Systems   Constitutional: Negative for malaise/fatigue and weight loss.   HENT: Negative for sore throat.    Eyes: Negative for discharge and redness.   Respiratory: Negative for cough.    Gastrointestinal: Negative for nausea and vomiting.   Musculoskeletal: Negative for joint pain and myalgias.   Skin: Negative for itching and rash.     .  Medications, Allergies, and current problem list reviewed today in Epic  PMH candida vaginitis with abx use     Objective:     /68 (BP Location: Left arm, Patient Position: Sitting, BP Cuff Size: Adult)   Pulse 100   Temp 37.4 °C (99.4 °F) (Temporal)   Resp 16   Ht 1.575 m (5' 2\")   Wt 62.4 kg (137 lb 9.6 oz)   SpO2 99%   BMI 25.17 kg/m²      Physical Exam  Constitutional:       General: She is not in acute distress.     Appearance: She is well-developed.   HENT:      Head: Normocephalic and atraumatic.      Right Ear: Tympanic membrane normal.      Ears:      Comments: Left TM slightly bulging     Nose: Congestion present.      Comments: Tender left maxillary sinus. +PND  Eyes:      Conjunctiva/sclera: Conjunctivae normal.   Cardiovascular:      Rate and Rhythm: Normal rate and regular rhythm.      Heart sounds: Normal heart sounds. No murmur.   Pulmonary:      Effort: Pulmonary effort is normal.      Breath sounds: Normal breath sounds. No wheezing.   Skin:     General: Skin is warm and dry.      Findings: No rash.   Neurological:      Mental Status: She " is alert and oriented to person, place, and time.                 Assessment/Plan:     1. Acute recurrent maxillary sinusitis  doxycycline (VIBRAMYCIN) 100 MG Tab   2. History of candidal vulvovaginitis  fluconazole (DIFLUCAN) 150 MG tablet     Differential diagnosis, natural history, supportive care, and indications for immediate follow-up discussed at length.     Nasal saline, decongestant, nasal CS

## 2020-08-05 ENCOUNTER — HOSPITAL ENCOUNTER (OUTPATIENT)
Dept: LAB | Facility: MEDICAL CENTER | Age: 30
End: 2020-08-05
Attending: OBSTETRICS & GYNECOLOGY
Payer: COMMERCIAL

## 2020-08-05 LAB — CYTOLOGY REG CYTOL: NORMAL

## 2020-08-05 PROCEDURE — 88175 CYTOPATH C/V AUTO FLUID REDO: CPT

## 2021-01-16 ENCOUNTER — HOSPITAL ENCOUNTER (EMERGENCY)
Facility: MEDICAL CENTER | Age: 31
End: 2021-01-16
Attending: EMERGENCY MEDICINE
Payer: COMMERCIAL

## 2021-01-16 ENCOUNTER — OFFICE VISIT (OUTPATIENT)
Dept: URGENT CARE | Facility: PHYSICIAN GROUP | Age: 31
End: 2021-01-16
Payer: COMMERCIAL

## 2021-01-16 ENCOUNTER — APPOINTMENT (OUTPATIENT)
Dept: RADIOLOGY | Facility: MEDICAL CENTER | Age: 31
End: 2021-01-16
Attending: EMERGENCY MEDICINE
Payer: COMMERCIAL

## 2021-01-16 VITALS
SYSTOLIC BLOOD PRESSURE: 142 MMHG | OXYGEN SATURATION: 100 % | TEMPERATURE: 100.8 F | RESPIRATION RATE: 16 BRPM | WEIGHT: 132 LBS | BODY MASS INDEX: 24.29 KG/M2 | HEIGHT: 62 IN | DIASTOLIC BLOOD PRESSURE: 70 MMHG | HEART RATE: 115 BPM

## 2021-01-16 VITALS
HEIGHT: 62 IN | RESPIRATION RATE: 14 BRPM | OXYGEN SATURATION: 97 % | BODY MASS INDEX: 23.94 KG/M2 | TEMPERATURE: 98 F | DIASTOLIC BLOOD PRESSURE: 76 MMHG | WEIGHT: 130.07 LBS | HEART RATE: 86 BPM | SYSTOLIC BLOOD PRESSURE: 115 MMHG

## 2021-01-16 DIAGNOSIS — R50.9 FEVER, UNSPECIFIED FEVER CAUSE: ICD-10-CM

## 2021-01-16 DIAGNOSIS — R10.9 ABDOMINAL PAIN, UNSPECIFIED ABDOMINAL LOCATION: ICD-10-CM

## 2021-01-16 DIAGNOSIS — N83.201 CYST OF RIGHT OVARY: ICD-10-CM

## 2021-01-16 LAB
ALBUMIN SERPL BCP-MCNC: 4.7 G/DL (ref 3.2–4.9)
ALBUMIN/GLOB SERPL: 1.8 G/DL
ALP SERPL-CCNC: 75 U/L (ref 30–99)
ALT SERPL-CCNC: 21 U/L (ref 2–50)
ANION GAP SERPL CALC-SCNC: 14 MMOL/L (ref 7–16)
APPEARANCE UR: CLEAR
APPEARANCE UR: NORMAL
AST SERPL-CCNC: 26 U/L (ref 12–45)
BASOPHILS # BLD AUTO: 0.9 % (ref 0–1.8)
BASOPHILS # BLD: 0.07 K/UL (ref 0–0.12)
BILIRUB SERPL-MCNC: 0.2 MG/DL (ref 0.1–1.5)
BILIRUB UR QL STRIP.AUTO: NEGATIVE
BILIRUB UR STRIP-MCNC: NEGATIVE MG/DL
BUN SERPL-MCNC: 11 MG/DL (ref 8–22)
CALCIUM SERPL-MCNC: 9.1 MG/DL (ref 8.4–10.2)
CHLORIDE SERPL-SCNC: 105 MMOL/L (ref 96–112)
CO2 SERPL-SCNC: 21 MMOL/L (ref 20–33)
COLOR UR AUTO: NORMAL
COLOR UR: YELLOW
COMMENT 1642: NORMAL
CREAT SERPL-MCNC: 0.55 MG/DL (ref 0.5–1.4)
EOSINOPHIL # BLD AUTO: 0.34 K/UL (ref 0–0.51)
EOSINOPHIL NFR BLD: 4.3 % (ref 0–6.9)
ERYTHROCYTE [DISTWIDTH] IN BLOOD BY AUTOMATED COUNT: 44.3 FL (ref 35.9–50)
GLOBULIN SER CALC-MCNC: 2.6 G/DL (ref 1.9–3.5)
GLUCOSE SERPL-MCNC: 84 MG/DL (ref 65–99)
GLUCOSE UR STRIP.AUTO-MCNC: NEGATIVE MG/DL
GLUCOSE UR STRIP.AUTO-MCNC: NEGATIVE MG/DL
HCG SERPL QL: NEGATIVE
HCT VFR BLD AUTO: 32.6 % (ref 37–47)
HGB BLD-MCNC: 9.6 G/DL (ref 12–16)
HYPOCHROMIA BLD QL SMEAR: ABNORMAL
IMM GRANULOCYTES # BLD AUTO: 0.01 K/UL (ref 0–0.11)
IMM GRANULOCYTES NFR BLD AUTO: 0.1 % (ref 0–0.9)
INT CON NEG: NEGATIVE
INT CON POS: POSITIVE
KETONES UR STRIP.AUTO-MCNC: NEGATIVE MG/DL
KETONES UR STRIP.AUTO-MCNC: NEGATIVE MG/DL
LEUKOCYTE ESTERASE UR QL STRIP.AUTO: NEGATIVE
LEUKOCYTE ESTERASE UR QL STRIP.AUTO: NEGATIVE
LIPASE SERPL-CCNC: 22 U/L (ref 7–58)
LYMPHOCYTES # BLD AUTO: 1.94 K/UL (ref 1–4.8)
LYMPHOCYTES NFR BLD: 24.3 % (ref 22–41)
MCH RBC QN AUTO: 22.3 PG (ref 27–33)
MCHC RBC AUTO-ENTMCNC: 29.4 G/DL (ref 33.6–35)
MCV RBC AUTO: 75.8 FL (ref 81.4–97.8)
MICRO URNS: NORMAL
MONOCYTES # BLD AUTO: 0.57 K/UL (ref 0–0.85)
MONOCYTES NFR BLD AUTO: 7.1 % (ref 0–13.4)
NEUTROPHILS # BLD AUTO: 5.06 K/UL (ref 2–7.15)
NEUTROPHILS NFR BLD: 63.3 % (ref 44–72)
NITRITE UR QL STRIP.AUTO: NEGATIVE
NITRITE UR QL STRIP.AUTO: NEGATIVE
NRBC # BLD AUTO: 0 K/UL
NRBC BLD-RTO: 0 /100 WBC
PH UR STRIP.AUTO: 5.5 [PH] (ref 5–8)
PH UR STRIP.AUTO: 5.5 [PH] (ref 5–8)
PLATELET # BLD AUTO: 332 K/UL (ref 164–446)
PLATELET BLD QL SMEAR: NORMAL
PMV BLD AUTO: 9.4 FL (ref 9–12.9)
POC URINE PREGNANCY TEST: NEGATIVE
POTASSIUM SERPL-SCNC: 4.5 MMOL/L (ref 3.6–5.5)
PROT SERPL-MCNC: 7.3 G/DL (ref 6–8.2)
PROT UR QL STRIP: NEGATIVE MG/DL
PROT UR QL STRIP: NEGATIVE MG/DL
RBC # BLD AUTO: 4.3 M/UL (ref 4.2–5.4)
RBC BLD AUTO: PRESENT
RBC UR QL AUTO: NEGATIVE
RBC UR QL AUTO: NEGATIVE
SODIUM SERPL-SCNC: 140 MMOL/L (ref 135–145)
SP GR UR STRIP.AUTO: 1.01
SP GR UR STRIP.AUTO: <=1.005
UROBILINOGEN UR STRIP-MCNC: 0.2 MG/DL
WBC # BLD AUTO: 8 K/UL (ref 4.8–10.8)

## 2021-01-16 PROCEDURE — 99284 EMERGENCY DEPT VISIT MOD MDM: CPT

## 2021-01-16 PROCEDURE — 84703 CHORIONIC GONADOTROPIN ASSAY: CPT

## 2021-01-16 PROCEDURE — 85025 COMPLETE CBC W/AUTO DIFF WBC: CPT

## 2021-01-16 PROCEDURE — 76856 US EXAM PELVIC COMPLETE: CPT

## 2021-01-16 PROCEDURE — 80053 COMPREHEN METABOLIC PANEL: CPT

## 2021-01-16 PROCEDURE — 81025 URINE PREGNANCY TEST: CPT | Performed by: PHYSICIAN ASSISTANT

## 2021-01-16 PROCEDURE — 99215 OFFICE O/P EST HI 40 MIN: CPT | Performed by: PHYSICIAN ASSISTANT

## 2021-01-16 PROCEDURE — 83690 ASSAY OF LIPASE: CPT

## 2021-01-16 PROCEDURE — 81002 URINALYSIS NONAUTO W/O SCOPE: CPT | Performed by: PHYSICIAN ASSISTANT

## 2021-01-16 PROCEDURE — 36415 COLL VENOUS BLD VENIPUNCTURE: CPT

## 2021-01-16 PROCEDURE — 81003 URINALYSIS AUTO W/O SCOPE: CPT

## 2021-01-16 SDOH — HEALTH STABILITY: MENTAL HEALTH: HOW MANY STANDARD DRINKS CONTAINING ALCOHOL DO YOU HAVE ON A TYPICAL DAY?: 1 OR 2

## 2021-01-16 SDOH — HEALTH STABILITY: MENTAL HEALTH: HOW OFTEN DO YOU HAVE A DRINK CONTAINING ALCOHOL?: MONTHLY OR LESS

## 2021-01-16 ASSESSMENT — ENCOUNTER SYMPTOMS
ABDOMINAL PAIN: 1
FEVER: 0
VOMITING: 0
CHILLS: 1
BLOOD IN STOOL: 0
NAUSEA: 1
SHORTNESS OF BREATH: 0
DIARRHEA: 0
CONSTIPATION: 0
FLANK PAIN: 0

## 2021-01-16 ASSESSMENT — PAIN DESCRIPTION - DESCRIPTORS: DESCRIPTORS: ACHING;PRESSURE

## 2021-01-16 ASSESSMENT — FIBROSIS 4 INDEX
FIB4 SCORE: 0.51
FIB4 SCORE: 0.51

## 2021-01-16 NOTE — PROGRESS NOTES
Subjective:   Concetta Hargrove is a 30 y.o. female who presents for Abdominal Pain (Pt reports lower abd pain, lower back pain, dysuria, bloating, chills, nausea. Onset 24 hours.   )        This is a new problem.  Patient presents with concerns of acutely worsening abdominal and  back pain over the last 24 hours.  She states that she has been having intermittent right lower quadrant and pelvic pain over the last month.  Symptoms wax and wane.  Over the last 24 hours she developed worsening pain, dysuria, bloating, chills, and nausea.  She denies vomiting and fevers.  No diarrhea or constipation.  Denies hematuria, urinary frequency, urinary urgency, flank pain, cloudy urine.  She is not straining with bowel movements.  She does have history of right-sided tubo-ovarian abscess and states that current symptoms are identical to symptoms she had earlier on-2019.  No history of diverticulitis.  No known Covid exposure and no respiratory symptoms.  She is not taking any medications for symptoms.  No aggravating or alleviating factors.  Denies possible pregnancy-  had vasectomy.  Past surgical history significant for 2 x  and appendectomy.    Review of Systems   Constitutional: Positive for chills and malaise/fatigue. Negative for fever.   Respiratory: Negative for shortness of breath.    Gastrointestinal: Positive for abdominal pain and nausea. Negative for blood in stool, constipation, diarrhea, melena and vomiting.   Genitourinary: Positive for dysuria. Negative for flank pain, frequency, hematuria and urgency.       PMH:  has no past medical history of Asthma or Type II or unspecified type diabetes mellitus without mention of complication, not stated as uncontrolled.  MEDS:   Current Outpatient Medications:   •  fluconazole (DIFLUCAN) 150 MG tablet, Take 1 Tab by mouth every day. (Patient not taking: Reported on 2021), Disp: 2 Tab, Rfl: 0  •  metroNIDAZOLE (FLAGYL) 500 MG Tab, Take one 500 mg  "tab by mouth every 12 hours for 7 days (Patient not taking: Reported on 6/22/2020), Disp: 14 Tab, Rfl: 0  ALLERGIES:   Allergies   Allergen Reactions   • Ceclor [Cefaclor] Unspecified     Stopped breathing  Tolerated ceftriaxone 4/2018    • Hydrocodone Vomiting     Tolerated oxycodone - 2018   • Penicillins      Skin rash.     SURGHX:   Past Surgical History:   Procedure Laterality Date   • REPEAT C SECTION N/A 3/28/2018    Procedure: REPEAT C SECTION;  Surgeon: Bernie Barriga M.D.;  Location: LABOR AND DELIVERY;  Service: Labor and Delivery   • PRIMARY C SECTION  7/6/2015    Procedure: PRIMARY C SECTION;  Surgeon: Bernie Barriga M.D.;  Location: LABOR AND DELIVERY;  Service:    • ABDOMINAL EXPLORATION     • APPENDECTOMY     • TONSILLECTOMY       SOCHX:  reports that she has never smoked. She has never used smokeless tobacco. She reports current alcohol use. She reports that she does not use drugs.  FH: Family history was reviewed, no pertinent findings to report   Objective:   /70 (BP Location: Left arm, Patient Position: Sitting, BP Cuff Size: Adult)   Pulse (!) 115   Temp (!) 38.2 °C (100.8 °F) (Temporal)   Resp 16   Ht 1.575 m (5' 2\")   Wt 59.9 kg (132 lb)   SpO2 100%   BMI 24.14 kg/m²   Physical Exam  Vitals signs reviewed.   Constitutional:       General: She is not in acute distress.     Appearance: Normal appearance. She is well-developed. She is not toxic-appearing.   HENT:      Head: Normocephalic and atraumatic.      Right Ear: External ear normal.      Left Ear: External ear normal.      Nose: Nose normal.   Eyes:      General: Gaze aligned appropriately.   Neck:      Musculoskeletal: Neck supple.   Cardiovascular:      Rate and Rhythm: Normal rate and regular rhythm.   Pulmonary:      Effort: Pulmonary effort is normal. No respiratory distress.      Breath sounds: No stridor.   Abdominal:      General: Abdomen is flat. Bowel sounds are increased.      Palpations: Abdomen is soft.      " Tenderness: There is abdominal tenderness in the right lower quadrant and suprapubic area. There is no right CVA tenderness, left CVA tenderness, guarding or rebound. Negative signs include Hammonds's sign.   Skin:     General: Skin is warm and dry.      Capillary Refill: Capillary refill takes less than 2 seconds.   Neurological:      Mental Status: She is alert and oriented to person, place, and time.      Comments: CN2-12 grossly intact   Psychiatric:         Speech: Speech normal.         Behavior: Behavior normal.           Results for orders placed or performed in visit on 01/16/21   POCT Urinalysis   Result Value Ref Range    POC Color LIGHT YELLOW Negative    POC Appearance TURBID Negative    POC Leukocyte Esterase NEGATIVE Negative    POC Nitrites NEGATIVE Negative    POC Urobiligen 0.2 Negative (0.2) mg/dL    POC Protein NEGATIVE Negative mg/dL    POC Urine PH 5.5 5.0 - 8.0    POC Blood NEGATIVE Negative    POC Specific Gravity 1.010 <1.005 - >1.030    POC Ketones NEGATIVE Negative mg/dL    POC Bilirubin NEGATIVE Negative mg/dL    POC Glucose NEGATIVE Negative mg/dL   POCT Pregnancy   Result Value Ref Range    POC Urine Pregnancy Test NEGATIVE Negative    Internal Control Positive Positive     Internal Control Negative Negative        Assessment/Plan:   1. Abdominal pain, unspecified abdominal location  - POCT Urinalysis  - POCT Pregnancy    2. Fever, unspecified fever cause    Patient records reviewed.  Patient presented to urgent care with similar symptoms on 6/14/2019.  She had a low-grade fever and was also tachycardic at this time.  Lower abdomen was somewhat tender to palpation, but no guarding or peritoneal signs.  CT was remarkable for tubo-ovarian abscess and she was subsequently sent to the ED and admitted for treatment.    UA WNL. Patient is currently tachycardic with low-grade fever.  Patient has right lower quadrant and suprapubic tenderness, but no peritoneal signs.  Clinical presentation is  nearly identical to prior presentation.  Patient needs labs and imaging to further evaluate.  Unable to obtain outpatient CT. I recommend patient receive additional work-up at a higher level of care.  Patient is amenable to this and will go to [facility] emergency department for further evaluation.      Differential diagnosis, natural history, supportive care, and indications for immediate follow-up discussed.

## 2021-01-17 NOTE — ED NOTES
Patient cleared for discharge. No noted acute distress. Patient ambulatory to discharge without complications. Patient verbalized understanding of self care and follow up care at home.

## 2021-01-17 NOTE — ED TRIAGE NOTES
"Pt is referred to our facility from [facility] for further evaluation of RLQ abdominal pain referred to her back, with episodic nausea recurring for the past 24 hours.    Chief Complaint   Patient presents with   • RLQ Pain   • Flank Pain   /89   Pulse 98   Temp 36.5 °C (97.7 °F) (Oral)   Resp 18   Ht 1.575 m (5' 2\")   Wt 59 kg (130 lb 1.1 oz)   LMP 12/28/2020 (Approximate)   SpO2 100%   BMI 23.79 kg/m²         "

## 2021-01-17 NOTE — ED PROVIDER NOTES
ED Provider Note    ER Provider Note         CHIEF COMPLAINT  Chief Complaint   Patient presents with   • RLQ Pain   • Flank Pain       HPI  Concetta Hargrove is a 30 y.o. female who presents to the Emergency Department who presents with right lower quadrant pain as well as right flank pain.  The patient has a history of appendectomy.  The patient says that the pain has been migrating from the right to her left flank and then back down again.  She said it is mild to moderate.  She describes it as a pressure.  She denies any diarrhea or vaginal discharge or bleeding.  She does have a history of TOA.  She denies any headaches or chest pain.  She denies any fevers or dysuria.    REVIEW OF SYSTEMS  See HPI for further details. All other systems are negative.     PAST MEDICAL HISTORY       SURGICAL HISTORY   has a past surgical history that includes abdominal exploration; tonsillectomy; appendectomy; primary c section (7/6/2015); and repeat c section (N/A, 3/28/2018).    SOCIAL HISTORY  Social History     Tobacco Use   • Smoking status: Never Smoker   • Smokeless tobacco: Never Used   Substance Use Topics   • Alcohol use: Yes     Frequency: Monthly or less     Drinks per session: 1 or 2     Comment: Occasionally   • Drug use: No      Social History     Substance and Sexual Activity   Drug Use No       FAMILY HISTORY  Family History   Problem Relation Age of Onset   • Hypertension Mother    • Breast Cancer Mother 33   • Cancer Mother 33        Uterine. Full hysterectomy 35.   • Hypertension Maternal Grandmother    • Breast Cancer Maternal Grandmother    • Cancer Maternal Grandmother         Uterine CA. Full hysterectomy.    • Other Sister         Ovarian Cyst   • No Known Problems Daughter    • No Known Problems Daughter        CURRENT MEDICATIONS  Home Medications    **Home medications have not yet been reviewed for this encounter**         ALLERGIES  Allergies   Allergen Reactions   • Ceclor [Cefaclor] Unspecified  "    Stopped breathing  Tolerated ceftriaxone 4/2018    • Hydrocodone Vomiting     Tolerated oxycodone - 2018   • Penicillins      Skin rash.       PHYSICAL EXAM  VITAL SIGNS: /76   Pulse 86   Temp 36.7 °C (98 °F) (Oral)   Resp 14   Ht 1.575 m (5' 2\")   Wt 59 kg (130 lb 1.1 oz)   LMP 12/28/2020 (Approximate)   SpO2 97%   BMI 23.79 kg/m²      Constitutional: Alert in no apparent distress.  HENT: No signs of trauma, Bilateral external ears normal, Nose normal.   Eyes: Pupils are equal and reactive, Conjunctiva normal, Non-icteric.   Neck: Normal range of motion, No tenderness, Supple, No stridor.   Lymphatic: No lymphadenopathy noted.   Cardiovascular: Regular rate and rhythm, nondisplaced PMI  Thorax & Lungs: No respiratory distress,  No chest tenderness.   Abdomen: Bowel sounds normal, Soft, mild rlq tenderness, No masses, No pulsatile masses. No peritoneal signs.  Skin: Warm, Dry, No erythema, No rash.   Back: No bony tenderness, No CVA tenderness.   Extremities: Intact distal pulses, No edema, No tenderness, No cyanosis.  Musculoskeletal: Good range of motion in all major joints. No tenderness to palpation or major deformities noted.   Neurologic: Alert , Normal motor function, Normal sensory function, No focal deficits noted.   Psychiatric: Affect normal, Judgment normal, Mood normal.     DIAGNOSTIC STUDIES / PROCEDURES        LABS  Labs Reviewed   CBC WITH DIFFERENTIAL - Abnormal; Notable for the following components:       Result Value    Hemoglobin 9.6 (*)     Hematocrit 32.6 (*)     MCV 75.8 (*)     MCH 22.3 (*)     MCHC 29.4 (*)     All other components within normal limits   COMP METABOLIC PANEL   LIPASE   URINALYSIS,CULTURE IF INDICATED    Narrative:     Indication for culture:->Unexplained new onset of Flank pain  and/or Costovertebral angle tenderness   HCG QUAL SERUM   ESTIMATED GFR   PLATELET ESTIMATE   MORPHOLOGY   DIFFERENTIAL COMMENT       All labs reviewed by " me.    RADIOLOGY  US-PELVIC COMPLETE (TRANSABDOMINAL/TRANSVAGINAL) (COMBO)   Final Result      3.14 x 2.64 x 2.52 cm heterogeneous lesion along the right ovary may represent an exophytic complex cystic lesion or involuting hemorrhagic cyst. Follow-up pelvic ultrasound in 6 weeks is recommended.      Small amount of free fluid in the pelvis.      Uterine fibroid.      Thickened endometrial stripe measuring 1.99 cm.           The radiologist's interpretation of all radiological studies have been reviewed by me.    COURSE & MEDICAL DECISION MAKING  Pertinent Labs & Imaging studies reviewed. (See chart for details)    This is a 30 y.o. female that presents with right lower quadrant pain.  This could resent torsion versus ruptured cyst versus TOA.  CT is not indicated given there is no appendix.  We will get an ultrasound of the patient's pelvic area as well as evaluate for urinary tract infection for, as well as evaluate for hepatitis and pancreatitis.  We will reassess after this..     6:40 PM - Patient seen and examined at bedside.         Patient was found to have a hemorrhagic cyst in the right ovary.  Patient is not pregnant.  The patient has a slightly low hemoglobin 9.6.  She has no significant electrolyte derangements and normal LFTs.  At this time we will discharge the patient home with follow-up in 6 weeks for repeat ultrasound.  I will give her strict return precautions and follow-up.    FINAL IMPRESSION  1. Cyst of right ovary              Electronically signed by: Taqueria Rodriguez M.D., 1/16/2021

## 2021-02-17 ENCOUNTER — OFFICE VISIT (OUTPATIENT)
Dept: MEDICAL GROUP | Facility: PHYSICIAN GROUP | Age: 31
End: 2021-02-17
Payer: COMMERCIAL

## 2021-02-17 VITALS
TEMPERATURE: 99.3 F | WEIGHT: 134.8 LBS | HEART RATE: 103 BPM | HEIGHT: 62 IN | SYSTOLIC BLOOD PRESSURE: 120 MMHG | OXYGEN SATURATION: 99 % | BODY MASS INDEX: 24.8 KG/M2 | DIASTOLIC BLOOD PRESSURE: 60 MMHG | RESPIRATION RATE: 16 BRPM

## 2021-02-17 DIAGNOSIS — N83.201 RIGHT OVARIAN CYST: ICD-10-CM

## 2021-02-17 DIAGNOSIS — R00.0 TACHYCARDIA: ICD-10-CM

## 2021-02-17 PROBLEM — N70.93 TUBO-OVARIAN ABSCESS: Status: RESOLVED | Noted: 2019-06-14 | Resolved: 2021-02-17

## 2021-02-17 PROBLEM — A41.9 SEPSIS (HCC): Status: RESOLVED | Noted: 2020-02-01 | Resolved: 2021-02-17

## 2021-02-17 PROBLEM — A46 ERYSIPELAS: Status: RESOLVED | Noted: 2020-02-01 | Resolved: 2021-02-17

## 2021-02-17 PROCEDURE — 99214 OFFICE O/P EST MOD 30 MIN: CPT | Performed by: PHYSICIAN ASSISTANT

## 2021-02-17 RX ORDER — PROPRANOLOL HYDROCHLORIDE 10 MG/1
10 TABLET ORAL 3 TIMES DAILY
Qty: 90 TABLET | Refills: 1 | Status: SHIPPED | OUTPATIENT
Start: 2021-02-17 | End: 2021-03-03

## 2021-02-17 ASSESSMENT — FIBROSIS 4 INDEX: FIB4 SCORE: 0.55

## 2021-02-17 ASSESSMENT — PATIENT HEALTH QUESTIONNAIRE - PHQ9: CLINICAL INTERPRETATION OF PHQ2 SCORE: 0

## 2021-02-18 NOTE — PROGRESS NOTES
Chief Complaint   Patient presents with   • Follow-Up     ER        HISTORY OF PRESENT ILLNESS: Concetta Hargrove is an established 30 y.o. female here to discuss the evaluation and management of:    Patient is a pleasant 30-year-old female here today follow-up on ER visit on 1/16/2021.  She is seen in the ER for right leg pain and was diagnosed with a right ovarian cyst.  Reviewed pelvic ultrasound results with patient.  It was recommended that she get a repeat pelvic ultrasound in 6 weeks.  States she reached out to her OB/GYN and she tells me that her OB/GYN did not feel that she needed to repeat the pelvic ultrasound.  Patient states she still lower quadrant pain symptoms.  RLQ is non tender to palpation.  Patient states her mother and maternal grandmother both had uterine cancer.  Patient admits that she does have anxiety and feels her anxiety symptoms worsened after the passing of her mom in August 2020.   Patient's heart rate is 103 during today's appointment.  In reviewing patient medical records it appears that her heart rate is often above 100 bpm.  Patient is unsure if her heart rate is consistently elevated at home but she feels frequently her heart racing.  She is asymptomatic is unsure if it is related to anxiety.    Patient denies having abnormal vaginal discharge, dysuria, hematuria, urgency, frequency, nausea, vomiting, fever, chills, lymphadenopathy.      There are no problems to display for this patient.      Allergies:Ceclor [cefaclor], Hydrocodone, and Penicillins    Current Outpatient Medications   Medication Sig Dispense Refill   • propranolol (INDERAL) 10 MG Tab Take 1 tablet by mouth 3 times a day. 90 tablet 1     No current facility-administered medications for this visit.       Social History     Tobacco Use   • Smoking status: Never Smoker   • Smokeless tobacco: Never Used   Substance Use Topics   • Alcohol use: Yes     Comment: Occasionally   • Drug use: No       Family Status  "  Relation Name Status   • Mo  Alive   • Fa  Alive   • MGMo  Alive   • Sis  Alive   • Fletcher  Alive   • Fletcher  Alive     Family History   Problem Relation Age of Onset   • Hypertension Mother    • Breast Cancer Mother 33   • Cancer Mother 33        Uterine. Full hysterectomy 35.   • Hypertension Maternal Grandmother    • Breast Cancer Maternal Grandmother    • Cancer Maternal Grandmother         Uterine CA. Full hysterectomy.    • Other Sister         Ovarian Cyst   • No Known Problems Daughter    • No Known Problems Daughter        ROS:  Review of Systems   Constitutional: Negative for fever, chills, weight loss and malaise/fatigue.   HENT: Negative for ear pain, nosebleeds, congestion, sore throat and neck pain.    Eyes: Negative for blurred vision.   Respiratory: Negative for cough, sputum production, shortness of breath and wheezing.    Cardiovascular: Negative for chest pain, palpitations, orthopnea and leg swelling.   Gastrointestinal: Negative for heartburn, nausea, vomiting. + for abdominal pain.   Genitourinary: Negative for dysuria, urgency and frequency.   Musculoskeletal: Negative for myalgias, back pain and joint pain.   Skin: Negative for rash and itching.   Neurological: Negative for dizziness, tingling, tremors, sensory change, focal weakness and headaches.   Endo/Heme/Allergies: Does not bruise/bleed easily.   Psychiatric/Behavioral: Negative for depression, suicidal ideas and memory loss.  The patient is not nervous/anxious and does not have insomnia.    All other systems reviewed and are negative except as in HPI.    Exam: /60 (BP Location: Left arm, Patient Position: Sitting, BP Cuff Size: Adult)   Pulse (!) 103   Temp 37.4 °C (99.3 °F) (Temporal)   Resp 16   Ht 1.575 m (5' 2\")   Wt 61.1 kg (134 lb 12.8 oz)   SpO2 99%  Body mass index is 24.66 kg/m².  General: Normal appearing. No distress.  HEENT: Normocephalic. Eyes conjunctiva clear lids without ptosis, ears normal shape and " contour.  Neck: Supple without JVD. Thyroid is not enlarged.  Pulmonary: Clear to ausculation.  Normal effort. No rales, ronchi, or wheezing.  Cardiovascular: Regular rhythm without murmur.  Positive for tachycardia.  Abdomen: Soft, nontender, nondistended. Normal bowel sounds. Liver and spleen are not palpable  Neurologic: Grossly nonfocal.  Cranial nerves are normal.   Skin: Warm and dry.  No rashes or suspicious skin lesions.  Musculoskeletal: Normal gait. No extremity cyanosis, clubbing, or edema.  Psych: Normal mood and affect. Alert and oriented x3. Judgment and insight is normal.    Medical decision-making and discussion:  1. Right ovarian cyst  Pelvic ultrasound has been ordered.  Advised patient to complete pelvic ultrasound in 2-4 weeks for evaluation.  Patient will follow up after ultrasound has been completed.  Discussed ED precautions with patient.  Continue over-the-counter conservative treatment options.  Continue to monitor.    - US-PELVIC COMPLETE (TRANSABDOMINAL/TRANSVAGINAL) (COMBO); Future    2. Tachycardia  As a child work has been ordered.  Patient be contacted with results.  We will discuss lab work results during follow-up appointment in 1 month.  Reviewed patient's medical charts it appears pulse has been elevated on several occasions.  Patient is great.  Discussed common side effects and adverse reaction to medication patient.  Advised patient to start with propanolol 10 mg tab once daily.  Discussed with patient she can stop to 3 times daily.  Advised patient to monitor pulse rate at home.  Showed patient how to manually monitor pulse rate.  Patient follow-up in 1 month reevaluation.  - TSH WITH REFLEX TO FT4; Future  - propranolol (INDERAL) 10 MG Tab; Take 1 tablet by mouth 3 times a day.  Dispense: 90 tablet; Refill: 1          Please note that this dictation was created using voice recognition software. I have made every reasonable attempt to correct obvious errors, but I expect that  there are errors of grammar and possibly content that I did not discover before finalizing the note.    Assessment/Plan:  1. Right ovarian cyst  US-PELVIC COMPLETE (TRANSABDOMINAL/TRANSVAGINAL) (COMBO)   2. Tachycardia  TSH WITH REFLEX TO FT4    propranolol (INDERAL) 10 MG Tab   3. Need for vaccination  CANCELED: Influenza Vaccine Quad Injection (PF)       Return in about 1 month (around 3/17/2021).

## 2021-11-18 ENCOUNTER — OFFICE VISIT (OUTPATIENT)
Dept: URGENT CARE | Facility: PHYSICIAN GROUP | Age: 31
End: 2021-11-18
Payer: COMMERCIAL

## 2021-11-18 ENCOUNTER — HOSPITAL ENCOUNTER (OUTPATIENT)
Facility: MEDICAL CENTER | Age: 31
End: 2021-11-18
Attending: FAMILY MEDICINE
Payer: COMMERCIAL

## 2021-11-18 VITALS
HEART RATE: 98 BPM | OXYGEN SATURATION: 98 % | TEMPERATURE: 98.9 F | BODY MASS INDEX: 23.74 KG/M2 | RESPIRATION RATE: 16 BRPM | HEIGHT: 62 IN | WEIGHT: 129 LBS | SYSTOLIC BLOOD PRESSURE: 110 MMHG | DIASTOLIC BLOOD PRESSURE: 84 MMHG

## 2021-11-18 DIAGNOSIS — J98.8 RTI (RESPIRATORY TRACT INFECTION): ICD-10-CM

## 2021-11-18 LAB
EXTERNAL QUALITY CONTROL: NORMAL
FLUAV+FLUBV AG SPEC QL IA: NEGATIVE
INT CON NEG: NEGATIVE
INT CON POS: POSITIVE
SARS-COV+SARS-COV-2 AG RESP QL IA.RAPID: NEGATIVE

## 2021-11-18 PROCEDURE — 87426 SARSCOV CORONAVIRUS AG IA: CPT | Performed by: FAMILY MEDICINE

## 2021-11-18 PROCEDURE — 99214 OFFICE O/P EST MOD 30 MIN: CPT | Performed by: FAMILY MEDICINE

## 2021-11-18 PROCEDURE — 87804 INFLUENZA ASSAY W/OPTIC: CPT | Performed by: FAMILY MEDICINE

## 2021-11-18 PROCEDURE — 0240U HCHG SARS-COV-2 COVID-19 NFCT DS RESP RNA 3 TRGT MIC: CPT

## 2021-11-18 ASSESSMENT — FIBROSIS 4 INDEX: FIB4 SCORE: 0.56

## 2021-11-18 ASSESSMENT — ENCOUNTER SYMPTOMS
FEVER: 1
MYALGIAS: 1

## 2021-11-18 NOTE — LETTER
November 18, 2021         Patient: Concetta Hargrove   YOB: 1990   Date of Visit: 11/18/2021           To Whom it May Concern:    Concetta Hargrove was seen in my clinic on 11/18/2021. She may return to work in 2-3 days.    If you have any questions or concerns, please don't hesitate to call.        Sincerely,           Greyson Loja M.D.  Electronically Signed

## 2021-11-18 NOTE — PROGRESS NOTES
"Subjective     Concetta Hargrove is a 31 y.o. female who presents with Nasal Congestion (Bilateral ear pain, fevers of 101.3 this morning. Onset 1 day. )      - This is a pleasant and nontoxic appearing 31 y.o. female with c/o 2 days w/ stuffy nose, ears hurt, some aches-malaise and fever today. No cough or sore throat. No NV, no diarrhea.       ALLERGIES:  Ceclor [cefaclor], Hydrocodone, and Penicillins     PMH:  History reviewed. No pertinent past medical history.     PSH:  Past Surgical History:   Procedure Laterality Date   • REPEAT C SECTION N/A 3/28/2018    Procedure: REPEAT C SECTION;  Surgeon: Bernie Barriga M.D.;  Location: LABOR AND DELIVERY;  Service: Labor and Delivery   • PRIMARY C SECTION  7/6/2015    Procedure: PRIMARY C SECTION;  Surgeon: Bernie Barriga M.D.;  Location: LABOR AND DELIVERY;  Service:    • ABDOMINAL EXPLORATION     • APPENDECTOMY     • TONSILLECTOMY         MEDS:  No current outpatient medications on file.    ** I have documented what I find to be significant in regards to past medical, social, family and surgical history  in my HPI or under PMH/PSH/FH review section, otherwise it is noncontributory **           HPI    Review of Systems   Constitutional: Positive for fever and malaise/fatigue.   HENT: Positive for congestion and ear pain.    Musculoskeletal: Positive for myalgias.   All other systems reviewed and are negative.             Objective     /84 (BP Location: Left arm, Patient Position: Sitting, BP Cuff Size: Adult)   Pulse 98   Temp 37.2 °C (98.9 °F) (Temporal)   Resp 16   Ht 1.575 m (5' 2\")   Wt 58.5 kg (129 lb)   SpO2 98%   BMI 23.59 kg/m²      Physical Exam  Vitals and nursing note reviewed.   Constitutional:       General: She is not in acute distress.     Appearance: Normal appearance. She is well-developed.   HENT:      Head: Normocephalic and atraumatic.      Right Ear: Tympanic membrane, ear canal and external ear normal.      Left Ear: Tympanic " membrane, ear canal and external ear normal.      Nose: Congestion present.      Mouth/Throat:      Mouth: Mucous membranes are moist.      Pharynx: Oropharynx is clear. No oropharyngeal exudate or posterior oropharyngeal erythema.   Eyes:      General: No scleral icterus.  Cardiovascular:      Heart sounds: Normal heart sounds. No murmur heard.      Pulmonary:      Effort: Pulmonary effort is normal. No respiratory distress.      Breath sounds: Normal breath sounds.   Skin:     Coloration: Skin is not jaundiced or pale.   Neurological:      Mental Status: She is alert.      Motor: No abnormal muscle tone.   Psychiatric:         Mood and Affect: Mood normal.         Behavior: Behavior normal.       Assessment & Plan       1. RTI (respiratory tract infection)  CoV-2 and Flu A/B by PCR (24 hour In-House): Collect NP swab in VTM    POCT SARS-COV Antigen ZAN (Symptomatic Only)    POCT Influenza A/B     * viral illnes    - Dx, plan & d/c instructions discussed   - Rest, stay hydrated, hot tea/honey a few times/day, OTC Aleve-D  - E.R. precautions discussed     Asked to kindly follow up with their PCP's office in 2-3 days for a recheck, ER if not improving or feeling/getting worse.    Any realistic side effects of medications that may have been given today reviewed.     Patient left in stable condition     POCT results reviewed/discussed

## 2021-11-19 LAB
FLUAV RNA SPEC QL NAA+PROBE: NEGATIVE
FLUBV RNA SPEC QL NAA+PROBE: NEGATIVE
SARS-COV-2 RNA RESP QL NAA+PROBE: NOTDETECTED
SPECIMEN SOURCE: NORMAL

## 2021-12-09 ENCOUNTER — HOSPITAL ENCOUNTER (OUTPATIENT)
Dept: LAB | Facility: MEDICAL CENTER | Age: 31
End: 2021-12-09
Attending: OBSTETRICS & GYNECOLOGY
Payer: COMMERCIAL

## 2021-12-09 LAB — CYTOLOGY REG CYTOL: NORMAL

## 2021-12-09 PROCEDURE — 87624 HPV HI-RISK TYP POOLED RSLT: CPT

## 2021-12-09 PROCEDURE — 88175 CYTOPATH C/V AUTO FLUID REDO: CPT

## 2021-12-15 ENCOUNTER — OFFICE VISIT (OUTPATIENT)
Dept: URGENT CARE | Facility: PHYSICIAN GROUP | Age: 31
End: 2021-12-15
Payer: COMMERCIAL

## 2021-12-15 ENCOUNTER — HOSPITAL ENCOUNTER (OUTPATIENT)
Facility: MEDICAL CENTER | Age: 31
End: 2021-12-15
Attending: NURSE PRACTITIONER
Payer: COMMERCIAL

## 2021-12-15 VITALS
DIASTOLIC BLOOD PRESSURE: 80 MMHG | BODY MASS INDEX: 23.74 KG/M2 | RESPIRATION RATE: 16 BRPM | OXYGEN SATURATION: 100 % | TEMPERATURE: 99.2 F | WEIGHT: 134 LBS | HEIGHT: 63 IN | SYSTOLIC BLOOD PRESSURE: 120 MMHG | HEART RATE: 108 BPM

## 2021-12-15 DIAGNOSIS — R39.9 UTI SYMPTOMS: ICD-10-CM

## 2021-12-15 DIAGNOSIS — N30.01 ACUTE CYSTITIS WITH HEMATURIA: ICD-10-CM

## 2021-12-15 LAB
APPEARANCE UR: NORMAL
BILIRUB UR STRIP-MCNC: NEGATIVE MG/DL
COLOR UR AUTO: YELLOW
GLUCOSE UR STRIP.AUTO-MCNC: NEGATIVE MG/DL
INT CON NEG: NORMAL
INT CON POS: NORMAL
KETONES UR STRIP.AUTO-MCNC: NEGATIVE MG/DL
LEUKOCYTE ESTERASE UR QL STRIP.AUTO: NORMAL
NITRITE UR QL STRIP.AUTO: NEGATIVE
PH UR STRIP.AUTO: 5.5 [PH] (ref 5–8)
POC URINE PREGNANCY TEST: NEGATIVE
PROT UR QL STRIP: 30 MG/DL
RBC UR QL AUTO: NORMAL
SP GR UR STRIP.AUTO: 1.02
UROBILINOGEN UR STRIP-MCNC: 0.2 MG/DL

## 2021-12-15 PROCEDURE — 87086 URINE CULTURE/COLONY COUNT: CPT

## 2021-12-15 PROCEDURE — 81002 URINALYSIS NONAUTO W/O SCOPE: CPT | Performed by: NURSE PRACTITIONER

## 2021-12-15 PROCEDURE — 87077 CULTURE AEROBIC IDENTIFY: CPT

## 2021-12-15 PROCEDURE — 81025 URINE PREGNANCY TEST: CPT | Performed by: NURSE PRACTITIONER

## 2021-12-15 PROCEDURE — 87186 SC STD MICRODIL/AGAR DIL: CPT

## 2021-12-15 PROCEDURE — 99214 OFFICE O/P EST MOD 30 MIN: CPT | Performed by: NURSE PRACTITIONER

## 2021-12-15 RX ORDER — SULFAMETHOXAZOLE AND TRIMETHOPRIM 800; 160 MG/1; MG/1
1 TABLET ORAL 2 TIMES DAILY
Qty: 6 TABLET | Refills: 0 | Status: SHIPPED | OUTPATIENT
Start: 2021-12-15 | End: 2021-12-18

## 2021-12-15 RX ORDER — PHENAZOPYRIDINE HYDROCHLORIDE 200 MG/1
200 TABLET, FILM COATED ORAL 3 TIMES DAILY PRN
Qty: 6 TABLET | Refills: 0 | Status: SHIPPED | OUTPATIENT
Start: 2021-12-15 | End: 2021-12-15

## 2021-12-15 RX ORDER — SULFAMETHOXAZOLE AND TRIMETHOPRIM 800; 160 MG/1; MG/1
1 TABLET ORAL 2 TIMES DAILY
Qty: 6 TABLET | Refills: 0 | Status: SHIPPED | OUTPATIENT
Start: 2021-12-15 | End: 2021-12-15

## 2021-12-15 RX ORDER — PHENAZOPYRIDINE HYDROCHLORIDE 200 MG/1
200 TABLET, FILM COATED ORAL 3 TIMES DAILY PRN
Qty: 6 TABLET | Refills: 0 | Status: SHIPPED | OUTPATIENT
Start: 2021-12-15 | End: 2021-12-17

## 2021-12-15 ASSESSMENT — ENCOUNTER SYMPTOMS
CONSTITUTIONAL NEGATIVE: 1
VOMITING: 0
BACK PAIN: 0
NAUSEA: 1
FLANK PAIN: 0
ABDOMINAL PAIN: 1
FEVER: 0

## 2021-12-15 ASSESSMENT — VISUAL ACUITY: OU: 1

## 2021-12-15 ASSESSMENT — FIBROSIS 4 INDEX: FIB4 SCORE: 0.56

## 2021-12-16 LAB
HPV HR 12 DNA CVX QL NAA+PROBE: POSITIVE
HPV16 DNA SPEC QL NAA+PROBE: NEGATIVE
HPV18 DNA SPEC QL NAA+PROBE: NEGATIVE
SPECIMEN SOURCE: ABNORMAL

## 2021-12-16 NOTE — PATIENT INSTRUCTIONS
Urinary Tract Infection, Adult    A urinary tract infection (UTI) is an infection of any part of the urinary tract. The urinary tract includes the kidneys, ureters, bladder, and urethra. These organs make, store, and get rid of urine in the body.  Your health care provider may use other names to describe the infection. An upper UTI affects the ureters and kidneys (pyelonephritis). A lower UTI affects the bladder (cystitis) and urethra (urethritis).  What are the causes?  Most urinary tract infections are caused by bacteria in your genital area, around the entrance to your urinary tract (urethra). These bacteria grow and cause inflammation of your urinary tract.  What increases the risk?  You are more likely to develop this condition if:  · You have a urinary catheter that stays in place (indwelling).  · You are not able to control when you urinate or have a bowel movement (you have incontinence).  · You are female and you:  ? Use a spermicide or diaphragm for birth control.  ? Have low estrogen levels.  ? Are pregnant.  · You have certain genes that increase your risk (genetics).  · You are sexually active.  · You take antibiotic medicines.  · You have a condition that causes your flow of urine to slow down, such as:  ? An enlarged prostate, if you are male.  ? Blockage in your urethra (stricture).  ? A kidney stone.  ? A nerve condition that affects your bladder control (neurogenic bladder).  ? Not getting enough to drink, or not urinating often.  · You have certain medical conditions, such as:  ? Diabetes.  ? A weak disease-fighting system (immunesystem).  ? Sickle cell disease.  ? Gout.  ? Spinal cord injury.  What are the signs or symptoms?  Symptoms of this condition include:  · Needing to urinate right away (urgently).  · Frequent urination or passing small amounts of urine frequently.  · Pain or burning with urination.  · Blood in the urine.  · Urine that smells bad or unusual.  · Trouble urinating.  · Cloudy  urine.  · Vaginal discharge, if you are female.  · Pain in the abdomen or the lower back.  You may also have:  · Vomiting or a decreased appetite.  · Confusion.  · Irritability or tiredness.  · A fever.  · Diarrhea.  The first symptom in older adults may be confusion. In some cases, they may not have any symptoms until the infection has worsened.  How is this diagnosed?  This condition is diagnosed based on your medical history and a physical exam. You may also have other tests, including:  · Urine tests.  · Blood tests.  · Tests for sexually transmitted infections (STIs).  If you have had more than one UTI, a cystoscopy or imaging studies may be done to determine the cause of the infections.  How is this treated?  Treatment for this condition includes:  · Antibiotic medicine.  · Over-the-counter medicines to treat discomfort.  · Drinking enough water to stay hydrated.  If you have frequent infections or have other conditions such as a kidney stone, you may need to see a health care provider who specializes in the urinary tract (urologist).  In rare cases, urinary tract infections can cause sepsis. Sepsis is a life-threatening condition that occurs when the body responds to an infection. Sepsis is treated in the hospital with IV antibiotics, fluids, and other medicines.  Follow these instructions at home:    Medicines  · Take over-the-counter and prescription medicines only as told by your health care provider.  · If you were prescribed an antibiotic medicine, take it as told by your health care provider. Do not stop using the antibiotic even if you start to feel better.  General instructions  · Make sure you:  ? Empty your bladder often and completely. Do not hold urine for long periods of time.  ? Empty your bladder after sex.  ? Wipe from front to back after a bowel movement if you are female. Use each tissue one time when you wipe.  · Drink enough fluid to keep your urine pale yellow.  · Keep all follow-up  visits as told by your health care provider. This is important.  Contact a health care provider if:  · Your symptoms do not get better after 1-2 days.  · Your symptoms go away and then return.  Get help right away if you have:  · Severe pain in your back or your lower abdomen.  · A fever.  · Nausea or vomiting.  Summary  · A urinary tract infection (UTI) is an infection of any part of the urinary tract, which includes the kidneys, ureters, bladder, and urethra.  · Most urinary tract infections are caused by bacteria in your genital area, around the entrance to your urinary tract (urethra).  · Treatment for this condition often includes antibiotic medicines.  · If you were prescribed an antibiotic medicine, take it as told by your health care provider. Do not stop using the antibiotic even if you start to feel better.  · Keep all follow-up visits as told by your health care provider. This is important.  This information is not intended to replace advice given to you by your health care provider. Make sure you discuss any questions you have with your health care provider.  Document Released: 09/27/2006 Document Revised: 12/05/2019 Document Reviewed: 06/27/2019  [employer] Patient Education © 2020 [employer]

## 2021-12-16 NOTE — PROGRESS NOTES
Subjective:     Concetta Hargrove is a 31 y.o. female who presents for UTI (Dysuria, hematuria, discharge, strong smell. Onset 1.5 weeks. )       UTI  This is a new problem. The problem has been gradually worsening. Associated symptoms include abdominal pain (Suprapubic discomfort), nausea and urinary symptoms (Frequency, urgency, dysuria). Pertinent negatives include no fever or vomiting.     Patient reports history of UTI.  Similar symptoms.  Gets about 1 UTI per year.    Unique test result dated 3/17/2020 reviewed: Urine culture positive for E. coli.    Unique test result dated 11/19/2019 reviewed: Urine culture negative.    Patient was screened prior to rooming and denied COVID-19 diagnosis or contact with a person who has been diagnosed or is suspected to have COVID-19. During this visit, appropriate PPE was worn, hand hygiene was performed, and the patient and any visitors were masked.     PMH:  has no past medical history of Asthma or Type II or unspecified type diabetes mellitus without mention of complication, not stated as uncontrolled.    MEDS:   Current Outpatient Medications:   •  phenazopyridine (PYRIDIUM) 200 MG Tab, Take 1 Tablet by mouth 3 times a day as needed for up to 2 days., Disp: 6 Tablet, Rfl: 0  •  sulfamethoxazole-trimethoprim (BACTRIM DS) 800-160 MG tablet, Take 1 Tablet by mouth 2 times a day for 3 days., Disp: 6 Tablet, Rfl: 0    ALLERGIES:   Allergies   Allergen Reactions   • Ceclor [Cefaclor] Unspecified     Stopped breathing  Tolerated ceftriaxone 4/2018    • Hydrocodone Vomiting     Tolerated oxycodone - 2018   • Penicillins      Skin rash.     SURGHX:   Past Surgical History:   Procedure Laterality Date   • REPEAT C SECTION N/A 3/28/2018    Procedure: REPEAT C SECTION;  Surgeon: Bernie Barriga M.D.;  Location: LABOR AND DELIVERY;  Service: Labor and Delivery   • PRIMARY C SECTION  7/6/2015    Procedure: PRIMARY C SECTION;  Surgeon: Bernie Barriga M.D.;  Location: LABOR AND  "DELIVERY;  Service:    • ABDOMINAL EXPLORATION     • APPENDECTOMY     • TONSILLECTOMY       SOCHX:  reports that she has never smoked. She has never used smokeless tobacco. She reports current alcohol use. She reports that she does not use drugs.     FH: Reviewed with patient, not pertinent to this visit.    Review of Systems   Constitutional: Negative.  Negative for fever and malaise/fatigue.   Gastrointestinal: Positive for abdominal pain (Suprapubic discomfort) and nausea. Negative for vomiting.   Genitourinary: Positive for dysuria, frequency, hematuria and urgency. Negative for flank pain.   Musculoskeletal: Negative for back pain.   All other systems reviewed and are negative.    Additional details per HPI.      Objective:     /80 (BP Location: Left arm, Patient Position: Sitting, BP Cuff Size: Adult)   Pulse (!) 108   Temp 37.3 °C (99.2 °F) (Temporal)   Resp 16   Ht 1.6 m (5' 3\")   Wt 60.8 kg (134 lb)   SpO2 100%   BMI 23.74 kg/m²     Physical Exam  Vitals reviewed.   Constitutional:       General: She is not in acute distress.     Appearance: She is well-developed. She is not ill-appearing or toxic-appearing.   Eyes:      General: Vision grossly intact.   Cardiovascular:      Rate and Rhythm: Tachycardia present.   Pulmonary:      Effort: Pulmonary effort is normal. No respiratory distress.   Abdominal:      Palpations: Abdomen is soft.      Tenderness: There is abdominal tenderness in the suprapubic area.   Musculoskeletal:         General: No deformity. Normal range of motion.      Cervical back: Normal range of motion.   Skin:     General: Skin is warm.      Coloration: Skin is not pale.   Neurological:      Mental Status: She is alert and oriented to person, place, and time.      Sensory: No sensory deficit.      Motor: No weakness.   Psychiatric:         Behavior: Behavior normal. Behavior is cooperative.     UA: cloudy, moderate LE, blood, small protein    POCT pregnancy: " negative      Assessment/Plan:     1. UTI symptoms  - POCT Urinalysis  - URINE CULTURE(NEW); Future  - POCT PREGNANCY  - phenazopyridine (PYRIDIUM) 200 MG Tab; Take 1 Tablet by mouth 3 times a day as needed for up to 2 days.  Dispense: 6 Tablet; Refill: 0    2. Acute cystitis with hematuria  - sulfamethoxazole-trimethoprim (BACTRIM DS) 800-160 MG tablet; Take 1 Tablet by mouth 2 times a day for 3 days.  Dispense: 6 Tablet; Refill: 0    Rx as above sent electronically. Increase fluids.    Differential diagnosis, natural history, supportive care, over-the-counter symptom management per 's instructions, close monitoring, and indications for immediate follow-up discussed.     All questions answered. Patient agrees with the plan of care.    [facility]

## 2021-12-18 ENCOUNTER — TELEPHONE (OUTPATIENT)
Dept: URGENT CARE | Facility: CLINIC | Age: 31
End: 2021-12-18

## 2021-12-18 NOTE — TELEPHONE ENCOUNTER
Spoke with patient over the phone regarding recent urine culture results.  Positive for E. coli resistant to Bactrim DS that patient was prescribed on 12/15/2021.  Patient reports that she went to the pharmacy who recommended that she start the Macrobid that her OB/GYN previously prescribed instead.  Urine culture report shows sensitivity to nitrofurantoin.  She has been taking this medication since 12/15/2021.  Reports symptoms unchanged at this time.  Has about 2 more days of therapy remaining.  Recommend finishing antibiotic.  Increasing fluids.  Monitor symptoms afterwards.  May have to start alternative antibiotic if no improvement.  Return precautions advised.

## 2022-01-17 ENCOUNTER — HOSPITAL ENCOUNTER (OUTPATIENT)
Dept: RADIOLOGY | Facility: MEDICAL CENTER | Age: 32
End: 2022-01-17
Attending: NURSE PRACTITIONER
Payer: COMMERCIAL

## 2022-01-17 ENCOUNTER — OFFICE VISIT (OUTPATIENT)
Dept: URGENT CARE | Facility: PHYSICIAN GROUP | Age: 32
End: 2022-01-17
Payer: COMMERCIAL

## 2022-01-17 ENCOUNTER — HOSPITAL ENCOUNTER (OUTPATIENT)
Dept: LAB | Facility: MEDICAL CENTER | Age: 32
End: 2022-01-17
Attending: NURSE PRACTITIONER
Payer: COMMERCIAL

## 2022-01-17 VITALS
HEART RATE: 96 BPM | SYSTOLIC BLOOD PRESSURE: 124 MMHG | BODY MASS INDEX: 24.29 KG/M2 | RESPIRATION RATE: 16 BRPM | WEIGHT: 132 LBS | TEMPERATURE: 98.9 F | OXYGEN SATURATION: 99 % | DIASTOLIC BLOOD PRESSURE: 74 MMHG | HEIGHT: 62 IN

## 2022-01-17 DIAGNOSIS — R07.89 OTHER CHEST PAIN: ICD-10-CM

## 2022-01-17 DIAGNOSIS — R07.1 CHEST PAIN MADE WORSE BY BREATHING: ICD-10-CM

## 2022-01-17 DIAGNOSIS — Z82.49 FAMILY HISTORY OF EARLY CAD: ICD-10-CM

## 2022-01-17 DIAGNOSIS — D64.9 ANEMIA, UNSPECIFIED TYPE: ICD-10-CM

## 2022-01-17 DIAGNOSIS — R74.8 ELEVATED ALKALINE PHOSPHATASE LEVEL: ICD-10-CM

## 2022-01-17 LAB
ALBUMIN SERPL BCP-MCNC: 4.8 G/DL (ref 3.2–4.9)
ALBUMIN/GLOB SERPL: 1.7 G/DL
ALP SERPL-CCNC: 112 U/L (ref 30–99)
ALT SERPL-CCNC: 28 U/L (ref 2–50)
ANION GAP SERPL CALC-SCNC: 13 MMOL/L (ref 7–16)
AST SERPL-CCNC: 27 U/L (ref 12–45)
BASOPHILS # BLD AUTO: 0.7 % (ref 0–1.8)
BASOPHILS # BLD: 0.06 K/UL (ref 0–0.12)
BILIRUB SERPL-MCNC: 0.3 MG/DL (ref 0.1–1.5)
BUN SERPL-MCNC: 7 MG/DL (ref 8–22)
CALCIUM SERPL-MCNC: 9.6 MG/DL (ref 8.5–10.5)
CHLORIDE SERPL-SCNC: 104 MMOL/L (ref 96–112)
CO2 SERPL-SCNC: 21 MMOL/L (ref 20–33)
CREAT SERPL-MCNC: 0.56 MG/DL (ref 0.5–1.4)
D DIMER PPP IA.FEU-MCNC: <0.27 UG/ML (FEU) (ref 0–0.5)
EOSINOPHIL # BLD AUTO: 0.23 K/UL (ref 0–0.51)
EOSINOPHIL NFR BLD: 2.6 % (ref 0–6.9)
ERYTHROCYTE [DISTWIDTH] IN BLOOD BY AUTOMATED COUNT: 45.3 FL (ref 35.9–50)
GLOBULIN SER CALC-MCNC: 2.9 G/DL (ref 1.9–3.5)
GLUCOSE SERPL-MCNC: 86 MG/DL (ref 65–99)
HCT VFR BLD AUTO: 33.7 % (ref 37–47)
HGB BLD-MCNC: 10.1 G/DL (ref 12–16)
IMM GRANULOCYTES # BLD AUTO: 0.03 K/UL (ref 0–0.11)
IMM GRANULOCYTES NFR BLD AUTO: 0.3 % (ref 0–0.9)
LYMPHOCYTES # BLD AUTO: 1.72 K/UL (ref 1–4.8)
LYMPHOCYTES NFR BLD: 19.7 % (ref 22–41)
MCH RBC QN AUTO: 22.1 PG (ref 27–33)
MCHC RBC AUTO-ENTMCNC: 30 G/DL (ref 33.6–35)
MCV RBC AUTO: 73.7 FL (ref 81.4–97.8)
MONOCYTES # BLD AUTO: 0.62 K/UL (ref 0–0.85)
MONOCYTES NFR BLD AUTO: 7.1 % (ref 0–13.4)
NEUTROPHILS # BLD AUTO: 6.09 K/UL (ref 2–7.15)
NEUTROPHILS NFR BLD: 69.6 % (ref 44–72)
NRBC # BLD AUTO: 0 K/UL
NRBC BLD-RTO: 0 /100 WBC
PLATELET # BLD AUTO: 331 K/UL (ref 164–446)
PMV BLD AUTO: 9.6 FL (ref 9–12.9)
POTASSIUM SERPL-SCNC: 3.9 MMOL/L (ref 3.6–5.5)
PROT SERPL-MCNC: 7.7 G/DL (ref 6–8.2)
RBC # BLD AUTO: 4.57 M/UL (ref 4.2–5.4)
SODIUM SERPL-SCNC: 138 MMOL/L (ref 135–145)
TROPONIN T SERPL-MCNC: <6 NG/L (ref 6–19)
WBC # BLD AUTO: 8.8 K/UL (ref 4.8–10.8)

## 2022-01-17 PROCEDURE — 93000 ELECTROCARDIOGRAM COMPLETE: CPT | Performed by: NURSE PRACTITIONER

## 2022-01-17 PROCEDURE — 36415 COLL VENOUS BLD VENIPUNCTURE: CPT

## 2022-01-17 PROCEDURE — 99214 OFFICE O/P EST MOD 30 MIN: CPT | Performed by: NURSE PRACTITIONER

## 2022-01-17 PROCEDURE — 85379 FIBRIN DEGRADATION QUANT: CPT

## 2022-01-17 PROCEDURE — 85025 COMPLETE CBC W/AUTO DIFF WBC: CPT

## 2022-01-17 PROCEDURE — 84484 ASSAY OF TROPONIN QUANT: CPT

## 2022-01-17 PROCEDURE — 80053 COMPREHEN METABOLIC PANEL: CPT

## 2022-01-17 ASSESSMENT — ENCOUNTER SYMPTOMS
GASTROINTESTINAL NEGATIVE: 1
SHORTNESS OF BREATH: 1
MUSCULOSKELETAL NEGATIVE: 1
CONSTITUTIONAL NEGATIVE: 1
ORTHOPNEA: 0
EYES NEGATIVE: 1
PSYCHIATRIC NEGATIVE: 1
NEUROLOGICAL NEGATIVE: 1
PALPITATIONS: 0

## 2022-01-17 ASSESSMENT — FIBROSIS 4 INDEX: FIB4 SCORE: 0.56

## 2022-01-17 NOTE — PROGRESS NOTES
Subjective:   Concetta Hargrove is a 31 y.o. female who presents for Shoulder Pain ((R) shoulder and chest pain after dinner last night)       HPI  Pt presents for evaluation of a new problem, reports acute onset of right epigastric and chest pain that began after eating  [employer] cheese last night.  Patient states that she has had increased pain involving these areas with deep inspiration as well as positional, with the worst being while she is laying down.  Patient states that during dinner she felt that she had early fullness, despite eating a normal amount..  Patient denies recent illness, fever, chills, nausea, vomiting, bowel or bladder changes.  Patient denies recent COVID, cough, shortness of breath.  Patient does have a history of ovarian cysts with pain that tends to radiate into her abdomen.  Patient states that she does have a family history of cardiac disease with aortic aneurysms, unsure if ascending or abdominal, states that it has been going worse with smoking and drinking history as well as being overweight..    Review of Systems   Constitutional: Negative.    HENT: Negative.    Eyes: Negative.    Respiratory: Positive for shortness of breath (w/ deep inspiration).    Cardiovascular: Positive for chest pain. Negative for palpitations and orthopnea.   Gastrointestinal: Negative.    Genitourinary: Negative.    Musculoskeletal: Negative.    Skin: Negative.    Neurological: Negative.    Psychiatric/Behavioral: Negative.    All other systems reviewed and are negative.      MEDS: No current outpatient medications on file.  ALLERGIES:   Allergies   Allergen Reactions   • Ceclor [Cefaclor] Unspecified     Stopped breathing  Tolerated ceftriaxone 4/2018    • Hydrocodone Vomiting     Tolerated oxycodone - 2018   • Penicillins      Skin rash.       Patient's PMH, SocHx, SurgHx, FamHx, Drug allergies and medications were reviewed.     Objective:   /74 (BP Location: Left arm, Patient  "Position: Sitting, BP Cuff Size: Adult)   Pulse 96   Temp 37.2 °C (98.9 °F) (Temporal)   Resp 16   Ht 1.575 m (5' 2\")   Wt 59.9 kg (132 lb)   SpO2 99%   BMI 24.14 kg/m²     Physical Exam  Vitals and nursing note reviewed.   Constitutional:       General: She is awake.      Appearance: Normal appearance. She is well-developed and normal weight.      Comments: It is noticed that when patient semi reclined for her abdomen exam, she reports increase of right upper chest pain.  She also reports increased pain with deep inspiration.   HENT:      Head: Normocephalic and atraumatic.      Right Ear: Tympanic membrane, ear canal and external ear normal.      Left Ear: Tympanic membrane, ear canal and external ear normal.      Nose: Nose normal.      Mouth/Throat:      Lips: Pink.      Mouth: Mucous membranes are moist.      Pharynx: Oropharynx is clear. Uvula midline.   Eyes:      Extraocular Movements: Extraocular movements intact.      Conjunctiva/sclera: Conjunctivae normal.      Pupils: Pupils are equal, round, and reactive to light.   Neck:      Thyroid: No thyromegaly.      Trachea: Trachea normal.   Cardiovascular:      Rate and Rhythm: Normal rate and regular rhythm.      Pulses: Normal pulses.      Heart sounds: Normal heart sounds, S1 normal and S2 normal.   Pulmonary:      Effort: Pulmonary effort is normal. No respiratory distress.      Breath sounds: Normal breath sounds and air entry. No wheezing, rhonchi or rales.   Abdominal:      General: Abdomen is flat. Bowel sounds are normal.      Palpations: Abdomen is soft.      Tenderness: There is no abdominal tenderness.   Musculoskeletal:         General: Normal range of motion.      Cervical back: Full passive range of motion without pain, normal range of motion and neck supple.      Comments: Chest pain is nonreproducible.   Lymphadenopathy:      Cervical: No cervical adenopathy.   Skin:     General: Skin is warm and dry.      Capillary Refill: Capillary " refill takes less than 2 seconds.   Neurological:      General: No focal deficit present.      Mental Status: She is alert and oriented to person, place, and time.      Gait: Gait is intact.   Psychiatric:         Attention and Perception: Attention and perception normal.         Mood and Affect: Mood normal.         Speech: Speech normal.         Behavior: Behavior normal. Behavior is cooperative.         Thought Content: Thought content normal.         Judgment: Judgment normal.         Assessment/Plan:   Assessment    1. Anemia, unspecified type    2. Other chest pain  - EKG - Clinic Performed  - CBC WITH DIFFERENTIAL; Future  - Comp Metabolic Panel; Future  - TROPONIN; Future  - D-DIMER; Future    3. Chest pain made worse by breathing  - CBC WITH DIFFERENTIAL; Future  - Comp Metabolic Panel; Future  - TROPONIN; Future  - D-DIMER; Future    4. Elevated alkaline phosphatase level    5. Family history of early CAD    Vital signs stable at today's acute urgent care visit. Reviewed test results that were completed in the clinic.  Order labs as listed, call patient post visit with results.  Patient states that she had remote history of being anemic while in high school, denies any recent history of known anemia, bleeding, or absorption issues.  Unable to obtain a CTA to rule out possible cardiopulmonary process, patient states that her symptoms have remained the same throughout today.  She has a follow-up appointment with her PCP in 4 days, prefers to wait until that point in time to review test results and attempt further testing at that time.  Discussed management options (risks, benefits, and alternatives to treatment).     Strict ed flags discussed and indications to immediately call 911 or present to the ED.  All questions were encouraged and answered to the patient's satisfaction and understanding, and they agree to the plan of care.     I personally reviewed prior external notes and test results pertinent to  today's visit.  I have independently reviewed and interpreted all diagnostics ordered during this urgent care acute visit. Time spent evaluating this patient was a minimum of 30 minutes and includes preparing for visit, counseling/education, exam, evaluation, obtaining history, and ordering lab/test/procedures.      Please note that this dictation was created using voice recognition software. I have made a reasonable attempt to correct obvious errors, but I expect that there are errors of grammar and possibly content that I did not discover before finalizing the note.

## 2022-01-24 ENCOUNTER — HOSPITAL ENCOUNTER (OUTPATIENT)
Facility: MEDICAL CENTER | Age: 32
End: 2022-01-24
Attending: OBSTETRICS & GYNECOLOGY
Payer: COMMERCIAL

## 2022-01-24 LAB — PATHOLOGY CONSULT NOTE: NORMAL

## 2022-01-24 PROCEDURE — 88305 TISSUE EXAM BY PATHOLOGIST: CPT | Mod: 59

## 2022-02-19 ENCOUNTER — HOSPITAL ENCOUNTER (OUTPATIENT)
Facility: MEDICAL CENTER | Age: 32
End: 2022-02-19
Attending: PHYSICIAN ASSISTANT
Payer: COMMERCIAL

## 2022-02-19 ENCOUNTER — OFFICE VISIT (OUTPATIENT)
Dept: URGENT CARE | Facility: PHYSICIAN GROUP | Age: 32
End: 2022-02-19
Payer: COMMERCIAL

## 2022-02-19 VITALS
BODY MASS INDEX: 24.48 KG/M2 | SYSTOLIC BLOOD PRESSURE: 120 MMHG | RESPIRATION RATE: 16 BRPM | HEIGHT: 62 IN | TEMPERATURE: 99.8 F | DIASTOLIC BLOOD PRESSURE: 76 MMHG | HEART RATE: 79 BPM | OXYGEN SATURATION: 100 % | WEIGHT: 133 LBS

## 2022-02-19 DIAGNOSIS — N30.00 ACUTE CYSTITIS WITHOUT HEMATURIA: ICD-10-CM

## 2022-02-19 LAB
APPEARANCE UR: NORMAL
BILIRUB UR STRIP-MCNC: NEGATIVE MG/DL
COLOR UR AUTO: YELLOW
GLUCOSE UR STRIP.AUTO-MCNC: NEGATIVE MG/DL
INT CON NEG: NORMAL
INT CON POS: NORMAL
KETONES UR STRIP.AUTO-MCNC: NEGATIVE MG/DL
LEUKOCYTE ESTERASE UR QL STRIP.AUTO: NORMAL
NITRITE UR QL STRIP.AUTO: NEGATIVE
PH UR STRIP.AUTO: 5.5 [PH] (ref 5–8)
POC URINE PREGNANCY TEST: NEGATIVE
PROT UR QL STRIP: NEGATIVE MG/DL
RBC UR QL AUTO: NEGATIVE
SP GR UR STRIP.AUTO: 1
UROBILINOGEN UR STRIP-MCNC: 0.2 MG/DL

## 2022-02-19 PROCEDURE — 87086 URINE CULTURE/COLONY COUNT: CPT

## 2022-02-19 PROCEDURE — 99213 OFFICE O/P EST LOW 20 MIN: CPT | Performed by: PHYSICIAN ASSISTANT

## 2022-02-19 PROCEDURE — 81025 URINE PREGNANCY TEST: CPT | Performed by: PHYSICIAN ASSISTANT

## 2022-02-19 PROCEDURE — 81002 URINALYSIS NONAUTO W/O SCOPE: CPT | Performed by: PHYSICIAN ASSISTANT

## 2022-02-19 RX ORDER — NITROFURANTOIN 25; 75 MG/1; MG/1
100 CAPSULE ORAL 2 TIMES DAILY
Qty: 10 CAPSULE | Refills: 0 | Status: SHIPPED | OUTPATIENT
Start: 2022-02-19 | End: 2022-02-24

## 2022-02-19 ASSESSMENT — ENCOUNTER SYMPTOMS
DIARRHEA: 0
FLANK PAIN: 0
COUGH: 0
HEADACHES: 0
VOMITING: 0
FEVER: 0
ABDOMINAL PAIN: 0
BACK PAIN: 0
SHORTNESS OF BREATH: 0
NAUSEA: 0
CHILLS: 0

## 2022-02-19 ASSESSMENT — FIBROSIS 4 INDEX: FIB4 SCORE: 0.51

## 2022-02-19 NOTE — PROGRESS NOTES
Subjective     Concetta Hargrove is a 31 y.o. female who presents with UTI (Dysuria, lower back pain, bloating, urgency, frequency, discharge. Onset 4 days. )            Dysuria   This is a new problem. Episode onset: 4 days. The problem occurs every urination. The problem has been waxing and waning. The quality of the pain is described as burning. The pain is moderate. There has been no fever. She is sexually active. There is no history of pyelonephritis. Associated symptoms include frequency and urgency. Pertinent negatives include no chills, discharge, flank pain, hematuria, nausea, possible pregnancy or vomiting. She has tried nothing for the symptoms. Her past medical history is significant for recurrent UTIs (2 per year).       History reviewed. No pertinent past medical history.    Past Surgical History:   Procedure Laterality Date   • REPEAT C SECTION N/A 3/28/2018    Procedure: REPEAT C SECTION;  Surgeon: Bernie Barriga M.D.;  Location: LABOR AND DELIVERY;  Service: Labor and Delivery   • PRIMARY C SECTION  7/6/2015    Procedure: PRIMARY C SECTION;  Surgeon: Bernie Barriga M.D.;  Location: LABOR AND DELIVERY;  Service:    • ABDOMINAL EXPLORATION     • APPENDECTOMY     • TONSILLECTOMY         Family History   Problem Relation Age of Onset   • Hypertension Mother    • Breast Cancer Mother 33   • Cancer Mother 33        Uterine. Full hysterectomy 35.   • Hypertension Maternal Grandmother    • Breast Cancer Maternal Grandmother    • Cancer Maternal Grandmother         Uterine CA. Full hysterectomy.    • Other Sister         Ovarian Cyst   • No Known Problems Daughter    • No Known Problems Daughter        Allergies   Allergen Reactions   • Ceclor [Cefaclor] Unspecified     Stopped breathing  Tolerated ceftriaxone 4/2018    • Hydrocodone Vomiting     Tolerated oxycodone - 2018   • Penicillins      Skin rash.       Medications, Allergies, and current problem list reviewed today in Epic      Review of Systems  "  Constitutional: Negative for chills, fever and malaise/fatigue.   Respiratory: Negative for cough and shortness of breath.    Cardiovascular: Negative for chest pain and leg swelling.   Gastrointestinal: Negative for abdominal pain, diarrhea, nausea and vomiting.   Genitourinary: Positive for dysuria, frequency and urgency. Negative for flank pain and hematuria.   Musculoskeletal: Negative for back pain.   Skin: Negative for rash.   Neurological: Negative for headaches.     All other systems reviewed and are negative.            Objective     /76 (BP Location: Left arm, Patient Position: Sitting, BP Cuff Size: Adult)   Pulse 79   Temp 37.7 °C (99.8 °F) (Temporal)   Resp 16   Ht 1.575 m (5' 2\")   Wt 60.3 kg (133 lb)   SpO2 100%   BMI 24.33 kg/m²      Physical Exam  Constitutional:       General: She is not in acute distress.     Appearance: She is not ill-appearing.   HENT:      Head: Normocephalic and atraumatic.   Eyes:      Conjunctiva/sclera: Conjunctivae normal.   Cardiovascular:      Rate and Rhythm: Normal rate and regular rhythm.   Pulmonary:      Effort: Pulmonary effort is normal. No respiratory distress.      Breath sounds: Normal breath sounds. No wheezing.   Abdominal:      Tenderness: There is no right CVA tenderness or left CVA tenderness.   Skin:     General: Skin is warm and dry.   Neurological:      General: No focal deficit present.      Mental Status: She is alert and oriented to person, place, and time.   Psychiatric:         Mood and Affect: Mood normal.         Behavior: Behavior normal.         Thought Content: Thought content normal.         Judgment: Judgment normal.             Lab Results   Component Value Date/Time    POCCOLOR YELLOW 02/19/2022 11:25 AM    POCAPPEAR TURBID 02/19/2022 11:25 AM    POCLEUKEST SMALL 02/19/2022 11:25 AM    POCNITRITE NEGATIVE 02/19/2022 11:25 AM    POCUROBILIGE 0.2 02/19/2022 11:25 AM    POCPROTEIN NEGATIVE 02/19/2022 11:25 AM    POCURPH 5.5 " 02/19/2022 11:25 AM    POCBLOOD NEGATIVE 02/19/2022 11:25 AM    POCSPGRV 1.005 02/19/2022 11:25 AM    POCKETONES NEGATIVE 02/19/2022 11:25 AM    POCBILIRUBIN NEGATIVE 02/19/2022 11:25 AM    POCGLUCUA NEGATIVE 02/19/2022 11:25 AM            HCG- negative              Assessment & Plan        1. Acute cystitis without hematuria    - POCT Urinalysis  - POCT Pregnancy  - URINE CULTURE(NEW); Future  - nitrofurantoin (MACROBID) 100 MG Cap; Take 1 Capsule by mouth 2 times a day for 5 days.  Dispense: 10 Capsule; Refill: 0          Differential diagnoses, Supportive care, and indications for immediate follow-up discussed with patient.   Pathogenesis of diagnosis discussed including typical length and natural progression.   Instructed to return to clinic or nearest emergency department for any change in condition, further concerns, or worsening of symptoms.    The patient demonstrated a good understanding and agreed with the treatment plan.    Cassandra Wang P.A.-C.

## 2022-02-22 LAB
BACTERIA UR CULT: NORMAL
SIGNIFICANT IND 70042: NORMAL
SITE SITE: NORMAL
SOURCE SOURCE: NORMAL

## 2022-04-15 ENCOUNTER — HOSPITAL ENCOUNTER (OUTPATIENT)
Dept: LAB | Facility: MEDICAL CENTER | Age: 32
End: 2022-04-15
Attending: OBSTETRICS & GYNECOLOGY
Payer: COMMERCIAL

## 2022-04-15 PROCEDURE — 88307 TISSUE EXAM BY PATHOLOGIST: CPT | Mod: 59

## 2022-04-15 PROCEDURE — 88305 TISSUE EXAM BY PATHOLOGIST: CPT

## 2022-04-16 LAB — PATHOLOGY CONSULT NOTE: NORMAL

## 2022-05-18 ENCOUNTER — OFFICE VISIT (OUTPATIENT)
Dept: URGENT CARE | Facility: PHYSICIAN GROUP | Age: 32
End: 2022-05-18
Payer: COMMERCIAL

## 2022-05-18 VITALS
DIASTOLIC BLOOD PRESSURE: 64 MMHG | HEART RATE: 100 BPM | HEIGHT: 62 IN | WEIGHT: 135 LBS | OXYGEN SATURATION: 94 % | RESPIRATION RATE: 16 BRPM | SYSTOLIC BLOOD PRESSURE: 110 MMHG | TEMPERATURE: 99.4 F | BODY MASS INDEX: 24.84 KG/M2

## 2022-05-18 DIAGNOSIS — J02.9 SORE THROAT: ICD-10-CM

## 2022-05-18 LAB
INT CON NEG: NEGATIVE
INT CON POS: POSITIVE
S PYO AG THROAT QL: NEGATIVE

## 2022-05-18 PROCEDURE — 99213 OFFICE O/P EST LOW 20 MIN: CPT | Performed by: FAMILY MEDICINE

## 2022-05-18 PROCEDURE — 87880 STREP A ASSAY W/OPTIC: CPT | Performed by: FAMILY MEDICINE

## 2022-05-18 ASSESSMENT — FIBROSIS 4 INDEX: FIB4 SCORE: 0.51

## 2022-05-18 NOTE — PROGRESS NOTES
"  Subjective:      31 y.o. female presents to urgent care for cold symptoms that started yesterday.  She is experiencing sore throat, ear ache, and body aches. No cough or headache. She hasn't tried anything for her symptoms. She denies any tobacco product use.  She had childhood asthma but no longer needs medication for it. She is fully vaccinated against COVID and had it about one month ago.  She has had no known sick contacts.    She denies any other questions or concerns at this time.    Current problem list, medication, and past medical/surgical history were reviewed in Epic.    ROS  See HPI     Objective:      /64 (BP Location: Left arm, Patient Position: Sitting, BP Cuff Size: Adult)   Pulse 100   Temp 37.4 °C (99.4 °F) (Temporal)   Resp 16   Ht 1.575 m (5' 2\")   Wt 61.2 kg (135 lb)   SpO2 94%   BMI 24.69 kg/m²     Physical Exam  Constitutional:       General: She is not in acute distress.     Appearance: She is not diaphoretic.   HENT:      Right Ear: Tympanic membrane, ear canal and external ear normal.      Left Ear: Tympanic membrane, ear canal and external ear normal.      Mouth/Throat:      Pharynx: Uvula midline. No posterior oropharyngeal erythema.      Tonsils: No tonsillar exudate. 0 on the right. 0 on the left.   Cardiovascular:      Rate and Rhythm: Normal rate and regular rhythm.      Heart sounds: Normal heart sounds.   Pulmonary:      Effort: Pulmonary effort is normal. No respiratory distress.      Breath sounds: Normal breath sounds.   Neurological:      Mental Status: She is alert.   Psychiatric:         Mood and Affect: Affect normal.         Judgment: Judgment normal.       Assessment/Plan:     1. Sore throat  Rapid strep negative.  Likely viral etiology.  Tylenol, ibuprofen, gargling warm salt water as needed.  - POCT Rapid Strep A        Instructed to return to Urgent Care or nearest Emergency Department if symptoms fail to improve, for any change in condition, further " concerns, or new concerning symptoms. Patient states understanding of the plan of care and discharge instructions.    Nori Khan M.D.

## 2022-08-01 ENCOUNTER — HOSPITAL ENCOUNTER (OUTPATIENT)
Dept: LAB | Facility: MEDICAL CENTER | Age: 32
End: 2022-08-01
Attending: OBSTETRICS & GYNECOLOGY
Payer: COMMERCIAL

## 2022-08-01 PROCEDURE — 88175 CYTOPATH C/V AUTO FLUID REDO: CPT

## 2022-08-01 PROCEDURE — 87624 HPV HI-RISK TYP POOLED RSLT: CPT

## 2022-08-02 LAB
CYTOLOGY REG CYTOL: NORMAL
HPV HR 12 DNA CVX QL NAA+PROBE: NEGATIVE
HPV16 DNA SPEC QL NAA+PROBE: NEGATIVE
HPV18 DNA SPEC QL NAA+PROBE: NEGATIVE
SPECIMEN SOURCE: NORMAL

## 2022-10-31 ENCOUNTER — HOSPITAL ENCOUNTER (OUTPATIENT)
Facility: MEDICAL CENTER | Age: 32
End: 2022-10-31
Attending: OBSTETRICS & GYNECOLOGY
Payer: COMMERCIAL

## 2022-10-31 ENCOUNTER — HOSPITAL ENCOUNTER (OUTPATIENT)
Dept: LAB | Facility: MEDICAL CENTER | Age: 32
End: 2022-10-31
Attending: OBSTETRICS & GYNECOLOGY

## 2022-10-31 PROCEDURE — 88175 CYTOPATH C/V AUTO FLUID REDO: CPT

## 2022-10-31 PROCEDURE — 87624 HPV HI-RISK TYP POOLED RSLT: CPT

## 2023-06-20 NOTE — PROGRESS NOTES
Subjective:      Concetta Hargrove is a 29 y.o. female who presents with Rash (pt seen on 01/29/2020)    PMH:  has no past medical history of Asthma or Type II or unspecified type diabetes mellitus without mention of complication, not stated as uncontrolled.  MEDS:   Current Outpatient Medications:   •  ketoconazole (NIZORAL) 2 % Cream, Apply twice daily to affected area up to one week after rash has healed.  Max 4 week use., Disp: 1 Tube, Rfl: 0  ALLERGIES:   Allergies   Allergen Reactions   • Ceclor [Cefaclor] Unspecified     Stopped breathing  Tolerated ceftriaxone 4/2018    • Hydrocodone Vomiting     Tolerated oxycodone - 2018     SURGHX:   Past Surgical History:   Procedure Laterality Date   • REPEAT C SECTION N/A 3/28/2018    Procedure: REPEAT C SECTION;  Surgeon: Bernie Barriga M.D.;  Location: [facility];  Service: Labor and Delivery   • PRIMARY C SECTION  7/6/2015    Procedure: PRIMARY C SECTION;  Surgeon: Bernie Barriga M.D.;  Location: LAB[facility] DELIVERY;  Service:    • ABDOMINAL EXPLORATION     • APPENDECTOMY     • TONSILLECTOMY       SOCHX:  reports that she has never smoked. She has never used smokeless tobacco. She reports that she does not drink alcohol or use drugs.  FH: Reviewed with patient, not pertinent to this visit.           Patient presents with: Significantly worsening rash to bilateral groin and thighs, with a new rash to trunk and neck.  Patient states she has not had a fever today but does feel hot, achy and not well.  Patient did take the Diflucan pill as prescribed for intertrigo yeast infection and did use the topical antifungal medication as directed though patient states there was only enough in the tube for 1 day of application.  Patient is concerned because the rashes gotten significantly worse in such a short amount of time.  No other complaints today.      Rash   This is a new problem. The current episode started in the past 7 days. The problem has been rapidly  "worsening since onset. The affected locations include the abdomen, groin, torso, chest, back and neck. The rash is characterized by burning, pain, redness, swelling and scaling. It is unknown if there was an exposure to a precipitant. Pertinent negatives include no congestion, cough or fever. Treatments tried: Oral and topical antifungal medication. The treatment provided no relief. There is no history of eczema or varicella.       Review of Systems   Constitutional: Negative for fever.   HENT: Negative for congestion.    Respiratory: Negative for cough.    Skin: Positive for rash.   All other systems reviewed and are negative.         Objective:     /88   Pulse 98   Temp 37.2 °C (98.9 °F)   Resp 14   Ht 1.575 m (5' 2\")   Wt 62.9 kg (138 lb 9.6 oz)   LMP 01/25/2020 (Exact Date)   SpO2 99%   BMI 25.35 kg/m²      Physical Exam  Vitals signs and nursing note reviewed.   Constitutional:       General: She is not in acute distress.     Appearance: Normal appearance. She is well-developed and normal weight. She is not toxic-appearing or diaphoretic.   HENT:      Head: Normocephalic and atraumatic.      Right Ear: Tympanic membrane normal.      Left Ear: Tympanic membrane normal.      Nose: Mucosal edema present. No congestion or rhinorrhea.      Mouth/Throat:      Mouth: Mucous membranes are moist.      Pharynx: Uvula midline. No posterior oropharyngeal erythema.      Tonsils: No tonsillar exudate.   Eyes:      General: Lids are normal.      Extraocular Movements: Extraocular movements intact.      Conjunctiva/sclera:      Right eye: Right conjunctiva is injected.      Left eye: Left conjunctiva is injected.      Pupils: Pupils are equal, round, and reactive to light.   Neck:      Musculoskeletal: Normal range of motion and neck supple.   Cardiovascular:      Rate and Rhythm: Regular rhythm. Tachycardia present.      Pulses: Normal pulses.      Heart sounds: Normal heart sounds.   Pulmonary:      Effort: " Pulmonary effort is normal. No respiratory distress.      Breath sounds: No wheezing or rales.   Abdominal:      Palpations: Abdomen is soft.   Musculoskeletal: Normal range of motion.   Skin:     General: Skin is warm and dry.      Capillary Refill: Capillary refill takes less than 2 seconds.          Neurological:      General: No focal deficit present.      Mental Status: She is alert and oriented to person, place, and time.      Gait: Gait normal.   Psychiatric:         Mood and Affect: Mood normal.         Behavior: Behavior is cooperative.            Strep: neg     Assessment/Plan:     1. Cellulitis of groin  POCT Rapid Strep A   2. Candidal intertrigo  POCT Rapid Strep A   3. Morbilliform rash  POCT Rapid Strep A     I am concerned about a staph/strep cellulitis to skin of groin bilaterally, concerned as well about scarlatiniform rash to trunk and neck that is new since her previous visit 2 days ago.  In office rapid strep test was negative which I thought may have been the etiology of the rash to her trunk.  Rash is significantly worsened in 48 hours and any rashes appeared on her trunk.  For this reason I feel that the PT requires evaluation and treatment at a facility that can provide a higher level of care due to acuity of illness/complaint.    PT will drive herself to ER for further evaluation.     [As Noted in HPI] : as noted in HPI [Constipation] : constipation [Negative] : Heme/Lymph [FreeTextEntry7] : prolapsing and bleeding hemorrhoids.

## 2023-12-26 NOTE — TELEPHONE ENCOUNTER
BP acceptable   Continue amlodipine with hold parameters for SBP < 130.   Hold evening doses of metoprolol and clonidine given HR in the 40-50s.  Resume in AM if bradycardia has improved.      Pt returned call.Informed her of results,voiced understanding.

## (undated) DEVICE — SWABSTICK BENZOIN SINGLE (50EA/BX)

## (undated) DEVICE — SUTURE 3-0 VICRYL PLUS CT-1 - 36 INCH (36/BX)

## (undated) DEVICE — SUTURE 0 VICRYL PLUS CT-1 - 36 INCH (36/BX)

## (undated) DEVICE — GLOVE BIOGEL SZ 8 SURGICAL PF LTX - (50PR/BX 4BX/CA)

## (undated) DEVICE — PACK C-SECTION (2EA/CA)

## (undated) DEVICE — SODIUM CHL IRRIGATION 0.9% 1000ML (12EA/CA)

## (undated) DEVICE — TUBING CLEARLINK DUO-VENT - C-FLO (48EA/CA)

## (undated) DEVICE — GLOVE BIOGEL SZ 6 PF LATEX - (50EA/BX 4BX/CA)

## (undated) DEVICE — TRAY SPINAL ANESTHESIA NON-SAFETY (10/CA)

## (undated) DEVICE — HEAD HOLDER JUNIOR/ADULT

## (undated) DEVICE — GLOVE BIOGEL SZ 7.5 SURGICAL PF LTX - (50PR/BX 4BX/CA)

## (undated) DEVICE — KIT  I.V. START (100EA/CA)

## (undated) DEVICE — DETERGENT RENUZYME PLUS 10 OZ PACKET (50/BX)

## (undated) DEVICE — SET EXTENSION WITH 2 PORTS (48EA/CA) ***PART #2C8610 IS A SUBSTITUTE*****

## (undated) DEVICE — GLOVE BIOGEL SZ 6.5 SURGICAL PF LTX (50PR/BX 4BX/CA)

## (undated) DEVICE — ELECTRODE DUAL RETURN W/ CORD - (50/PK)

## (undated) DEVICE — GLOVE BIOGEL SURGICAL PF LATEX M SIZE 8.5 (50PR/BX 4BX/CA)

## (undated) DEVICE — 0 CHROMIC CT-1

## (undated) DEVICE — KIT SKIN PREP SURG. SOLUTION - DURAPREP (20 KT/CA)

## (undated) DEVICE — SUTURE 4-0 VICRYL PLUSFS-1 - 27 INCH (36/BX)

## (undated) DEVICE — CLOSURE SKIN STRIP 1/2 X 4 IN - (STERI STRIP) (50/BX 4BX/CA)

## (undated) DEVICE — CATHETER IV NON-SAFETY 18 GA X 1 1/4 (50/BX 4BX/CA)

## (undated) DEVICE — WATER IRRIG. STER. 1500 ML - (9/CA)

## (undated) DEVICE — SUTURE 2-0 VICRYL PLUS CT-1 36 (36PK/BX)"